# Patient Record
Sex: MALE | Race: WHITE | NOT HISPANIC OR LATINO | Employment: FULL TIME | URBAN - METROPOLITAN AREA
[De-identification: names, ages, dates, MRNs, and addresses within clinical notes are randomized per-mention and may not be internally consistent; named-entity substitution may affect disease eponyms.]

---

## 2017-03-29 ENCOUNTER — OFFICE VISIT (OUTPATIENT)
Dept: FAMILY MEDICINE | Facility: CLINIC | Age: 47
End: 2017-03-29
Payer: COMMERCIAL

## 2017-03-29 ENCOUNTER — RADIANT APPOINTMENT (OUTPATIENT)
Dept: ULTRASOUND IMAGING | Facility: CLINIC | Age: 47
End: 2017-03-29
Attending: PHYSICIAN ASSISTANT
Payer: COMMERCIAL

## 2017-03-29 ENCOUNTER — ANTICOAGULATION THERAPY VISIT (OUTPATIENT)
Dept: NURSING | Facility: CLINIC | Age: 47
End: 2017-03-29
Payer: COMMERCIAL

## 2017-03-29 VITALS
OXYGEN SATURATION: 98 % | HEIGHT: 73 IN | WEIGHT: 248 LBS | DIASTOLIC BLOOD PRESSURE: 83 MMHG | SYSTOLIC BLOOD PRESSURE: 131 MMHG | HEART RATE: 99 BPM | BODY MASS INDEX: 32.87 KG/M2

## 2017-03-29 DIAGNOSIS — Z79.01 LONG-TERM (CURRENT) USE OF ANTICOAGULANTS: ICD-10-CM

## 2017-03-29 DIAGNOSIS — M79.662 PAIN OF LEFT CALF: ICD-10-CM

## 2017-03-29 DIAGNOSIS — Z00.00 ROUTINE GENERAL MEDICAL EXAMINATION AT A HEALTH CARE FACILITY: Primary | ICD-10-CM

## 2017-03-29 DIAGNOSIS — K51.919 ULCERATIVE COLITIS WITH COMPLICATION, UNSPECIFIED LOCATION (H): ICD-10-CM

## 2017-03-29 DIAGNOSIS — I82.409 DEEP VEIN THROMBOSIS (DVT) (H): ICD-10-CM

## 2017-03-29 DIAGNOSIS — I82.402 DEEP VEIN THROMBOSIS (DVT) OF LEFT LOWER EXTREMITY, UNSPECIFIED CHRONICITY, UNSPECIFIED VEIN (H): ICD-10-CM

## 2017-03-29 LAB
ANION GAP SERPL CALCULATED.3IONS-SCNC: 9 MMOL/L (ref 3–14)
BASOPHILS # BLD AUTO: 0 10E9/L (ref 0–0.2)
BASOPHILS NFR BLD AUTO: 0.3 %
BUN SERPL-MCNC: 15 MG/DL (ref 7–30)
CALCIUM SERPL-MCNC: 9.4 MG/DL (ref 8.5–10.1)
CHLORIDE SERPL-SCNC: 103 MMOL/L (ref 94–109)
CHOLEST SERPL-MCNC: 167 MG/DL
CO2 SERPL-SCNC: 27 MMOL/L (ref 20–32)
CREAT SERPL-MCNC: 1.31 MG/DL (ref 0.66–1.25)
DIFFERENTIAL METHOD BLD: ABNORMAL
EOSINOPHIL # BLD AUTO: 0.3 10E9/L (ref 0–0.7)
EOSINOPHIL NFR BLD AUTO: 3.7 %
ERYTHROCYTE [DISTWIDTH] IN BLOOD BY AUTOMATED COUNT: 14.3 % (ref 10–15)
GFR SERPL CREATININE-BSD FRML MDRD: 59 ML/MIN/1.7M2
GLUCOSE SERPL-MCNC: 92 MG/DL (ref 70–99)
HCT VFR BLD AUTO: 45.5 % (ref 40–53)
HDLC SERPL-MCNC: 51 MG/DL
HGB BLD-MCNC: 15.3 G/DL (ref 13.3–17.7)
INR POINT OF CARE: 1.1 (ref 0.86–1.14)
LDLC SERPL CALC-MCNC: 100 MG/DL
LYMPHOCYTES # BLD AUTO: 1.9 10E9/L (ref 0.8–5.3)
LYMPHOCYTES NFR BLD AUTO: 24.8 %
MCH RBC QN AUTO: 30.7 PG (ref 26.5–33)
MCHC RBC AUTO-ENTMCNC: 33.6 G/DL (ref 31.5–36.5)
MCV RBC AUTO: 91 FL (ref 78–100)
MONOCYTES # BLD AUTO: 1.4 10E9/L (ref 0–1.3)
MONOCYTES NFR BLD AUTO: 18.2 %
NEUTROPHILS # BLD AUTO: 4 10E9/L (ref 1.6–8.3)
NEUTROPHILS NFR BLD AUTO: 53 %
NONHDLC SERPL-MCNC: 116 MG/DL
PLATELET # BLD AUTO: 314 10E9/L (ref 150–450)
POTASSIUM SERPL-SCNC: 4 MMOL/L (ref 3.4–5.3)
RADIOLOGIST FLAGS: ABNORMAL
RBC # BLD AUTO: 4.98 10E12/L (ref 4.4–5.9)
SODIUM SERPL-SCNC: 139 MMOL/L (ref 133–144)
TRIGL SERPL-MCNC: 82 MG/DL
WBC # BLD AUTO: 7.5 10E9/L (ref 4–11)

## 2017-03-29 PROCEDURE — 36416 COLLJ CAPILLARY BLOOD SPEC: CPT

## 2017-03-29 PROCEDURE — 80048 BASIC METABOLIC PNL TOTAL CA: CPT | Performed by: PHYSICIAN ASSISTANT

## 2017-03-29 PROCEDURE — 85025 COMPLETE CBC W/AUTO DIFF WBC: CPT | Performed by: PHYSICIAN ASSISTANT

## 2017-03-29 PROCEDURE — 99396 PREV VISIT EST AGE 40-64: CPT | Performed by: PHYSICIAN ASSISTANT

## 2017-03-29 PROCEDURE — 99207 ZZC NO CHARGE NURSE ONLY: CPT

## 2017-03-29 PROCEDURE — 80061 LIPID PANEL: CPT | Performed by: PHYSICIAN ASSISTANT

## 2017-03-29 PROCEDURE — 85610 PROTHROMBIN TIME: CPT | Mod: QW

## 2017-03-29 PROCEDURE — 99213 OFFICE O/P EST LOW 20 MIN: CPT | Mod: 25 | Performed by: PHYSICIAN ASSISTANT

## 2017-03-29 PROCEDURE — 93971 EXTREMITY STUDY: CPT | Mod: LT

## 2017-03-29 RX ORDER — PREDNISONE 20 MG/1
20 TABLET ORAL DAILY
Qty: 20 TABLET | Refills: 0 | Status: SHIPPED | OUTPATIENT
Start: 2017-03-29 | End: 2017-05-03

## 2017-03-29 RX ORDER — MESALAMINE 4 G/60ML
4 SUSPENSION RECTAL AT BEDTIME
Qty: 7 ENEMA | Refills: 3 | Status: SHIPPED | OUTPATIENT
Start: 2017-03-29 | End: 2017-05-03

## 2017-03-29 RX ORDER — WARFARIN SODIUM 5 MG/1
5 TABLET ORAL DAILY
COMMUNITY
End: 2017-03-29

## 2017-03-29 RX ORDER — WARFARIN SODIUM 5 MG/1
TABLET ORAL
Qty: 45 TABLET | Refills: 1 | Status: SHIPPED | OUTPATIENT
Start: 2017-03-29 | End: 2017-04-26

## 2017-03-29 NOTE — MR AVS SNAPSHOT
Donato De Leon   3/29/2017 2:00 PM   Anticoagulation Therapy Visit    Description:  47 year old male   Provider:  AN ANTI COAG   Department:  An Nurse           INR as of 3/29/2017     Today's INR 1.1!      Anticoagulation Summary as of 3/29/2017     INR goal 2.0-3.0   Today's INR 1.1!   Full instructions 3/29: 7.5 mg; 3/30: 7.5 mg   Next INR check 3/31/2017    Indications   Deep vein thrombosis (DVT) (H) [I82.409] [I82.409]  Long-term (current) use of anticoagulants [Z79.01] [Z79.01]         Your next Anticoagulation Clinic appointment(s)     Mar 31, 2017  8:30 AM CDT   [facility]   [facility]    [address] [facility] [address] [phone]            Apr 03, 2017  8:15 AM CDT   Anticoagulation Visit with AN ANTI COAG   [facility]    [address] [facility] [address] [phone]              Contact Numbers     [facility]  Please call  [phone] to cancel and/or reschedule your appointment, or with any problems or questions regarding your therapy.        March 2017 Details    Sun Mon Tue Wed Thu Fri Sat        1               2               3               4                 5               6               7               8               9               10               11                 12               13               14               15               16               17               18                 19               20               21               22               23               24               25                 26               27               28               29      7.5 mg   See details      30      7.5 mg         31              Date Details   03/29 This INR check       Date of next INR:  3/31/2017         How to take your warfarin dose     To take:  7.5 mg Take 1.5 of the 5 mg tablets.

## 2017-03-29 NOTE — PROGRESS NOTES
ANTICOAGULATION INITIAL CLINIC VISIT    Patient Name:  Dnoato De Leon  Date:  3/29/2017  Referred by: Carter GOAYL  Contact Type:  Face to Face    SUBJECTIVE:  Coumadin education was completed today.  Topics covered include:  -Introduction to coumadin  -Proper Administration  -INR Testing  -Sign/Symptoms of Bleeding  -Signs/Symptoms of Clot Formation or Stroke  -Dietary Intake of Vitamin K  -Drug Interactions  -Anticoagulation Identification (bracelet, necklace or wallet card)  -Future Surgery  -Effects of Alcohol, Tobacco, and Exercise on Coumadin    Coumadin Education Booklet given to the patient.  Written instructions on self injecting sub q lovenox also given to patient.        Patient Findings     Positives Initiation of therapy    Comments New Dx DVT lower leg today. Starting on Lovenox and warfarin. Start dose 7.5 mg daily and lovenox 100 mg Q 12 hours. Reviewed all warfarin teaching as per protocol and instructions on self administering lovenox sub q. Patient verbalized understanding and seems to have a good understanding. Recheck INR in 2 days. Patient also has ulcerative colitis flare. Was going to start prednisone for this but due to interaction with warfarin will be using an alternative enema treatment. Patient given phone # to call if any concerns or questions.           OBJECTIVE    INR Protime   Date Value Ref Range Status   03/29/2017 1.1 0.86 - 1.14 Final       ASSESSMENT / PLAN  INR assessment SUB    Recheck INR In: 2 DAYS    INR Location Clinic      Anticoagulation Summary as of 3/29/2017     INR goal 2.0-3.0   Today's INR 1.1!   Maintenance plan No maintenance plan   Full instructions 3/29: 7.5 mg; 3/30: 7.5 mg   Plan last modified Lyudmila Good RN (3/29/2017)   Next INR check 3/31/2017   Target end date     Indications   Deep vein thrombosis (DVT) (H) [I82.409] [I82.409]  Long-term (current) use of anticoagulants [Z79.01] [Z79.01]         Anticoagulation Episode Summary     INR check  location     Preferred lab     Send INR reminders to AN ANTICOAG CLINIC    Comments       Anticoagulation Care Providers     Provider Role Specialty Phone number    Brody, Carter Romo PA-C [facility] [place] [phone]            See the Encounter Report to view Anticoagulation Flowsheet and Dosing Calendar (Go to Encounters tab in chart review, and find the Anticoagulation Therapy Visit)    Dosage adjustment made based on physician directed care plan.    Lyudmila Good RN

## 2017-03-29 NOTE — NURSING NOTE
"Chief Complaint   Patient presents with     Physical       Initial /83  Pulse 99  Ht 6' 1\" (1.854 m)  Wt 248 lb (112.5 kg)  SpO2 98%  BMI 32.72 kg/m2 Estimated body mass index is 32.72 kg/(m^2) as calculated from the following:    Height as of this encounter: 6' 1\" (1.854 m).    Weight as of this encounter: 248 lb (112.5 kg).  Medication Reconciliation: complete  Mindi Stanley CMA    "

## 2017-03-29 NOTE — MR AVS SNAPSHOT
After Visit Summary   3/29/2017    Donato De Leon    MRN: [id number]           Patient Information     Date Of Birth          1970        Visit Information        Provider Department      3/29/2017 10:40 AM Carter Skinner PA-C [facility]        Today's Diagnoses     Routine general medical examination at a health care facility    -  1    BMI 32.0-32.9,adult        Ulcerative colitis with complication, unspecified location (H)        Pain of left calf          Care Instructions      Preventive Health Recommendations  Male Ages 40 to 49    Yearly exam:             See your health care provider every year in order to  o   Review health changes.   o   Discuss preventive care.    o   Review your medicines if your doctor has prescribed any.    You should be tested each year for STDs (sexually transmitted diseases) if you re at risk.     Have a cholesterol test every 5 years.     Have a colonoscopy (test for colon cancer) if someone in your family has had colon cancer or polyps before age 50.     After age 45, have a diabetes test (fasting glucose). If you are at risk for diabetes, you should have this test every 3 years.      Talk with your health care provider about whether or not a prostate cancer screening test (PSA) is right for you.    Shots: Get a flu shot each year. Get a tetanus shot every 10 years.     Nutrition:    Eat at least 5 servings of fruits and vegetables daily.     Eat whole-grain bread, whole-wheat pasta and brown rice instead of white grains and rice.     Talk to your provider about Calcium and Vitamin D.     Lifestyle    Exercise for at least 150 minutes a week (30 minutes a day, 5 days a week). This will help you control your weight and prevent disease.     Limit alcohol to one drink per day.     No smoking.     Wear sunscreen to prevent skin cancer.     See your dentist every six months for an exam and cleaning.            Follow-ups after your visit         Your next 10 appointments already scheduled     Mar 29, 2017  1:30 PM CDT   US LOWER EXTREMITY VENOUS DUPLEX LEFT with FKUS1   [facility] ([facility])    [address] [place] [address]  [place] [address] [phone]           Please bring a list of your medicines (including vitamins, minerals and over-the-counter drugs). Also, tell your doctor about any allergies you may have. Wear comfortable clothes and leave your valuables at home.  You do not need to do anything special to prepare for your exam.  Please call the Imaging Department at your exam site with any questions.              Future tests that were ordered for you today     Open Future Orders        Priority Expected Expires Ordered    US Lower Extremity Venous Duplex Left Routine  3/29/2018 3/29/2017            Who to contact     If you have questions or need follow up information about today's clinic visit or your schedule please contact [facility] directly at [phone].  Normal or non-critical lab and imaging results will be communicated to you by [employer], letter or phone within 4 business days after the clinic has received the results. If you do not hear from us within 7 days, please contact the clinic through [employer] or phone. If you have a critical or abnormal lab result, we will notify you by phone as soon as possible.  Submit refill requests through [employer] or call your pharmacy and they will forward the refill request to us. Please allow 3 business days for your refill to be completed.          Additional Information About Your Visit        [employer] Information     [employer] gives you secure access to your electronic health record. If you see a primary care provider, you can also send messages to your care team and make appointments. If you have questions, please call your primary care clinic.  If you do not have a primary care provider, please call [phone] and they will assist you.        Care  "EveryWhere ID     This is your Care EveryWhere ID. This could be used by other organizations to access your [place] medical records  [id number]        Your Vitals Were     Pulse Height Pulse Oximetry BMI (Body Mass Index)          99 6' 1\" (1.854 m) 98% 32.72 kg/m2         Blood Pressure from Last 3 Encounters:   03/29/17 131/83   12/20/16 131/84   04/19/16 124/85    Weight from Last 3 Encounters:   03/29/17 248 lb (112.5 kg)   04/19/16 253 lb (114.8 kg)   03/20/16 256 lb (116.1 kg)              We Performed the Following     Basic metabolic panel     CBC with platelets differential     Lipid panel reflex to direct LDL          Today's Medication Changes          These changes are accurate as of: 3/29/17 11:12 AM.  If you have any questions, ask your nurse or doctor.               Start taking these medicines.        Dose/Directions    predniSONE 20 MG tablet   Commonly known as:  DELTASONE   Used for:  Ulcerative colitis with complication, unspecified location (H)   Started by:  Carter Skinner PA-C        Dose:  20 mg   Take 1 tablet (20 mg) by mouth daily   Quantity:  20 tablet   Refills:  0            Where to get your medicines      These medications were sent to [facility] [address] [address]     Phone:  [phone]     predniSONE 20 MG tablet                Primary Care Provider Office Phone # Fax #    Carter Skinner PA-C [phone]       [address] [address]        Thank you!     Thank you for choosing [facility]  for your care. Our goal is always to provide you with excellent care. Hearing back from our patients is one way we can continue to improve our services. Please take a few minutes to complete the written survey that you may receive in the mail after your visit with us. Thank you!             Your Updated Medication List - " Protect others around you: Learn how to safely use, store and throw away your medicines at www.disposemymeds.org.          This list is accurate as of: 3/29/17 11:12 AM.  Always use your most recent med list.                   Brand Name Dispense Instructions for use    NO ACTIVE MEDICATIONS      .       predniSONE 20 MG tablet    DELTASONE    20 tablet    Take 1 tablet (20 mg) by mouth daily

## 2017-03-29 NOTE — PROGRESS NOTES
SUBJECTIVE:     CC: Donato De Leon is an 47 year old male who presents for preventative health visit.     Healthy Habits:    Do you get at least three servings of calcium containing foods daily (dairy, green leafy vegetables, etc.)? yes    Amount of exercise or daily activities, outside of work: about 5 days a week    Problems taking medications regularly not applicable    Medication side effects: No    Have you had an eye exam in the past two years? yes    Do you see a dentist twice per year? yes  Do you have sleep apnea, excessive snoring or daytime drowsiness?no    No concerns. Needs biometric from completed  Wanted to let me know that he takes ibu daily for joint pains.     occ left calf pain. No injury for 3 wks. Occ swells. No trauma no bleed clot history.     History of U.C and 1-2 flare ups yearly. Been couple yrs since seeing GI.  Does get yearly colonosocpy. And stable.   6 wks for bloody liquid stools over 12 a day.     Today's PHQ-2 Score:   PHQ-2 ( 1999 Pfizer) 4/19/2016 3/19/2015   Q1: Little interest or pleasure in doing things 0 0   Q2: Feeling down, depressed or hopeless 0 0   PHQ-2 Score 0 0       Abuse: Current or Past(Physical, Sexual or Emotional)- No  Do you feel safe in your environment - Yes    Social History   Substance Use Topics     Smoking status: Never Smoker     Smokeless tobacco: Never Used     Alcohol use Yes      Comment: rare     The patient does not drink >3 drinks per day nor >7 drinks per week.    Last PSA: No results found for: PSA    Recent Labs   Lab Test  04/19/16   0858  03/19/15   1754  11/05/13   0849   CHOL  155  198  172   HDL  51  57  45   LDL  94  132*  116   TRIG  51  47  53   CHOLHDLRATIO   --   3.5  3.8   NHDL  104   --    --        Reviewed orders with patient. Reviewed health maintenance and updated orders accordingly - Yes    Reviewed and updated as needed this visit by clinical staff  Tobacco  Allergies  Meds  Problems  Med Hx  Surg Hx  Fam Hx  Soc Hx           Reviewed and updated as needed this visit by Provider  Allergies  Meds  Problems          Past Medical History:   Diagnosis Date     Colitis, ulcerative (H)      Renal lithiasis       Past Surgical History:   Procedure Laterality Date     HC FRAGMENTING OF KIDNEY STONE         ROS:  C: NEGATIVE for fever, chills, change in weight  I: NEGATIVE for worrisome rashes, moles or lesions  E: NEGATIVE for vision changes or irritation  ENT: NEGATIVE for ear, mouth and throat problems  R: NEGATIVE for significant cough or SOB  CV: NEGATIVE for chest pain, palpitations or peripheral edema  GI: NEGATIVE for nausea, abdominal pain, heartburn, or change in bowel habits   male: negative for dysuria, hematuria, decreased urinary stream, erectile dysfunction, urethral discharge  M: NEGATIVE for significant arthralgias or myalgia  N: NEGATIVE for weakness, dizziness or paresthesias  P: NEGATIVE for changes in mood or affect    Problem list, Medication list, Allergies, and Medical/Social/Surgical histories reviewed in EPIC and updated as appropriate.  Labs reviewed in EPIC  BP Readings from Last 3 Encounters:   03/29/17 131/83   12/20/16 131/84   04/19/16 124/85    Wt Readings from Last 3 Encounters:   03/29/17 248 lb (112.5 kg)   04/19/16 253 lb (114.8 kg)   03/20/16 256 lb (116.1 kg)                  Patient Active Problem List   Diagnosis     CARDIOVASCULAR SCREENING; LDL GOAL LESS THAN 160     Cellulitis of arm, right     Plantar fasciitis     Cough     Renal lithiasis     Gouty arthritis of toe of right foot     BMI 32.0-32.9,adult     Ulcerative colitis with complication, unspecified location (H)     Past Surgical History:   Procedure Laterality Date     HC FRAGMENTING OF KIDNEY STONE         Social History   Substance Use Topics     Smoking status: Never Smoker     Smokeless tobacco: Never Used     Alcohol use Yes      Comment: rare     History reviewed. No pertinent family history.      Current Outpatient  "Prescriptions   Medication Sig Dispense Refill     predniSONE (DELTASONE) 20 MG tablet Take 1 tablet (20 mg) by mouth daily 20 tablet 0     mesalamine (ROWASA) 4 G enema Place 1 enema (4 g) rectally At Bedtime 7 enema 3     enoxaparin (LOVENOX) 100 MG/ML injection Inject 1 mL (100 mg) Subcutaneous 2 times daily 10 Syringe 1     NO ACTIVE MEDICATIONS .       Allergies   Allergen Reactions     Nkda [No Known Drug Allergies]      OBJECTIVE:     /83  Pulse 99  Ht 6' 1\" (1.854 m)  Wt 248 lb (112.5 kg)  SpO2 98%  BMI 32.72 kg/m2  EXAM:  GENERAL: healthy, alert and no distress- looks pale.  EYES: Eyes grossly normal to inspection, PERRL and conjunctivae and sclerae normal  HENT: ear canals and TM's normal, nose and mouth without ulcers or lesions  NECK: no adenopathy, no asymmetry, masses, or scars and thyroid normal to palpation  RESP: lungs clear to auscultation - no rales, rhonchi or wheezes  CV: regular rate and rhythm, normal S1 S2, no S3 or S4, no murmur, click or rub, no peripheral edema and peripheral pulses strong  ABDOMEN: soft, nontender, no hepatosplenomegaly, no masses and bowel sounds normal   (male): normal male genitalia without lesions or urethral discharge, no hernia  MS: no gross musculoskeletal defects noted, no edema  SKIN: no suspicious lesions or rashes  NEURO: Normal strength and tone, mentation intact and speech normal  PSYCH: mentation appears normal, affect normal/bright  Mild left calf tenderness and edema. Neg homans sign. Neurovascularly Intact Distally.   full range of motion ankle.    ASSESSMENT/PLAN:         ICD-10-CM    1. Routine general medical examination at a health care facility Z00.00 Lipid panel reflex to direct LDL     Basic metabolic panel     CBC with platelets differential   2. Ulcerative colitis with complication, unspecified location (H) K51.919 predniSONE (DELTASONE) 20 MG tablet     OFFICE/OUTPT VISIT,EST,LEVL III     mesalamine (ROWASA) 4 G enema   3. Pain of " "left calf M79.662 US Lower Extremity Venous Duplex Left     OFFICE/OUTPT VISIT,EST,LEVL III   4. Deep vein thrombosis (DVT) of left lower extremity, unspecified chronicity, unspecified vein (H) I82.402 INR CLINIC REFERRAL     enoxaparin (LOVENOX) 100 MG/ML injection   5. BMI 32.0-32.9,adult Z68.32      Will get U.S today of left calf to R/O DVT. Spoke to radiologist and positive for DVT. He was called and told to come back to clinic to see INR RN.   Ok to start prednisone for UC flare up and is to follow up  With GI.  Labs pending.   Follow up  Dependant on labs.     COUNSELING:  Reviewed preventive health counseling, as reflected in patient instructions       Regular exercise       Healthy diet/nutrition    BP Screening:   Last 3 BP Readings:    BP Readings from Last 3 Encounters:   03/29/17 131/83   12/20/16 131/84   04/19/16 124/85       The following was recommended to the patient:  Re-screen BP within a year and recommended lifestyle modifications     reports that he has never smoked. He has never used smokeless tobacco.    Estimated body mass index is 32.72 kg/(m^2) as calculated from the following:    Height as of this encounter: 6' 1\" (1.854 m).    Weight as of this encounter: 248 lb (112.5 kg).   Weight management plan: Discussed healthy diet and exercise guidelines and patient will follow up in 12 months in clinic to re-evaluate.    Counseling Resources:  ATP IV Guidelines  Pooled Cohorts Equation Calculator  FRAX Risk Assessment  ICSI Preventive Guidelines  Dietary Guidelines for Americans, 2010  USDA's MyPlate  ASA Prophylaxis  Lung CA Screening    Carter Skinner PA-C  [facility]    "

## 2017-03-30 NOTE — PROGRESS NOTES
Mr. De Leon,    All of your labs were normal for you. Kidney functions are slightly abnormal. Probably due to slight dehydration.       Please contact the clinic if you have additional questions.  Thank you.    Sincerely,    Carter Skinner PA-C

## 2017-03-31 ENCOUNTER — ANTICOAGULATION THERAPY VISIT (OUTPATIENT)
Dept: NURSING | Facility: CLINIC | Age: 47
End: 2017-03-31
Payer: COMMERCIAL

## 2017-03-31 DIAGNOSIS — Z79.01 LONG-TERM (CURRENT) USE OF ANTICOAGULANTS: ICD-10-CM

## 2017-03-31 DIAGNOSIS — I82.409 DEEP VEIN THROMBOSIS (DVT) (H): ICD-10-CM

## 2017-03-31 LAB — INR POINT OF CARE: 1.1 (ref 0.86–1.14)

## 2017-03-31 PROCEDURE — 36416 COLLJ CAPILLARY BLOOD SPEC: CPT

## 2017-03-31 PROCEDURE — 85610 PROTHROMBIN TIME: CPT | Mod: QW

## 2017-03-31 PROCEDURE — 99207 ZZC NO CHARGE NURSE ONLY: CPT

## 2017-03-31 NOTE — MR AVS SNAPSHOT
Donato De Leon   3/31/2017 8:30 AM   Anticoagulation Therapy Visit    Description:  47 year old male   Provider:  AN ANTI COAG   Department:  An Nurse           INR as of 3/31/2017     Today's INR 1.1!      Anticoagulation Summary as of 3/31/2017     INR goal 2.0-3.0   Today's INR 1.1!   Full instructions 3/31: 10 mg; 4/1: 7.5 mg; 4/2: 7.5 mg   Next INR check 4/3/2017    Indications   Deep vein thrombosis (DVT) (H) [I82.409] [I82.409]  Long-term (current) use of anticoagulants [Z79.01] [Z79.01]         Your next Anticoagulation Clinic appointment(s)     Apr 03, 2017  8:15 AM CDT   [facility] AN ANTI COAG   [facility]    [address] [facility] [address] [phone]            Apr 06, 2017  8:30 AM CDT   Anticoagulation Visit with AN ANTI COAG   [facility]    [address] [facility] [address] [id number] [phone]              Contact Numbers     [facility]  Please call  [phone] to cancel and/or reschedule your appointment, or with any problems or questions regarding your therapy.        March 2017 Details    Sun Mon Tue Wed Thu Fri Sat        1               2               3               4                 5               6               7               8               9               10               11                 12               13               14               15               16               17               18                 19               20               21               22               23               24               25                 26               27               28               29               30               31      10 mg   See details        Date Details   03/31 This INR check               How to take your warfarin dose     To take:  10 mg Take 2 of the 5 mg tablets.           April 2017 Details    Sun Mon Tue Wed Thu Fri Sat           1      7.5 mg            2      7.5 mg         3            4               5               6               7               8                 9               10               11               12               13               14               15                 16               17               18               19               20               21               22                 23               24               25               26               27               28               29                 30                      Date Details   No additional details    Date of next INR:  4/3/2017         How to take your warfarin dose     To take:  7.5 mg Take 1.5 of the 5 mg tablets.

## 2017-03-31 NOTE — PROGRESS NOTES
ANTICOAGULATION FOLLOW-UP CLINIC VISIT    Patient Name:  Donato De Leon  Date:  3/31/2017  Contact Type:  Face to Face    SUBJECTIVE:     Patient Findings     Positives No Problem Findings           OBJECTIVE    INR Protime   Date Value Ref Range Status   03/31/2017 1.1 0.86 - 1.14 Final       ASSESSMENT / PLAN  INR assessment SUB    Recheck INR In: 3 DAYS    INR Location Clinic      Anticoagulation Summary as of 3/31/2017     INR goal 2.0-3.0   Today's INR 1.1!   Maintenance plan No maintenance plan   Full instructions 3/31: 10 mg; 4/1: 7.5 mg; 4/2: 7.5 mg   Plan last modified Lyudmila Good RN (3/29/2017)   Next INR check 4/3/2017   Target end date     Indications   Deep vein thrombosis (DVT) (H) [I82.409] [I82.409]  Long-term (current) use of anticoagulants [Z79.01] [Z79.01]         Anticoagulation Episode Summary     INR check location     Preferred lab     Send INR reminders to AN ANTICOAG CLINIC    Comments       Anticoagulation Care Providers     Provider Role Specialty Phone number    Carter Skinner PA-C [facility] [phone]            See the Encounter Report to view Anticoagulation Flowsheet and Dosing Calendar (Go to Encounters tab in chart review, and find the Anticoagulation Therapy Visit)        Chanelle Fu RN

## 2017-04-03 ENCOUNTER — ANTICOAGULATION THERAPY VISIT (OUTPATIENT)
Dept: NURSING | Facility: CLINIC | Age: 47
End: 2017-04-03
Payer: COMMERCIAL

## 2017-04-03 DIAGNOSIS — I82.409 DEEP VEIN THROMBOSIS (DVT) (H): ICD-10-CM

## 2017-04-03 DIAGNOSIS — Z79.01 LONG-TERM (CURRENT) USE OF ANTICOAGULANTS: ICD-10-CM

## 2017-04-03 LAB — INR POINT OF CARE: 1.5 (ref 0.86–1.14)

## 2017-04-03 PROCEDURE — 99207 ZZC NO CHARGE NURSE ONLY: CPT

## 2017-04-03 PROCEDURE — 85610 PROTHROMBIN TIME: CPT | Mod: QW

## 2017-04-03 PROCEDURE — 36416 COLLJ CAPILLARY BLOOD SPEC: CPT

## 2017-04-03 NOTE — PROGRESS NOTES
ANTICOAGULATION FOLLOW-UP CLINIC VISIT    Patient Name:  Donato De Leon  Date:  4/3/2017  Contact Type:  Face to Face    SUBJECTIVE:     Patient Findings     Positives Initiation of therapy, Missed doses    Comments Missed dose on Friday. Took 10 mg sa , barnes and this am. Continue on lovenox. Recheck 3 days. No new concerns or questions.           OBJECTIVE    INR Protime   Date Value Ref Range Status   04/03/2017 1.5 (A) 0.86 - 1.14 Final       ASSESSMENT / PLAN  INR assessment SUB    Recheck INR In: 3 DAYS    INR Location Clinic      Anticoagulation Summary as of 4/3/2017     INR goal 2.0-3.0   Today's INR 1.5!   Maintenance plan No maintenance plan   Full instructions 4/3: 10 mg; 4/4: 10 mg; 4/5: 10 mg   Plan last modified Lyudmila Good RN (3/29/2017)   Next INR check 4/6/2017   Target end date     Indications   Deep vein thrombosis (DVT) (H) [I82.409] [I82.409]  Long-term (current) use of anticoagulants [Z79.01] [Z79.01]         Anticoagulation Episode Summary     INR check location     Preferred lab     Send INR reminders to AN ANTICOAG CLINIC    Comments       Anticoagulation Care Providers     Provider Role Specialty Phone number    Brody, Carter Romo PA-C [facility] [phone]            See the Encounter Report to view Anticoagulation Flowsheet and Dosing Calendar (Go to Encounters tab in chart review, and find the Anticoagulation Therapy Visit)        Lyudmila Good RN

## 2017-04-03 NOTE — MR AVS SNAPSHOT
Donato De Leon   4/3/2017 8:15 AM   Anticoagulation Therapy Visit    Description:  47 year old male   Provider:  AN ANTI COAG   Department:  An Nurse           INR as of 4/3/2017     Today's INR 1.5!      Anticoagulation Summary as of 4/3/2017     INR goal 2.0-3.0   Today's INR 1.5!   Full instructions 4/3: 10 mg; 4/4: 10 mg; 4/5: 10 mg   Next INR check 4/6/2017    Indications   Deep vein thrombosis (DVT) (H) [I82.409] [I82.409]  Long-term (current) use of anticoagulants [Z79.01] [Z79.01]         Your next Anticoagulation Clinic appointment(s)     Apr 06, 2017  8:30 AM CDT   Anticoagulation Visit with AN ANTI COAG   [facility] ([facility])    [address] [address] [phone]              Contact Numbers     [facility]  Please call  [phone] to cancel and/or reschedule your appointment, or with any problems or questions regarding your therapy.        April 2017 Details    Sun Mon Tue Wed Thu Fri Sat           1                 2               3      10 mg   See details      4      10 mg         5      10 mg         6            7               8                 9               10               11               12               13               14               15                 16               17               18               19               20               21               22                 23               24               25               26               27               28               29                 30                      Date Details   04/03 This INR check       Date of next INR:  4/6/2017         How to take your warfarin dose     To take:  10 mg Take 2 of the 5 mg tablets.

## 2017-04-06 ENCOUNTER — ANTICOAGULATION THERAPY VISIT (OUTPATIENT)
Dept: NURSING | Facility: CLINIC | Age: 47
End: 2017-04-06
Payer: COMMERCIAL

## 2017-04-06 DIAGNOSIS — Z79.01 LONG-TERM (CURRENT) USE OF ANTICOAGULANTS: ICD-10-CM

## 2017-04-06 DIAGNOSIS — I82.409 DEEP VEIN THROMBOSIS (DVT) (H): ICD-10-CM

## 2017-04-06 LAB — INR POINT OF CARE: 2.7 (ref 0.86–1.14)

## 2017-04-06 PROCEDURE — 99207 ZZC NO CHARGE NURSE ONLY: CPT

## 2017-04-06 PROCEDURE — 36416 COLLJ CAPILLARY BLOOD SPEC: CPT

## 2017-04-06 PROCEDURE — 85610 PROTHROMBIN TIME: CPT | Mod: QW

## 2017-04-06 NOTE — MR AVS SNAPSHOT
Donato De Leon   4/6/2017 8:30 AM   Anticoagulation Therapy Visit    Description:  47 year old male   Provider:  AN ANTI COAG   Department:  An Nurse           INR as of 4/6/2017     Today's INR 2.7      Anticoagulation Summary as of 4/6/2017     INR goal 2.0-3.0   Today's INR 2.7   Full instructions 4/6: 7.5 mg; 4/7: 7.5 mg; 4/8: 7.5 mg; 4/9: 10 mg   Next INR check 4/10/2017    Indications   Deep vein thrombosis (DVT) (H) [I82.409] [I82.409]  Long-term (current) use of anticoagulants [Z79.01] [Z79.01]         Your next Anticoagulation Clinic appointment(s)     Apr 10, 2017 10:45 AM CDT   Anticoagulation Visit with AN ANTI COAG   [facility]    [address] [facility] [address] [phone]            Apr 19, 2017  8:45 AM CDT   Anticoagulation Visit with AN ANTI COAG   [facility]    [address] [facility] [id number] [phone]              Contact Numbers     [facility]  Please call  [phone] to cancel and/or reschedule your appointment, or with any problems or questions regarding your therapy.        April 2017 Details    Sun Mon Tue Wed Thu Fri Sat           1                 2               3               4               5               6      7.5 mg   See details      7      7.5 mg         8      7.5 mg           9      10 mg         10            11               12               13               14               15                 16               17               18               19               20               21               22                 23               24               25               26               27               28               29                 30                      Date Details   04/06 This INR check       Date of next INR:  4/10/2017         How to take your warfarin dose     To take:  7.5 mg Take 1.5 of the 5 mg tablets.    To take:  10 mg Take 2 of the 5 mg  tablets.

## 2017-04-06 NOTE — PROGRESS NOTES
ANTICOAGULATION FOLLOW-UP CLINIC VISIT    Patient Name:  Donato De Leon  Date:  4/6/2017  Contact Type:  Face to Face    SUBJECTIVE:     Patient Findings     Positives Initiation of therapy    Comments Therapeutic. INR increase from 1.5 to 2.7. Is out of lovenox. Stop lovenox. Dose adjusted and recheck in 4 days.           OBJECTIVE    INR Protime   Date Value Ref Range Status   04/06/2017 2.7 (A) 0.86 - 1.14 Final       ASSESSMENT / PLAN  INR assessment THER    Recheck INR In: 4 DAYS    INR Location Clinic      Anticoagulation Summary as of 4/6/2017     INR goal 2.0-3.0   Today's INR 2.7   Maintenance plan No maintenance plan   Full instructions 4/6: 7.5 mg; 4/7: 7.5 mg; 4/8: 7.5 mg; 4/9: 10 mg   Plan last modified Lyudmila Good RN (3/29/2017)   Next INR check 4/10/2017   Target end date     Indications   Deep vein thrombosis (DVT) (H) [I82.409] [I82.409]  Long-term (current) use of anticoagulants [Z79.01] [Z79.01]         Anticoagulation Episode Summary     INR check location     Preferred lab     Send INR reminders to AN ANTICOAG CLINIC    Comments       Anticoagulation Care Providers     Provider Role Specialty Phone number    Carter Skinner PA-C [facility] [phone]            See the Encounter Report to view Anticoagulation Flowsheet and Dosing Calendar (Go to Encounters tab in chart review, and find the Anticoagulation Therapy Visit)    Dosage adjustment made based on physician directed care plan.    Lyudmila Good RN

## 2017-04-10 ENCOUNTER — ANTICOAGULATION THERAPY VISIT (OUTPATIENT)
Dept: NURSING | Facility: CLINIC | Age: 47
End: 2017-04-10
Payer: COMMERCIAL

## 2017-04-10 DIAGNOSIS — I82.409 DEEP VEIN THROMBOSIS (DVT) (H): ICD-10-CM

## 2017-04-10 DIAGNOSIS — Z79.01 LONG-TERM (CURRENT) USE OF ANTICOAGULANTS: ICD-10-CM

## 2017-04-10 LAB — INR POINT OF CARE: 4.7 (ref 0.86–1.14)

## 2017-04-10 PROCEDURE — 36416 COLLJ CAPILLARY BLOOD SPEC: CPT

## 2017-04-10 PROCEDURE — 99207 ZZC NO CHARGE NURSE ONLY: CPT

## 2017-04-10 PROCEDURE — 85610 PROTHROMBIN TIME: CPT | Mod: QW

## 2017-04-10 NOTE — PROGRESS NOTES
ANTICOAGULATION FOLLOW-UP CLINIC VISIT    Patient Name:  Donato De Leon  Date:  4/10/2017  Contact Type:  Face to Face    SUBJECTIVE:     Patient Findings     Positives Initiation of therapy    Comments INR 4.7. Leaving out of town tomorrow for a week. Took dose today of 7.5 mg . Will adjust dose and recheck when back home. Cautioned on increase risk of bruising.            OBJECTIVE    INR Protime   Date Value Ref Range Status   04/10/2017 4.7 (A) 0.86 - 1.14 Final       ASSESSMENT / PLAN  INR assessment SUPRA    Recheck INR In: 8 DAYS    INR Location Clinic      Anticoagulation Summary as of 4/10/2017     INR goal 2.0-3.0   Today's INR 4.7!   Maintenance plan No maintenance plan   Full instructions 4/10: Hold; 4/11: 5 mg; 4/12: 5 mg; 4/13: 7.5 mg; 4/14: 7.5 mg; 4/15: 7.5 mg; 4/16: 7.5 mg; 4/17: 7.5 mg; 4/18: 7.5 mg   Plan last modified Lyudmila Good RN (4/10/2017)   Next INR check 4/19/2017   Target end date     Indications   Deep vein thrombosis (DVT) (H) [I82.409] [I82.409]  Long-term (current) use of anticoagulants [Z79.01] [Z79.01]         Anticoagulation Episode Summary     INR check location     Preferred lab     Send INR reminders to AN ANTICOAG CLINIC    Comments       Anticoagulation Care Providers     Provider Role Specialty Phone number    Carter Skinner PA-C [facility] [phone]            See the Encounter Report to view Anticoagulation Flowsheet and Dosing Calendar (Go to Encounters tab in chart review, and find the Anticoagulation Therapy Visit)        Lyudmila Good RN

## 2017-04-10 NOTE — MR AVS SNAPSHOT
Donato De Leon   4/10/2017 10:45 AM   Anticoagulation Therapy Visit    Description:  47 year old male   Provider:  AN ANTI COAG   Department:  An Nurse           INR as of 4/10/2017     Today's INR 4.7!      Anticoagulation Summary as of 4/10/2017     INR goal 2.0-3.0   Today's INR 4.7!   Full instructions 4/10: Hold; 4/11: 5 mg; 4/12: 5 mg; 4/13: 7.5 mg; 4/14: 7.5 mg; 4/15: 7.5 mg; 4/16: 7.5 mg; 4/17: 7.5 mg; 4/18: 7.5 mg   Next INR check 4/19/2017    Indications   Deep vein thrombosis (DVT) (H) [I82.409] [I82.409]  Long-term (current) use of anticoagulants [Z79.01] [Z79.01]         Your next Anticoagulation Clinic appointment(s)     Apr 10, 2017 10:45 AM CDT   [facility]agulation [facility] with AN ANTI COAG   [facility]    [address] [address] [phone]            Apr 19, 2017  8:45 AM CDT   Anticoagulation Visit with AN ANTI COAG   [facility]    [address] [address] [phone]              Contact Numbers     [facility]  Please call  [phone] to cancel and/or reschedule your appointment, or with any problems or questions regarding your therapy.        April 2017 Details    Sun Mon Tue Wed Thu Fri Sat           1                 2               3               4               5               6               7               8                 9               10      Hold   See details      11      5 mg         12      5 mg         13      7.5 mg         14      7.5 mg         15      7.5 mg           16      7.5 mg         17      7.5 mg         18      7.5 mg         19            20               21               22                 23               24               25               26               27               28               29                 30                      Date Details   04/10 This INR check       Date of next INR:  4/19/2017         How to take your  warfarin dose     To take:  5 mg Take 1 of the 5 mg tablets.    To take:  7.5 mg Take 1.5 of the 5 mg tablets.    Hold Do not take your warfarin dose. See the Details table to the right for additional instructions.

## 2017-04-19 ENCOUNTER — ANTICOAGULATION THERAPY VISIT (OUTPATIENT)
Dept: NURSING | Facility: CLINIC | Age: 47
End: 2017-04-19
Payer: COMMERCIAL

## 2017-04-19 DIAGNOSIS — Z79.01 LONG-TERM (CURRENT) USE OF ANTICOAGULANTS: ICD-10-CM

## 2017-04-19 DIAGNOSIS — I82.409 DEEP VEIN THROMBOSIS (DVT) (H): ICD-10-CM

## 2017-04-19 LAB — INR POINT OF CARE: 1.1 (ref 0.86–1.14)

## 2017-04-19 PROCEDURE — 36416 COLLJ CAPILLARY BLOOD SPEC: CPT

## 2017-04-19 PROCEDURE — 85610 PROTHROMBIN TIME: CPT | Mod: QW

## 2017-04-19 PROCEDURE — 99207 ZZC NO CHARGE NURSE ONLY: CPT

## 2017-04-19 NOTE — PROGRESS NOTES
ANTICOAGULATION FOLLOW-UP CLINIC VISIT    Patient Name:  Donato De Leon  Date:  4/19/2017  Contact Type:  Face to Face    SUBJECTIVE:     Patient Findings     Positives Initiation of therapy, Med error    Comments INR 1.1. Was out of town for past week and was supra at 4.7 at last check. Dose was adjusted. Patient in error took 5 mg daily instead of 7.5 mg as directed. Dose adjusted and recheck in 1 wk.            OBJECTIVE    INR Protime   Date Value Ref Range Status   04/19/2017 1.1 0.86 - 1.14 Final       ASSESSMENT / PLAN  INR assessment SUB    Recheck INR In: 1 WEEK    INR Location Clinic      Anticoagulation Summary as of 4/19/2017     INR goal 2.0-3.0   Today's INR 1.1!   Maintenance plan No maintenance plan   Full instructions 4/19: 10 mg; 4/20: 10 mg; 4/21: 7.5 mg; 4/22: 7.5 mg; 4/23: 7.5 mg; 4/24: 7.5 mg; 4/25: 7.5 mg   Plan last modified Lyudmila Good RN (4/10/2017)   Next INR check 4/26/2017   Target end date     Indications   Deep vein thrombosis (DVT) (H) [I82.409] [I82.409]  Long-term (current) use of anticoagulants [Z79.01] [Z79.01]         Anticoagulation Episode Summary     INR check location     Preferred lab     Send INR reminders to AN ANTICOAG CLINIC    Comments       Anticoagulation Care Providers     Provider Role Specialty Phone number    Carter Skinner PA-C [facility] [phone]            See the Encounter Report to view Anticoagulation Flowsheet and Dosing Calendar (Go to Encounters tab in chart review, and find the Anticoagulation Therapy Visit)        Lyudmila Good RN

## 2017-04-19 NOTE — MR AVS SNAPSHOT
Donato De Leon   4/19/2017 8:45 AM   Anticoagulation Therapy Visit    Description:  47 year old male   Provider:  AN ANTI COAG   Department:  An Nurse           INR as of 4/19/2017     Today's INR 1.1!      Anticoagulation Summary as of 4/19/2017     INR goal 2.0-3.0   Today's INR 1.1!   Full instructions 4/19: 10 mg; 4/20: 10 mg; 4/21: 7.5 mg; 4/22: 7.5 mg; 4/23: 7.5 mg; 4/24: 7.5 mg; 4/25: 7.5 mg   Next INR check 4/26/2017    Indications   Deep vein thrombosis (DVT) (H) [I82.409] [I82.409]  Long-term (current) use of anticoagulants [Z79.01] [Z79.01]         Your next Anticoagulation Clinic appointment(s)     Apr 26, 2017  8:45 AM CDT   Anticoagulation Visit with AN ANTI COAG   [facility]    [address] [address] [phone]              Contact Numbers     [facility]  Please call  [phone] to cancel and/or reschedule your appointment, or with any problems or questions regarding your therapy.        April 2017 Details    Sun Mon Tue Wed Thu Fri Sat           1                 2               3               4               5               6               7               8                 9               10               11               12               13               14               15                 16               17               18               19      10 mg   See details      20      10 mg         21      7.5 mg         22      7.5 mg           23      7.5 mg         24      7.5 mg         25      7.5 mg         26            27               28               29                 30                      Date Details   04/19 This INR check       Date of next INR:  4/26/2017         How to take your warfarin dose     To take:  7.5 mg Take 1.5 of the 5 mg tablets.    To take:  10 mg Take 2 of the 5 mg tablets.

## 2017-04-26 ENCOUNTER — ANTICOAGULATION THERAPY VISIT (OUTPATIENT)
Dept: NURSING | Facility: CLINIC | Age: 47
End: 2017-04-26
Payer: COMMERCIAL

## 2017-04-26 DIAGNOSIS — Z79.01 LONG-TERM (CURRENT) USE OF ANTICOAGULANTS: ICD-10-CM

## 2017-04-26 DIAGNOSIS — I82.409 DEEP VEIN THROMBOSIS (DVT) (H): ICD-10-CM

## 2017-04-26 LAB — INR POINT OF CARE: 3.7 (ref 0.86–1.14)

## 2017-04-26 PROCEDURE — 99207 ZZC NO CHARGE NURSE ONLY: CPT

## 2017-04-26 PROCEDURE — 36416 COLLJ CAPILLARY BLOOD SPEC: CPT

## 2017-04-26 PROCEDURE — 85610 PROTHROMBIN TIME: CPT | Mod: QW

## 2017-04-26 RX ORDER — WARFARIN SODIUM 5 MG/1
TABLET ORAL
Qty: 45 TABLET | Refills: 2 | Status: SHIPPED | OUTPATIENT
Start: 2017-04-26 | End: 2017-07-27

## 2017-04-26 NOTE — MR AVS SNAPSHOT
Donato De Leon   4/26/2017 8:45 AM   Anticoagulation Therapy Visit    Description:  47 year old male   Provider:  AN ANTI COAG   Department:  An Nurse           INR as of 4/26/2017     Today's INR 3.7!      Anticoagulation Summary as of 4/26/2017     INR goal 2.0-3.0   Today's INR 3.7!   Full instructions 4/26: 7.5 mg; 4/27: 5 mg; 4/28: 7.5 mg; 4/29: 7.5 mg; 4/30: 5 mg; 5/1: 7.5 mg; 5/2: 7.5 mg   Next INR check 5/3/2017    Indications   Deep vein thrombosis (DVT) (H) [I82.409] [I82.409]  Long-term (current) use of anticoagulants [Z79.01] [Z79.01]         Your next Anticoagulation Clinic appointment(s)     May 03, 2017  8:15 AM CDT   Anticoagulation Visit with AN ANTI COAG   [facility]    [address] [address] [phone]              Contact Numbers     [facility]  Please call  [phone] to cancel and/or reschedule your appointment, or with any problems or questions regarding your therapy.        April 2017 Details    Sun Mon Tue Wed Thu Fri Sat           1                 2               3               4               5               6               7               8                 9               10               11               12               13               14               15                 16               17               18               19               20               21               22                 23               24               25               26      7.5 mg   See details      27      5 mg         28      7.5 mg         29      7.5 mg           30      5 mg                Date Details   04/26 This INR check               How to take your warfarin dose     To take:  5 mg Take 1 of the 5 mg tablets.    To take:  7.5 mg Take 1.5 of the 5 mg tablets.           May 2017 Details    Sun Mon Tue Wed Thu Fri Sat      1      7.5 mg         2      7.5 mg         3            4               5               6                  7               8               9               10               11               12               13                 14               15               16               17               18               19               20                 21               22               23               24               25               26               27                 28               29               30               31                   Date Details   No additional details    Date of next INR:  5/3/2017         How to take your warfarin dose     To take:  7.5 mg Take 1.5 of the 5 mg tablets.

## 2017-04-26 NOTE — PROGRESS NOTES
ANTICOAGULATION FOLLOW-UP CLINIC VISIT    Patient Name:  Donato De Leon  Date:  4/26/2017  Contact Type:  Face to Face    SUBJECTIVE:     Patient Findings     Positives Initiation of therapy    Comments INR 3.7. Dose adjusted and recheck in 1 wk. Took 7.5 mg this morning. Advised to add some salad to diet.            OBJECTIVE    INR Protime   Date Value Ref Range Status   04/26/2017 3.7 (A) 0.86 - 1.14 Final       ASSESSMENT / PLAN  INR assessment SUPRA    Recheck INR In: 1 WEEK    INR Location Clinic      Anticoagulation Summary as of 4/26/2017     INR goal 2.0-3.0   Today's INR 3.7!   Maintenance plan No maintenance plan   Full instructions 4/26: 7.5 mg; 4/27: 5 mg; 4/28: 7.5 mg; 4/29: 7.5 mg; 4/30: 5 mg; 5/1: 7.5 mg; 5/2: 7.5 mg   Plan last modified Lyudmila Good RN (4/10/2017)   Next INR check 5/3/2017   Target end date     Indications   Deep vein thrombosis (DVT) (H) [I82.409] [I82.409]  Long-term (current) use of anticoagulants [Z79.01] [Z79.01]         Anticoagulation Episode Summary     INR check location     Preferred lab     Send INR reminders to AN ANTICOAG CLINIC    Comments       Anticoagulation Care Providers     Provider Role Specialty Phone number    Carter Skinner PA-C [facility] [phone]            See the Encounter Report to view Anticoagulation Flowsheet and Dosing Calendar (Go to Encounters tab in chart review, and find the Anticoagulation Therapy Visit)        Lyudmila Good RN

## 2017-05-03 ENCOUNTER — OFFICE VISIT (OUTPATIENT)
Dept: FAMILY MEDICINE | Facility: CLINIC | Age: 47
End: 2017-05-03
Payer: COMMERCIAL

## 2017-05-03 ENCOUNTER — ANTICOAGULATION THERAPY VISIT (OUTPATIENT)
Dept: NURSING | Facility: CLINIC | Age: 47
End: 2017-05-03
Payer: COMMERCIAL

## 2017-05-03 VITALS — OXYGEN SATURATION: 98 % | SYSTOLIC BLOOD PRESSURE: 120 MMHG | DIASTOLIC BLOOD PRESSURE: 88 MMHG | HEART RATE: 97 BPM

## 2017-05-03 DIAGNOSIS — Z79.01 LONG-TERM (CURRENT) USE OF ANTICOAGULANTS: ICD-10-CM

## 2017-05-03 DIAGNOSIS — M10.9 ACUTE GOUT OF RIGHT FOOT, UNSPECIFIED CAUSE: Primary | ICD-10-CM

## 2017-05-03 DIAGNOSIS — K51.919 ULCERATIVE COLITIS WITH COMPLICATION, UNSPECIFIED LOCATION (H): ICD-10-CM

## 2017-05-03 DIAGNOSIS — I82.409 DEEP VEIN THROMBOSIS (DVT) (H): ICD-10-CM

## 2017-05-03 LAB
BASOPHILS # BLD AUTO: 0 10E9/L (ref 0–0.2)
BASOPHILS NFR BLD AUTO: 0.4 %
CRP SERPL-MCNC: 35.2 MG/L (ref 0–8)
DIFFERENTIAL METHOD BLD: ABNORMAL
EOSINOPHIL # BLD AUTO: 0.2 10E9/L (ref 0–0.7)
EOSINOPHIL NFR BLD AUTO: 2.1 %
ERYTHROCYTE [DISTWIDTH] IN BLOOD BY AUTOMATED COUNT: 14.5 % (ref 10–15)
ERYTHROCYTE [SEDIMENTATION RATE] IN BLOOD BY WESTERGREN METHOD: 16 MM/H (ref 0–15)
HCT VFR BLD AUTO: 46.9 % (ref 40–53)
HGB BLD-MCNC: 15.8 G/DL (ref 13.3–17.7)
INR POINT OF CARE: 2.5 (ref 0.86–1.14)
LYMPHOCYTES # BLD AUTO: 1.8 10E9/L (ref 0.8–5.3)
LYMPHOCYTES NFR BLD AUTO: 23 %
MCH RBC QN AUTO: 30.2 PG (ref 26.5–33)
MCHC RBC AUTO-ENTMCNC: 33.7 G/DL (ref 31.5–36.5)
MCV RBC AUTO: 90 FL (ref 78–100)
MONOCYTES # BLD AUTO: 1.4 10E9/L (ref 0–1.3)
MONOCYTES NFR BLD AUTO: 17.6 %
NEUTROPHILS # BLD AUTO: 4.6 10E9/L (ref 1.6–8.3)
NEUTROPHILS NFR BLD AUTO: 56.9 %
PLATELET # BLD AUTO: 418 10E9/L (ref 150–450)
RBC # BLD AUTO: 5.24 10E12/L (ref 4.4–5.9)
URATE SERPL-MCNC: 8.5 MG/DL (ref 3.5–7.2)
WBC # BLD AUTO: 8 10E9/L (ref 4–11)

## 2017-05-03 PROCEDURE — 85652 RBC SED RATE AUTOMATED: CPT | Performed by: PHYSICIAN ASSISTANT

## 2017-05-03 PROCEDURE — 99207 ZZC NO CHARGE NURSE ONLY: CPT

## 2017-05-03 PROCEDURE — 84550 ASSAY OF BLOOD/URIC ACID: CPT | Performed by: PHYSICIAN ASSISTANT

## 2017-05-03 PROCEDURE — 85610 PROTHROMBIN TIME: CPT | Mod: QW

## 2017-05-03 PROCEDURE — 36416 COLLJ CAPILLARY BLOOD SPEC: CPT

## 2017-05-03 PROCEDURE — 86140 C-REACTIVE PROTEIN: CPT | Performed by: PHYSICIAN ASSISTANT

## 2017-05-03 PROCEDURE — 99214 OFFICE O/P EST MOD 30 MIN: CPT | Performed by: PHYSICIAN ASSISTANT

## 2017-05-03 PROCEDURE — 85025 COMPLETE CBC W/AUTO DIFF WBC: CPT | Performed by: PHYSICIAN ASSISTANT

## 2017-05-03 RX ORDER — PREDNISONE 20 MG/1
20 TABLET ORAL DAILY
Qty: 50 TABLET | Refills: 0 | Status: SHIPPED | OUTPATIENT
Start: 2017-05-03 | End: 2017-07-25

## 2017-05-03 NOTE — PROGRESS NOTES
ANTICOAGULATION FOLLOW-UP CLINIC VISIT    Patient Name:  Donato De Leon  Date:  5/3/2017  Contact Type:  Face to Face    SUBJECTIVE:     Patient Findings     Comments INR 2.5. Possible gout flare in right foot and seeing provider today. Also diverticulitis and has blood in stools. Will discuss with provider. Discussed with provider. Patient will be starting prednisone taper that should help both gout and ulcerative colitis. Prednisone can both increase or decrease INR. Plan recheck INR in 1 wk.           OBJECTIVE    INR Protime   Date Value Ref Range Status   05/03/2017 2.5 (A) 0.86 - 1.14 Final       ASSESSMENT / PLAN  INR assessment THER    Recheck INR In: 1 WEEK    INR Location Clinic      Anticoagulation Summary as of 5/3/2017     INR goal 2.0-3.0   Today's INR 2.5   Maintenance plan 5 mg (5 mg x 1) on Mon, Wed, Fri; 7.5 mg (5 mg x 1.5) all other days   Full instructions 5 mg on Mon, Wed, Fri; 7.5 mg all other days   Weekly total 45 mg   Plan last modified Lyudmila oGod RN (5/3/2017)   Next INR check 5/10/2017   Target end date     Indications   Deep vein thrombosis (DVT) (H) [I82.409] [I82.409]  Long-term (current) use of anticoagulants [Z79.01] [Z79.01]         Anticoagulation Episode Summary     INR check location     Preferred lab     Send INR reminders to AN ANTICOAG CLINIC    Comments       Anticoagulation Care Providers     Provider Role Specialty Phone number    Carter Skinner PA-C [facility] [phone]            See the Encounter Report to view Anticoagulation Flowsheet and Dosing Calendar (Go to Encounters tab in chart review, and find the Anticoagulation Therapy Visit)        Lyudmila Good RN

## 2017-05-03 NOTE — NURSING NOTE
"Chief Complaint   Patient presents with     Arthritis       Initial /88  Pulse 97  SpO2 98% Estimated body mass index is 32.72 kg/(m^2) as calculated from the following:    Height as of 3/29/17: 6' 1\" (1.854 m).    Weight as of 3/29/17: 248 lb (112.5 kg).  Medication Reconciliation: complete  Mindi Stanley CMA    "

## 2017-05-03 NOTE — PROGRESS NOTES
SUBJECTIVE:                                                    Donato De Leon is a 47 year old male who presents to clinic today for the following health issues:    Gout  Duration: 2-3 days, worse today   Description   Location: foot - right  Joint Swelling: no   Redness: YES  Pain intensity:  severe  Accompanying signs and symptoms: None  History  Previous history of gout: YES   Trauma to the area: no   Precipitating factors:   Alcohol usage: none since Easter  Diuretic use: no   Recent illness: Yes  Therapies tried and outcome: tylenol     History of colitis and seeing GI June 2.   Cont' bloody stools. Fatigue. Doesn't feel well.   Was on prednisone in the past and helped.     Problem list and histories reviewed & adjusted, as indicated.  Additional history: as documented    Patient Active Problem List   Diagnosis     CARDIOVASCULAR SCREENING; LDL GOAL LESS THAN 160     Cellulitis of arm, right     Plantar fasciitis     Cough     Renal lithiasis     Gouty arthritis of toe of right foot     BMI 32.0-32.9,adult     Ulcerative colitis with complication, unspecified location (H)     Deep vein thrombosis (DVT) (H) [I82.409]     Long-term (current) use of anticoagulants [Z79.01]     Acute gout of right foot, unspecified cause     Past Surgical History:   Procedure Laterality Date     HC FRAGMENTING OF KIDNEY STONE         Social History   Substance Use Topics     Smoking status: Never Smoker     Smokeless tobacco: Never Used     Alcohol use Yes      Comment: rare     History reviewed. No pertinent family history.      Current Outpatient Prescriptions   Medication Sig Dispense Refill     predniSONE (DELTASONE) 20 MG tablet Take 1 tablet (20 mg) by mouth daily 50 tablet 0     warfarin (COUMADIN) 5 MG tablet Daily as directed. Start at 7.5mg daily 45 tablet 2     Allergies   Allergen Reactions     Nkda [No Known Drug Allergies]      Labs reviewed in EPIC    ROS:  Constitutional, HEENT, cardiovascular, pulmonary, gi and  gu systems are negative, except as otherwise noted.    OBJECTIVE:                                                    /88  Pulse 97  SpO2 98%  There is no height or weight on file to calculate BMI.  GENERAL: healthy, alert and no distress  RESP: lungs clear to auscultation - no rales, rhonchi or wheezes  CV: regular rate and rhythm, normal S1 S2, no S3 or S4, no murmur, click or rub, no peripheral edema and peripheral pulses strong  ABDOMEN: soft, nontender, no hepatosplenomegaly, no masses and bowel sounds normal  MS: right foot: later foot diffuse tenderness and redness.    Diagnostic Test Results:  Results for orders placed or performed in visit on 05/03/17 (from the past 24 hour(s))   CBC with platelets differential   Result Value Ref Range    WBC 8.0 4.0 - 11.0 10e9/L    RBC Count 5.24 4.4 - 5.9 10e12/L    Hemoglobin 15.8 13.3 - 17.7 g/dL    Hematocrit 46.9 40.0 - 53.0 %    MCV 90 78 - 100 fl    MCH 30.2 26.5 - 33.0 pg    MCHC 33.7 31.5 - 36.5 g/dL    RDW 14.5 10.0 - 15.0 %    Platelet Count 418 150 - 450 10e9/L    Diff Method Automated Method     % Neutrophils 56.9 %    % Lymphocytes 23.0 %    % Monocytes 17.6 %    % Eosinophils 2.1 %    % Basophils 0.4 %    Absolute Neutrophil 4.6 1.6 - 8.3 10e9/L    Absolute Lymphocytes 1.8 0.8 - 5.3 10e9/L    Absolute Monocytes 1.4 (H) 0.0 - 1.3 10e9/L    Absolute Eosinophils 0.2 0.0 - 0.7 10e9/L    Absolute Basophils 0.0 0.0 - 0.2 10e9/L   ESR: Erythrocyte sedimentation rate   Result Value Ref Range    Sed Rate 16 (H) 0 - 15 mm/h        ASSESSMENT/PLAN:                                                        ICD-10-CM    1. Acute gout of right foot, unspecified cause M10.9 ESR: Erythrocyte sedimentation rate     CRP, inflammation     predniSONE (DELTASONE) 20 MG tablet     Uric acid   2. Ulcerative colitis with complication, unspecified location (H) K51.919 CBC with platelets differential     ESR: Erythrocyte sedimentation rate     CRP, inflammation     predniSONE  (DELTASONE) 20 MG tablet   discussed with Dr. Krystal Warner  Prednisone taper- keep follow up  With GI.   warning signs discussed. side effects discussed  Follow up  With INR clinic as he is on warfarin.     Carter Skinner PA-C  [facility]

## 2017-05-03 NOTE — MR AVS SNAPSHOT
Donato De Leon   5/3/2017 8:15 AM   Anticoagulation Therapy Visit    Description:  47 year old male   Provider:  AN ANTI COAG   Department:  An Nurse           INR as of 5/3/2017     Today's INR 2.5      Anticoagulation Summary as of 5/3/2017     INR goal 2.0-3.0   Today's INR 2.5   Full instructions 5 mg on Mon, Wed, Fri; 7.5 mg all other days   Next INR check 5/10/2017    Indications   Deep vein thrombosis (DVT) (H) [I82.409] [I82.409]  Long-term (current) use of anticoagulants [Z79.01] [Z79.01]         Your next Anticoagulation Clinic appointment(s)     May 10, 2017  9:30 AM CDT   [facility] with AN ANTI [facility]AG   [facility]    [address] [facility] [id number] [phone]              Contact Numbers     [facility]  Please call  [phone] to cancel and/or reschedule your appointment, or with any problems or questions regarding your therapy.        May 2017 Details    Sun Mon Tue Wed Thu Fri Sat      1               2               3      5 mg   See details      4      7.5 mg         5      5 mg         6      7.5 mg           7      7.5 mg         8      5 mg         9      7.5 mg         10            11               12               13                 14               15               16               17               18               19               20                 21               22               23               24               25               26               27                 28               29               30               31                   Date Details   05/03 This INR check       Date of next INR:  5/10/2017         How to take your warfarin dose     To take:  5 mg Take 1 of the 5 mg tablets.    To take:  7.5 mg Take 1.5 of the 5 mg tablets.

## 2017-05-03 NOTE — MR AVS SNAPSHOT
After Visit Summary   5/3/2017    Donato De Leon    MRN: [id number]           Patient Information     Date Of Birth          1970        Visit Information        Provider Department      5/3/2017 10:40 AM Carter Skinner PA-C [facility]        Today's Diagnoses     Acute gout of right foot, unspecified cause    -  1    Ulcerative colitis with complication, unspecified location (H)           Follow-ups after your visit        Your next 10 appointments already scheduled     May 10, 2017  9:30 AM CDT   Anticoagulation Visit with AN ANTI COAG   [facility]    [address] [facility] [address] [phone]              Who to contact     If you have questions or need follow up information about today's clinic visit or your schedule please contact [facility] directly at [phone].  Normal or non-critical lab and imaging results will be communicated to you by MyChart, letter or phone within 4 business days after the clinic has received the results. If you do not hear from us within 7 days, please contact the clinic through Every[employer] or phone. If you have a critical or abnormal lab result, we will notify you by phone as soon as possible.  Submit refill requests through [employer] or call your pharmacy and they will forward the refill request to us. Please allow 3 business days for your refill to be completed.          Additional Information About Your Visit        MyChart Information     [employer] gives you secure access to your electronic health record. If you see a primary care provider, you can also send messages to your care team and make appointments. If you have questions, please call your primary care clinic.  If you do not have a primary care provider, please call [phone] and they will assist you.        Care EveryWhere ID     This is your Care EveryWhere ID. This could be used by other organizations to  access your [place] medical records  [id number]        Your Vitals Were     Pulse Pulse Oximetry                97 98%           Blood Pressure from Last 3 Encounters:   05/03/17 120/88   03/29/17 131/83   12/20/16 131/84    Weight from Last 3 Encounters:   03/29/17 248 lb (112.5 kg)   04/19/16 253 lb (114.8 kg)   03/20/16 256 lb (116.1 kg)              We Performed the Following     CBC with platelets differential     CRP, inflammation     ESR: Erythrocyte sedimentation rate     Uric acid          Today's Medication Changes          These changes are accurate as of: 5/3/17 12:29 PM.  If you have any questions, ask your nurse or doctor.               Start taking these medicines.        Dose/Directions    predniSONE 20 MG tablet   Commonly known as:  DELTASONE   Used for:  Acute gout of right foot, unspecified cause, Ulcerative colitis with complication, unspecified location (H)   Started by:  Carter Skinner PA-C        Dose:  20 mg   Take 1 tablet (20 mg) by mouth daily   Quantity:  50 tablet   Refills:  0            Where to get your medicines      Some of these will need a paper prescription and others can be bought over the counter.  Ask your nurse if you have questions.     Bring a paper prescription for each of these medications     predniSONE 20 MG tablet                Primary Care Provider Office Phone # Fax #    Carter Skinner PA-C [phone]       [facility] [address] [facility] [address]        Thank you!     Thank you for choosing [facility]  for your care. Our goal is always to provide you with excellent care. Hearing back from our patients is one way we can continue to improve our services. Please take a few minutes to complete the written survey that you may receive in the mail after your visit with us. Thank you!             Your Updated Medication List - Protect others around you: Learn how to safely use, store and throw away your  medicines at www.disposemymeds.org.          This list is accurate as of: 5/3/17 12:29 PM.  Always use your most recent med list.                   Brand Name Dispense Instructions for use    predniSONE 20 MG tablet    DELTASONE    50 tablet    Take 1 tablet (20 mg) by mouth daily       warfarin 5 MG tablet    COUMADIN    45 tablet    Daily as directed. Start at 7.5mg daily

## 2017-05-10 ENCOUNTER — ANTICOAGULATION THERAPY VISIT (OUTPATIENT)
Dept: NURSING | Facility: CLINIC | Age: 47
End: 2017-05-10
Payer: COMMERCIAL

## 2017-05-10 DIAGNOSIS — I82.409 DEEP VEIN THROMBOSIS (DVT) (H): ICD-10-CM

## 2017-05-10 DIAGNOSIS — Z79.01 LONG-TERM (CURRENT) USE OF ANTICOAGULANTS: ICD-10-CM

## 2017-05-10 LAB — INR POINT OF CARE: 2.4 (ref 0.86–1.14)

## 2017-05-10 PROCEDURE — 85610 PROTHROMBIN TIME: CPT | Mod: QW

## 2017-05-10 PROCEDURE — 36416 COLLJ CAPILLARY BLOOD SPEC: CPT

## 2017-05-10 PROCEDURE — 99207 ZZC NO CHARGE NURSE ONLY: CPT

## 2017-05-10 NOTE — PROGRESS NOTES
ANTICOAGULATION FOLLOW-UP CLINIC VISIT    Patient Name:  Donato De Leon  Date:  5/10/2017  Contact Type:  Face to Face    SUBJECTIVE:     Patient Findings     Positives No Problem Findings           OBJECTIVE    INR Protime   Date Value Ref Range Status   05/10/2017 2.4 (A) 0.86 - 1.14 Final       ASSESSMENT / PLAN  INR assessment THER    Recheck INR In: 1 WEEK    INR Location Clinic      Anticoagulation Summary as of 5/10/2017     INR goal 2.0-3.0   Today's INR 2.4   Maintenance plan 5 mg (5 mg x 1) on Mon, Wed, Fri; 7.5 mg (5 mg x 1.5) all other days   Full instructions 5 mg on Mon, Wed, Fri; 7.5 mg all other days   Weekly total 45 mg   No change documented Chanelle Fu RN   Plan last modified Lyudmila Good RN (5/3/2017)   Next INR check 5/18/2017   Target end date     Indications   Deep vein thrombosis (DVT) (H) [I82.409] [I82.409]  Long-term (current) use of anticoagulants [Z79.01] [Z79.01]         Anticoagulation Episode Summary     INR check location     Preferred lab     Send INR reminders to AN ANTICOAG CLINIC    Comments       Anticoagulation Care Providers     Provider Role Specialty Phone number    Brody, Cartre Romo PA-C [facility] [phone]            See the Encounter Report to view Anticoagulation Flowsheet and Dosing Calendar (Go to Encounters tab in chart review, and find the Anticoagulation Therapy Visit)        Chanelle Fu, RN

## 2017-05-10 NOTE — MR AVS SNAPSHOT
Donato De Leon   5/10/2017 9:30 AM   Anticoagulation Therapy Visit    Description:  47 year old male   Provider:  AN ANTI COAG   Department:  An Nurse           INR as of 5/10/2017     Today's INR 2.4      Anticoagulation Summary as of 5/10/2017     INR goal 2.0-3.0   Today's INR 2.4   Full instructions 5 mg on Mon, Wed, Fri; 7.5 mg all other days   Next INR check 5/18/2017    Indications   Deep vein thrombosis (DVT) (H) [I82.409] [I82.409]  Long-term (current) use of anticoagulants [Z79.01] [Z79.01]         Your next Anticoagulation Clinic appointment(s)     May 18, 2017  9:45 AM CDT   Anticoagulation Visit with AN ANTI COAG   [facility]    [address] [facility] [id number] [phone]              Contact Numbers     [facility]  Please call  [phone] to cancel and/or reschedule your appointment, or with any problems or questions regarding your therapy.        May 2017 Details    Sun Mon Tue Wed Thu Fri Sat      1               2               3               4               5               6                 7               8               9               10      5 mg   See details      11      7.5 mg         12      5 mg         13      7.5 mg           14      7.5 mg         15      5 mg         16      7.5 mg         17      5 mg         18            19               20                 21               22               23               24               25               26               27                 28               29               30               31                   Date Details   05/10 This INR check       Date of next INR:  5/18/2017         How to take your warfarin dose     To take:  5 mg Take 1 of the 5 mg tablets.    To take:  7.5 mg Take 1.5 of the 5 mg tablets.

## 2017-05-19 ENCOUNTER — ANTICOAGULATION THERAPY VISIT (OUTPATIENT)
Dept: NURSING | Facility: CLINIC | Age: 47
End: 2017-05-19
Payer: COMMERCIAL

## 2017-05-19 DIAGNOSIS — Z79.01 LONG-TERM (CURRENT) USE OF ANTICOAGULANTS: ICD-10-CM

## 2017-05-19 DIAGNOSIS — I82.409 DEEP VEIN THROMBOSIS (DVT) (H): ICD-10-CM

## 2017-05-19 LAB — INR POINT OF CARE: 2.5 (ref 0.86–1.14)

## 2017-05-19 PROCEDURE — 36416 COLLJ CAPILLARY BLOOD SPEC: CPT

## 2017-05-19 PROCEDURE — 85610 PROTHROMBIN TIME: CPT | Mod: QW

## 2017-05-19 PROCEDURE — 99207 ZZC NO CHARGE NURSE ONLY: CPT

## 2017-05-19 NOTE — MR AVS SNAPSHOT
Donato De Leon   5/19/2017 8:30 AM   Anticoagulation Therapy Visit    Description:  47 year old male   Provider:  AN ANTI COAG   Department:  An Nurse           INR as of 5/19/2017     Today's INR 2.5      Anticoagulation Summary as of 5/19/2017     INR goal 2.0-3.0   Today's INR 2.5   Full instructions 5 mg on Mon, Wed, Fri; 7.5 mg all other days   Next INR check 6/9/2017    Indications   Deep vein thrombosis (DVT) (H) [I82.409] [I82.409]  Long-term (current) use of anticoagulants [Z79.01] [Z79.01]         Your next Anticoagulation Clinic appointment(s)     Jun 09, 2017  8:30 AM CDT   Anticoagulation Visit with AN ANTI COAG   [facility]    [address] [facility] [address] [phone]              Contact Numbers     [facility]  Please call  [phone] to cancel and/or reschedule your appointment, or with any problems or questions regarding your therapy.        May 2017 Details    Sun Mon Tue Wed Thu Fri Sat      1               2               3               4               5               6                 7               8               9               10               11               12               13                 14               15               16               17               18               19      5 mg   See details      20      7.5 mg           21      7.5 mg         22      5 mg         23      7.5 mg         24      5 mg         25      7.5 mg         26      5 mg         27      7.5 mg           28      7.5 mg         29      5 mg         30      7.5 mg         31      5 mg             Date Details   05/19 This INR check               How to take your warfarin dose     To take:  5 mg Take 1 of the 5 mg tablets.    To take:  7.5 mg Take 1.5 of the 5 mg tablets.           June 2017 Details    Sun Mon Tue Wed Thu Fri Sat         1      7.5 mg         2      5 mg         3      7.5 mg           4      7.5 mg         5      5 mg          6      7.5 mg         7      5 mg         8      7.5 mg         9            10                 11               12               13               14               15               16               17                 18               19               20               21               22               23               24                 25               26               27               28               29               30                 Date Details   No additional details    Date of next INR:  6/9/2017         How to take your warfarin dose     To take:  5 mg Take 1 of the 5 mg tablets.    To take:  7.5 mg Take 1.5 of the 5 mg tablets.

## 2017-05-19 NOTE — PROGRESS NOTES
ANTICOAGULATION FOLLOW-UP CLINIC VISIT    Patient Name:  Donato De Leon  Date:  5/19/2017  Contact Type:  Face to Face    SUBJECTIVE:     Patient Findings     Positives No Problem Findings    Comments Patient will be having a colonoscopy on June 2nd. Patient will be holding coumadin x5 days prior to procedure then resuming dose after procedure.   .Chanelle LYNNE, RN, CPN             OBJECTIVE    INR Protime   Date Value Ref Range Status   05/19/2017 2.5 (A) 0.86 - 1.14 Final       ASSESSMENT / PLAN  INR assessment THER    Recheck INR In: 3 WEEKS    INR Location Clinic      Anticoagulation Summary as of 5/19/2017     INR goal 2.0-3.0   Today's INR 2.5   Maintenance plan 5 mg (5 mg x 1) on Mon, Wed, Fri; 7.5 mg (5 mg x 1.5) all other days   Full instructions 5 mg on Mon, Wed, Fri; 7.5 mg all other days   Weekly total 45 mg   No change documented Chanelle Fu RN   Plan last modified Lyudmila Good RN (5/3/2017)   Next INR check 6/9/2017   Target end date     Indications   Deep vein thrombosis (DVT) (H) [I82.409] [I82.409]  Long-term (current) use of anticoagulants [Z79.01] [Z79.01]         Anticoagulation Episode Summary     INR check location     Preferred lab     Send INR reminders to AN ANTICOAG CLINIC    Comments       Anticoagulation Care Providers     Provider Role Specialty Phone number    Brody, Carter Romo PA-C [facility] [phone]            See the Encounter Report to view Anticoagulation Flowsheet and Dosing Calendar (Go to Encounters tab in chart review, and find the Anticoagulation Therapy Visit)        Chanelle Fu, AISSATOU

## 2017-06-02 ENCOUNTER — TRANSFERRED RECORDS (OUTPATIENT)
Dept: HEALTH INFORMATION MANAGEMENT | Facility: CLINIC | Age: 47
End: 2017-06-02

## 2017-06-09 ENCOUNTER — ANTICOAGULATION THERAPY VISIT (OUTPATIENT)
Dept: NURSING | Facility: CLINIC | Age: 47
End: 2017-06-09
Payer: COMMERCIAL

## 2017-06-09 DIAGNOSIS — I82.409 DEEP VEIN THROMBOSIS (DVT) (H): ICD-10-CM

## 2017-06-09 DIAGNOSIS — Z79.01 LONG-TERM (CURRENT) USE OF ANTICOAGULANTS: ICD-10-CM

## 2017-06-09 LAB — INR POINT OF CARE: 1.8 (ref 0.86–1.14)

## 2017-06-09 PROCEDURE — 85610 PROTHROMBIN TIME: CPT | Mod: QW

## 2017-06-09 PROCEDURE — 36416 COLLJ CAPILLARY BLOOD SPEC: CPT

## 2017-06-09 PROCEDURE — 99207 ZZC NO CHARGE NURSE ONLY: CPT

## 2017-06-09 NOTE — PROGRESS NOTES
ANTICOAGULATION FOLLOW-UP CLINIC VISIT    Patient Name:  Donato De Leon  Date:  6/9/2017  Contact Type:  Face to Face    SUBJECTIVE:     Patient Findings     Positives Intentional hold of therapy    Comments Recent hold for colonoscopy. Resumed coumadin last Saturday and he adjusted his dose. INR 1. Today. Resume previous weekly dose and recheck in 6 days. Reports colonoscopy showed ulcerative colitis and he will be following up with GI. Reports was given new rx for prednisone taper from GI after colonoscopy. Denies any bleeding. Call if changes.            OBJECTIVE    INR Protime   Date Value Ref Range Status   06/09/2017 1.8 (A) 0.86 - 1.14 Final       ASSESSMENT / PLAN  INR assessment SUB    Recheck INR In: 6 DAYS    INR Location Clinic      Anticoagulation Summary as of 6/9/2017     INR goal 2.0-3.0   Today's INR 1.8!   Maintenance plan 5 mg (5 mg x 1) on Mon, Wed, Fri; 7.5 mg (5 mg x 1.5) all other days   Full instructions 5 mg on Mon, Wed, Fri; 7.5 mg all other days   Weekly total 45 mg   Plan last modified Lyudmila Good RN (5/3/2017)   Next INR check 6/15/2017   Target end date     Indications   Deep vein thrombosis (DVT) (H) [I82.409] [I82.409]  Long-term (current) use of anticoagulants [Z79.01] [Z79.01]         Anticoagulation Episode Summary     INR check location     Preferred lab     Send INR reminders to AN ANTICOAG CLINIC    Comments       Anticoagulation Care Providers     Provider Role Specialty Phone number    Carter Skinner PA-C [facility] [phone]            See the Encounter Report to view Anticoagulation Flowsheet and Dosing Calendar (Go to Encounters tab in chart review, and find the Anticoagulation Therapy Visit)    Dosage adjustment made based on physician directed care plan.    Lyudmila Good RN

## 2017-06-09 NOTE — MR AVS SNAPSHOT
Donato De Leon   6/9/2017 8:30 AM   Anticoagulation Therapy Visit    Description:  47 year old male   Provider:  AN ANTI COAG   Department:  An Nurse           INR as of 6/9/2017     Today's INR 1.8!      Anticoagulation Summary as of 6/9/2017     INR goal 2.0-3.0   Today's INR 1.8!   Full instructions 5 mg on Mon, Wed, Fri; 7.5 mg all other days   Next INR check 6/15/2017    Indications   Deep vein thrombosis (DVT) (H) [I82.409] [I82.409]  Long-term (current) use of anticoagulants [Z79.01] [Z79.01]         Your next Anticoagulation Clinic appointment(s)     Samy 15, 2017  8:00 AM CDT   Anticoagulation Visit with AN ANTI COAG   [facility]    [address] [facility] [id number] [phone]              Contact Numbers     [facility]  Please call  [phone] to cancel and/or reschedule your appointment, or with any problems or questions regarding your therapy.        June 2017 Details    Sun Mon Tue Wed Thu Fri Sat         1               2               3                 4               5               6               7               8               9      5 mg   See details      10      7.5 mg           11      7.5 mg         12      5 mg         13      7.5 mg         14      5 mg         15            16               17                 18               19               20               21               22               23               24                 25               26               27               28               29               30                 Date Details   06/09 This INR check       Date of next INR:  6/15/2017         How to take your warfarin dose     To take:  5 mg Take 1 of the 5 mg tablets.    To take:  7.5 mg Take 1.5 of the 5 mg tablets.

## 2017-06-15 ENCOUNTER — ANTICOAGULATION THERAPY VISIT (OUTPATIENT)
Dept: NURSING | Facility: CLINIC | Age: 47
End: 2017-06-15
Payer: COMMERCIAL

## 2017-06-15 DIAGNOSIS — Z79.01 LONG-TERM (CURRENT) USE OF ANTICOAGULANTS: ICD-10-CM

## 2017-06-15 DIAGNOSIS — I82.409 DEEP VEIN THROMBOSIS (DVT) (H): ICD-10-CM

## 2017-06-15 LAB — INR POINT OF CARE: 1.2 (ref 0.86–1.14)

## 2017-06-15 PROCEDURE — 99207 ZZC NO CHARGE NURSE ONLY: CPT

## 2017-06-15 PROCEDURE — 85610 PROTHROMBIN TIME: CPT | Mod: QW

## 2017-06-15 PROCEDURE — 36416 COLLJ CAPILLARY BLOOD SPEC: CPT

## 2017-06-15 NOTE — MR AVS SNAPSHOT
Donato De Leon   6/15/2017 8:00 AM   Anticoagulation Therapy Visit    Description:  47 year old male   Provider:  AN ANTI COAG   Department:  An Nurse           INR as of 6/15/2017     Today's INR 1.2!      Anticoagulation Summary as of 6/15/2017     INR goal 2.0-3.0   Today's INR 1.2!   Full instructions 6/15: 10 mg; 6/16: 10 mg; 6/19: 7.5 mg; 6/21: 7.5 mg; Otherwise 5 mg on Mon, Wed, Fri; 7.5 mg all other days   Next INR check 6/23/2017    Indications   Deep vein thrombosis (DVT) (H) [I82.409] [I82.409]  Long-term (current) use of anticoagulants [Z79.01] [Z79.01]         Your next Anticoagulation Clinic appointment(s)     Jun 23, 2017  8:30 AM CDT   Anticoagulation Visit with AN ANTI COAG   [facility]    [address] [facility] [address] [phone]              Contact Numbers     [facility]  Please call  [phone] to cancel and/or reschedule your appointment, or with any problems or questions regarding your therapy.        June 2017 Details    Sun Mon Tue Wed Thu Fri Sat         1               2               3                 4               5               6               7               8               9               10                 11               12               13               14               15      10 mg   See details      16      10 mg         17      7.5 mg           18      7.5 mg         19      7.5 mg         20      7.5 mg         21      7.5 mg         22      7.5 mg         23            24                 25               26               27               28               29               30                 Date Details   06/15 This INR check       Date of next INR:  6/23/2017         How to take your warfarin dose     To take:  5 mg Take 1 of the 5 mg tablets.    To take:  7.5 mg Take 1.5 of the 5 mg tablets.    To take:  10 mg Take 2 of the 5 mg tablets.

## 2017-06-15 NOTE — PROGRESS NOTES
ANTICOAGULATION FOLLOW-UP CLINIC VISIT    Patient Name:  Donato De Leon  Date:  6/15/2017  Contact Type:  Face to Face    SUBJECTIVE:     Patient Findings     Positives Initiation of therapy    Comments Sub. Feeling better and feels the ulcerative colitis is under control. Still on the prednisone taper. Eating regular diet. No bleeding. Dose increased and recheck in 1 wk.            OBJECTIVE    INR Protime   Date Value Ref Range Status   06/15/2017 1.2 (A) 0.86 - 1.14 Final       ASSESSMENT / PLAN  INR assessment SUB    Recheck INR In: 8 DAYS    INR Location Clinic      Anticoagulation Summary as of 6/15/2017     INR goal 2.0-3.0   Today's INR 1.2!   Maintenance plan 5 mg (5 mg x 1) on Mon, Wed, Fri; 7.5 mg (5 mg x 1.5) all other days   Full instructions 6/15: 10 mg; 6/16: 10 mg; 6/19: 7.5 mg; 6/21: 7.5 mg; Otherwise 5 mg on Mon, Wed, Fri; 7.5 mg all other days   Weekly total 45 mg   Plan last modified Lyudmila Good RN (5/3/2017)   Next INR check 6/23/2017   Target end date     Indications   Deep vein thrombosis (DVT) (H) [I82.409] [I82.409]  Long-term (current) use of anticoagulants [Z79.01] [Z79.01]         Anticoagulation Episode Summary     INR check location     Preferred lab     Send INR reminders to AN ANTICOAG CLINIC    Comments       Anticoagulation Care Providers     Provider Role Specialty Phone number    Carter Skinner PA-C [facility] [phone]            See the Encounter Report to view Anticoagulation Flowsheet and Dosing Calendar (Go to Encounters tab in chart review, and find the Anticoagulation Therapy Visit)    Dosage adjustment made based on physician directed care plan.    Lyudmila Good RN

## 2017-06-23 ENCOUNTER — ANTICOAGULATION THERAPY VISIT (OUTPATIENT)
Dept: NURSING | Facility: CLINIC | Age: 47
End: 2017-06-23
Payer: COMMERCIAL

## 2017-06-23 DIAGNOSIS — I82.409 DEEP VEIN THROMBOSIS (DVT) (H): ICD-10-CM

## 2017-06-23 DIAGNOSIS — Z79.01 LONG-TERM (CURRENT) USE OF ANTICOAGULANTS: ICD-10-CM

## 2017-06-23 LAB — INR POINT OF CARE: 1.4 (ref 0.86–1.14)

## 2017-06-23 PROCEDURE — 85610 PROTHROMBIN TIME: CPT | Mod: QW

## 2017-06-23 PROCEDURE — 99207 ZZC NO CHARGE NURSE ONLY: CPT

## 2017-06-23 PROCEDURE — 36416 COLLJ CAPILLARY BLOOD SPEC: CPT

## 2017-06-23 NOTE — PROGRESS NOTES
ANTICOAGULATION FOLLOW-UP CLINIC VISIT    Patient Name:  Donato De Leon  Date:  6/23/2017  Contact Type:  Face to Face    SUBJECTIVE:     Patient Findings     Comments Sub. Still on prednisone taper for ulcerative colitis at least several weeks. Current dose is 20 mg daily. Feeling good and no bleeding concerns.            OBJECTIVE    INR Protime   Date Value Ref Range Status   06/23/2017 1.4 (A) 0.86 - 1.14 Final       ASSESSMENT / PLAN  INR assessment SUB    Recheck INR In: 6 DAYS    INR Location Clinic      Anticoagulation Summary as of 6/23/2017     INR goal 2.0-3.0   Today's INR 1.4!   Maintenance plan 10 mg (5 mg x 2) every day   Full instructions 10 mg every day   Weekly total 70 mg   Plan last modified Lyudmila Good RN (6/23/2017)   Next INR check 6/29/2017   Target end date     Indications   Deep vein thrombosis (DVT) (H) [I82.409] [I82.409]  Long-term (current) use of anticoagulants [Z79.01] [Z79.01]         Anticoagulation Episode Summary     INR check location     Preferred lab     Send INR reminders to AN ANTICOAG CLINIC    Comments       Anticoagulation Care Providers     Provider Role Specialty Phone number    Carter Skinner PA-C [facility] [phone]            See the Encounter Report to view Anticoagulation Flowsheet and Dosing Calendar (Go to Encounters tab in chart review, and find the Anticoagulation Therapy Visit)    Dosage adjustment made based on physician directed care plan.    Lyudmila Good RN

## 2017-06-23 NOTE — MR AVS SNAPSHOT
Donato De Leon   6/23/2017 8:30 AM   Anticoagulation Therapy Visit    Description:  47 year old male   Provider:  AN ANTI COAG   Department:  An Nurse           INR as of 6/23/2017     Today's INR 1.4!      Anticoagulation Summary as of 6/23/2017     INR goal 2.0-3.0   Today's INR 1.4!   Full instructions 10 mg every day   Next INR check 6/29/2017    Indications   Deep vein thrombosis (DVT) (H) [I82.409] [I82.409]  Long-term (current) use of anticoagulants [Z79.01] [Z79.01]         Your next Anticoagulation Clinic appointment(s)     Jun 29, 2017 10:00 AM CDT   Anticoagulation [facility] with AN ANTI [facility]AG   [facility]    [address] [facility] [address] [phone]              Contact Numbers     [facility]  Please call  [phone] to cancel and/or reschedule your appointment, or with any problems or questions regarding your therapy.        June 2017 Details    Sun Mon Tue Wed Thu Fri Sat         1               2               3                 4               5               6               7               8               9               10                 11               12               13               14               15               16               17                 18               19               20               21               22               23      10 mg   See details      24      10 mg           25      10 mg         26      10 mg         27      10 mg         28      10 mg         29            30                 Date Details   06/23 This INR check       Date of next INR:  6/29/2017         How to take your warfarin dose     To take:  10 mg Take 2 of the 5 mg tablets.

## 2017-06-29 ENCOUNTER — ANTICOAGULATION THERAPY VISIT (OUTPATIENT)
Dept: NURSING | Facility: CLINIC | Age: 47
End: 2017-06-29
Payer: COMMERCIAL

## 2017-06-29 DIAGNOSIS — Z79.01 LONG-TERM (CURRENT) USE OF ANTICOAGULANTS: ICD-10-CM

## 2017-06-29 DIAGNOSIS — I82.409 DEEP VEIN THROMBOSIS (DVT) (H): ICD-10-CM

## 2017-06-29 LAB — INR POINT OF CARE: 2.8 (ref 0.86–1.14)

## 2017-06-29 PROCEDURE — 36416 COLLJ CAPILLARY BLOOD SPEC: CPT

## 2017-06-29 PROCEDURE — 99207 ZZC NO CHARGE NURSE ONLY: CPT

## 2017-06-29 PROCEDURE — 85610 PROTHROMBIN TIME: CPT | Mod: QW

## 2017-06-29 NOTE — MR AVS SNAPSHOT
Donato De Leon   6/29/2017 10:00 AM   Anticoagulation Therapy Visit    Description:  47 year old male   Provider:  AN ANTI COAG   Department:  An Nurse           INR as of 6/29/2017     Today's INR 2.8      Anticoagulation Summary as of 6/29/2017     INR goal 2.0-3.0   Today's INR 2.8   Full instructions 6/29: 10 mg; 6/30: 5 mg; 7/1: 7.5 mg; 7/2: 10 mg; 7/3: 10 mg; 7/4: 7.5 mg   Next INR check 7/5/2017    Indications   Deep vein thrombosis (DVT) (H) [I82.409] [I82.409]  Long-term (current) use of anticoagulants [Z79.01] [Z79.01]         Your next Anticoagulation Clinic appointment(s)     Jul 05, 2017  8:15 AM CDT   [facility] AN [facility]AG   [facility]    [address] [facility] [address] [phone]              Contact Numbers     [facility]  Please call  [phone] to cancel and/or reschedule your appointment, or with any problems or questions regarding your therapy.        June 2017 Details    Sun Mon Tue Wed u Fri Sat         1               2               3                 4               5               6               7               8               9               10                 11               12               13               14               15               16               17                 18               19               20               21               22               23               24                 25               26               27               28               29      10 mg   See details      30      5 mg           Date Details   06/29 This INR check               How to take your warfarin dose     To take:  5 mg Take 1 of the 5 mg tablets.    To take:  10 mg Take 2 of the 5 mg tablets.           July 2017 Details    Sun Mon Tue Wed Thu Fri Sat           1      7.5 mg           2      10 mg         3      10 mg         4      7.5 mg         5            6               7               8                  9               10               11               12               13               14               15                 16               17               18               19               20               21               22                 23               24               25               26               27               28               29                 30               31                     Date Details   No additional details    Date of next INR:  7/5/2017         How to take your warfarin dose     To take:  7.5 mg Take 1.5 of the 5 mg tablets.    To take:  10 mg Take 2 of the 5 mg tablets.

## 2017-06-29 NOTE — PROGRESS NOTES
ANTICOAGULATION FOLLOW-UP CLINIC VISIT    Patient Name:  Donato De Leon  Date:  6/29/2017  Contact Type:  Face to Face    SUBJECTIVE:     Patient Findings     Positives Blood in stool (reports colitis symptoms are worse again and having some blood in stoolss), Initiation of therapy    Comments INR 2.8. Now therapeutic but starting to have relapse of ulcerative colitis symptoms with some blood in stools. He thinks this is a combination of prednisone dose being tapered and warfarin. Will be following up with GI but this is weeks away. Advised he be seen and apt scheduled for today with pcp. He will also call GI and see if can speak with provider or nurse there to advise. Warfarin dose adjusted due to bleeding and the INR had increased from 1.4 to 2.8 in 6 days with dose increase. Would like to see INR in low end of range due to GI bleed risks but is difficult due to prednisone taper and inflammation. Took 10 mg dose today.           OBJECTIVE    INR Protime   Date Value Ref Range Status   06/29/2017 2.8 (A) 0.86 - 1.14 Final       ASSESSMENT / PLAN  INR assessment THER    Recheck INR In: 6 DAYS    INR Location Clinic      Anticoagulation Summary as of 6/29/2017     INR goal 2.0-3.0   Today's INR 2.8   Maintenance plan No maintenance plan   Full instructions 6/29: 10 mg; 6/30: 5 mg; 7/1: 7.5 mg; 7/2: 10 mg; 7/3: 10 mg; 7/4: 7.5 mg   Plan last modified Lyudmila Good, RN (6/29/2017)   Next INR check 7/5/2017   Target end date     Indications   Deep vein thrombosis (DVT) (H) [I82.409] [I82.409]  Long-term (current) use of anticoagulants [Z79.01] [Z79.01]         Anticoagulation Episode Summary     INR check location     Preferred lab     Send INR reminders to AN ANTICOAG CLINIC    Comments       Anticoagulation Care Providers     Provider Role Specialty Phone number    Brody, Carter Romo PA-C [facility] [phone]            See the Encounter Report to view Anticoagulation Flowsheet and  Dosing Calendar (Go to Encounters tab in chart review, and find the Anticoagulation Therapy Visit)    Dosage adjustment made based on physician directed care plan.    Lyudmila Good RN

## 2017-07-05 ENCOUNTER — ANTICOAGULATION THERAPY VISIT (OUTPATIENT)
Dept: NURSING | Facility: CLINIC | Age: 47
End: 2017-07-05
Payer: COMMERCIAL

## 2017-07-05 DIAGNOSIS — Z79.01 LONG-TERM (CURRENT) USE OF ANTICOAGULANTS: ICD-10-CM

## 2017-07-05 DIAGNOSIS — I82.409 DEEP VEIN THROMBOSIS (DVT) (H): ICD-10-CM

## 2017-07-05 LAB — INR POINT OF CARE: 2.3 (ref 0.86–1.14)

## 2017-07-05 PROCEDURE — 99207 ZZC NO CHARGE NURSE ONLY: CPT

## 2017-07-05 PROCEDURE — 36416 COLLJ CAPILLARY BLOOD SPEC: CPT

## 2017-07-05 PROCEDURE — 85610 PROTHROMBIN TIME: CPT | Mod: QW

## 2017-07-05 NOTE — MR AVS SNAPSHOT
Donato De Leon   7/5/2017 8:15 AM   Anticoagulation Therapy Visit    Description:  47 year old male   Provider:  AN ANTI COAG   Department:  An Nurse           INR as of 7/5/2017     Today's INR 2.3      Anticoagulation Summary as of 7/5/2017     INR goal 2.0-3.0   Today's INR 2.3   Full instructions 10 mg on Mon, Wed, Fri; 7.5 mg all other days   Next INR check 7/11/2017    Indications   Deep vein thrombosis (DVT) (H) [I82.409] [I82.409]  Long-term (current) use of anticoagulants [Z79.01] [Z79.01]         Your next Anticoagulation Clinic appointment(s)     Jul 11, 2017  3:45 PM CDT   [facility]   [facility]    [address] [facility] [id number] [phone]              Contact Numbers     [facility]  Please call  [phone] to cancel and/or reschedule your appointment, or with any problems or questions regarding your therapy.        July 2017 Details    Sun Mon Tue Wed Thu Fri Sat           1                 2               3               4               5      10 mg   See details      6      7.5 mg         7      10 mg         8      7.5 mg           9      7.5 mg         10      10 mg         11            12               13               14               15                 16               17               18               19               20               21               22                 23               24               25               26               27               28               29                 30               31                     Date Details   07/05 This INR check       Date of next INR:  7/11/2017         How to take your warfarin dose     To take:  7.5 mg Take 1.5 of the 5 mg tablets.    To take:  10 mg Take 2 of the 5 mg tablets.

## 2017-07-05 NOTE — PROGRESS NOTES
ANTICOAGULATION FOLLOW-UP CLINIC VISIT    Patient Name:  Donato De Leon  Date:  7/5/2017  Contact Type:  Face to Face    SUBJECTIVE:     Patient Findings     Comments Therapeutic. Reported at last visit was having blood in stools. Reports this subsided and no further bleeding. Dose adjusted and recheck in 1 wk. Still on prednisone taper.            OBJECTIVE    INR Protime   Date Value Ref Range Status   07/05/2017 2.3 (A) 0.86 - 1.14 Final       ASSESSMENT / PLAN  INR assessment THER    Recheck INR In: 6 DAYS    INR Location Clinic      Anticoagulation Summary as of 7/5/2017     INR goal 2.0-3.0   Today's INR 2.3   Maintenance plan 10 mg (5 mg x 2) on Mon, Wed, Fri; 7.5 mg (5 mg x 1.5) all other days   Full instructions 10 mg on Mon, Wed, Fri; 7.5 mg all other days   Weekly total 60 mg   Plan last modified Lyudmila Good RN (7/5/2017)   Next INR check 7/11/2017   Target end date     Indications   Deep vein thrombosis (DVT) (H) [I82.409] [I82.409]  Long-term (current) use of anticoagulants [Z79.01] [Z79.01]         Anticoagulation Episode Summary     INR check location     Preferred lab     Send INR reminders to AN ANTICOAG CLINIC    Comments       Anticoagulation Care Providers     Provider Role Specialty Phone number    Carter Skinner PA-C [facility] [phone]            See the Encounter Report to view Anticoagulation Flowsheet and Dosing Calendar (Go to Encounters tab in chart review, and find the Anticoagulation Therapy Visit)    Dosage adjustment made based on physician directed care plan.    Lyudmila Good RN

## 2017-07-10 ENCOUNTER — TRANSFERRED RECORDS (OUTPATIENT)
Dept: HEALTH INFORMATION MANAGEMENT | Facility: CLINIC | Age: 47
End: 2017-07-10

## 2017-07-10 ENCOUNTER — ANTICOAGULATION THERAPY VISIT (OUTPATIENT)
Dept: NURSING | Facility: CLINIC | Age: 47
End: 2017-07-10
Payer: COMMERCIAL

## 2017-07-10 ENCOUNTER — TELEPHONE (OUTPATIENT)
Dept: FAMILY MEDICINE | Facility: CLINIC | Age: 47
End: 2017-07-10

## 2017-07-10 DIAGNOSIS — I82.409 DEEP VEIN THROMBOSIS (DVT) (H): ICD-10-CM

## 2017-07-10 DIAGNOSIS — Z79.01 LONG-TERM (CURRENT) USE OF ANTICOAGULANTS: ICD-10-CM

## 2017-07-10 DIAGNOSIS — I82.402 DEEP VEIN THROMBOSIS (DVT) OF LEFT LOWER EXTREMITY, UNSPECIFIED CHRONICITY, UNSPECIFIED VEIN (H): ICD-10-CM

## 2017-07-10 DIAGNOSIS — K51.919 ULCERATIVE COLITIS WITH COMPLICATION, UNSPECIFIED LOCATION (H): Primary | ICD-10-CM

## 2017-07-10 LAB — INR POINT OF CARE: 2.2 (ref 0.86–1.14)

## 2017-07-10 PROCEDURE — 36416 COLLJ CAPILLARY BLOOD SPEC: CPT

## 2017-07-10 PROCEDURE — 85610 PROTHROMBIN TIME: CPT | Mod: QW

## 2017-07-10 PROCEDURE — 99207 ZZC NO CHARGE NURSE ONLY: CPT

## 2017-07-10 NOTE — PROGRESS NOTES
ANTICOAGULATION FOLLOW-UP CLINIC VISIT    Patient Name:  Donato De Leon  Date:  7/10/2017  Contact Type:  Face to Face    SUBJECTIVE:     Patient Findings     Comments Therapeutic. Continue same dose. Going out of town for work. Recheck in 10 days. Saw GI specialist today. Awaiting lab results before any new treatment for ulcerative colitis. Feels ulcerative colitis is under control at this time. Has rash on left side of neck, thinks is possible poison ivy and was referred to dermatology by provider today. Using benadryl for itching and may try topical cortisone cream OTC.            OBJECTIVE    INR Protime   Date Value Ref Range Status   07/10/2017 2.2 (A) 0.86 - 1.14 Final       ASSESSMENT / PLAN  INR assessment THER    Recheck INR In: 10 DAYS    INR Location Clinic      Anticoagulation Summary as of 7/10/2017     INR goal 2.0-3.0   Today's INR 2.2   Maintenance plan 10 mg (5 mg x 2) on Mon, Wed, Fri; 7.5 mg (5 mg x 1.5) all other days   Full instructions 10 mg on Mon, Wed, Fri; 7.5 mg all other days   Weekly total 60 mg   No change documented Lyudmila Good RN   Plan last modified Lyudmila Good RN (7/5/2017)   Next INR check 7/21/2017   Target end date     Indications   Deep vein thrombosis (DVT) (H) [I82.409] [I82.409]  Long-term (current) use of anticoagulants [Z79.01] [Z79.01]         Anticoagulation Episode Summary     INR check location     Preferred lab     Send INR reminders to AN ANTICOAG CLINIC    Comments       Anticoagulation Care Providers     Provider Role Specialty Phone number    Carter Skinner PA-C [facility] [phone]            See the Encounter Report to view Anticoagulation Flowsheet and Dosing Calendar (Go to Encounters tab in chart review, and find the Anticoagulation Therapy Visit)        Lyudmlia Good RN

## 2017-07-10 NOTE — TELEPHONE ENCOUNTER
On warfarin for 3 months. Patient was told by GI that he may need this long term due to Dx of Ulcerative colitis. He saw GI specialist today and they suggested this be reviewed by hematology.   Could you order hematology referral? And if so should we continue warfarin until seen by hematology?  Thank you. Lyudmila Good RN

## 2017-07-10 NOTE — MR AVS SNAPSHOT
Donato De Leon   7/10/2017 1:45 PM   Anticoagulation Therapy Visit    Description:  47 year old male   Provider:  AN ANTI COAG   Department:  An Nurse           INR as of 7/10/2017     Today's INR 2.2      Anticoagulation Summary as of 7/10/2017     INR goal 2.0-3.0   Today's INR 2.2   Full instructions 10 mg on Mon, Wed, Fri; 7.5 mg all other days   Next INR check 7/21/2017    Indications   Deep vein thrombosis (DVT) (H) [I82.409] [I82.409]  Long-term (current) use of anticoagulants [Z79.01] [Z79.01]         Your next Anticoagulation Clinic appointment(s)     Jul 21, 2017  9:30 AM CDT   Anticoagulation [facility] with AN ANTI COAG   [facility]    [address] [facility] [id number] [phone]              Contact Numbers     [facility]  Please call  [phone] to cancel and/or reschedule your appointment, or with any problems or questions regarding your therapy.        July 2017 Details    Sun Mon Tue Wed Thu Fri Sat           1                 2               3               4               5               6               7               8                 9               10      10 mg   See details      11      7.5 mg         12      10 mg         13      7.5 mg         14      10 mg         15      7.5 mg           16      7.5 mg         17      10 mg         18      7.5 mg         19      10 mg         20      7.5 mg         21            22                 23               24               25               26               27               28               29                 30               31                     Date Details   07/10 This INR check       Date of next INR:  7/21/2017         How to take your warfarin dose     To take:  7.5 mg Take 1.5 of the 5 mg tablets.    To take:  10 mg Take 2 of the 5 mg tablets.

## 2017-07-11 PROBLEM — I82.402 DEEP VEIN THROMBOSIS (DVT) OF LEFT LOWER EXTREMITY, UNSPECIFIED CHRONICITY, UNSPECIFIED VEIN (H): Status: ACTIVE | Noted: 2017-07-11

## 2017-07-11 NOTE — TELEPHONE ENCOUNTER
Spoke with patient.  Given instructions per Carter Skinner PA-C below.  States understanding.  Given phone number to schedule appointment with hematology at center for bleeding/clotting disorders.  He will call for appointment and continue on his warfarin.  Danielle Avitia RN

## 2017-07-14 ENCOUNTER — TRANSFERRED RECORDS (OUTPATIENT)
Dept: HEALTH INFORMATION MANAGEMENT | Facility: CLINIC | Age: 47
End: 2017-07-14

## 2017-07-21 ENCOUNTER — ANTICOAGULATION THERAPY VISIT (OUTPATIENT)
Dept: NURSING | Facility: CLINIC | Age: 47
End: 2017-07-21
Payer: COMMERCIAL

## 2017-07-21 DIAGNOSIS — I82.402 DEEP VEIN THROMBOSIS (DVT) OF LEFT LOWER EXTREMITY, UNSPECIFIED CHRONICITY, UNSPECIFIED VEIN (H): ICD-10-CM

## 2017-07-21 DIAGNOSIS — Z79.01 LONG-TERM (CURRENT) USE OF ANTICOAGULANTS: ICD-10-CM

## 2017-07-21 LAB — INR POINT OF CARE: 3.4 (ref 0.86–1.14)

## 2017-07-21 PROCEDURE — 99207 ZZC NO CHARGE NURSE ONLY: CPT

## 2017-07-21 PROCEDURE — 36416 COLLJ CAPILLARY BLOOD SPEC: CPT

## 2017-07-21 PROCEDURE — 85610 PROTHROMBIN TIME: CPT | Mod: QW

## 2017-07-21 NOTE — MR AVS SNAPSHOT
Donato De Leon   7/21/2017 9:30 AM   Anticoagulation Therapy Visit    Description:  47 year old male   Provider:  AN ANTI COAG   Department:  An Nurse           INR as of 7/21/2017     Today's INR 3.4!      Anticoagulation Summary as of 7/21/2017     INR goal 2.0-3.0   Today's INR 3.4!   Full instructions 7/24: 7.5 mg; Otherwise 10 mg on Wed, Fri; 7.5 mg all other days   Next INR check 8/3/2017    Indications   Deep vein thrombosis (DVT) (H) [I82.409] [I82.409]  Long-term (current) use of anticoagulants [Z79.01] [Z79.01]         Your next Anticoagulation Clinic appointment(s)     Aug 03, 2017  9:45 AM CDT   [facility]    [address] [facility] [facility] [address] [phone]              Contact Numbers     [facility]  Please call  [phone] to cancel and/or reschedule your appointment, or with any problems or questions regarding your therapy.        July 2017 Details    Sun Mon Tue Wed Thu Fri Sat           1                 2               3               4               5               6               7               8                 9               10               11               12               13               14               15                 16               17               18               19               20               21      10 mg   See details      22      7.5 mg           23      7.5 mg         24      7.5 mg         25      7.5 mg         26      10 mg         27      7.5 mg         28      10 mg         29      7.5 mg           30      7.5 mg         31      7.5 mg               Date Details   07/21 This INR check               How to take your warfarin dose     To take:  7.5 mg Take 1.5 of the 5 mg tablets.    To take:  10 mg Take 2 of the 5 mg tablets.           August 2017 Details    Sun Mon Tue Wed Thu Fri Sat       1      7.5 mg         2      10 mg         3            4                5                 6               7               8               9               10               11               12                 13               14               15               16               17               18               19                 20               21               22               23               24               25               26                 27               28               29               30               31                  Date Details   No additional details    Date of next INR:  8/3/2017         How to take your warfarin dose     To take:  7.5 mg Take 1.5 of the 5 mg tablets.    To take:  10 mg Take 2 of the 5 mg tablets.

## 2017-07-21 NOTE — PROGRESS NOTES
ANTICOAGULATION FOLLOW-UP CLINIC VISIT    Patient Name:  Donato De Leon  Date:  7/21/2017  Contact Type:  Face to Face    SUBJECTIVE:     Patient Findings     Positives No Problem Findings           OBJECTIVE    INR Protime   Date Value Ref Range Status   07/21/2017 3.4 (A) 0.86 - 1.14 Final       ASSESSMENT / PLAN  INR assessment SUPRA    Recheck INR In: 2 WEEKS    INR Location Clinic      Anticoagulation Summary as of 7/21/2017     INR goal 2.0-3.0   Today's INR 3.4!   Maintenance plan 10 mg (5 mg x 2) on Wed, Fri; 7.5 mg (5 mg x 1.5) all other days   Full instructions 7/24: 7.5 mg; Otherwise 10 mg on Wed, Fri; 7.5 mg all other days   Weekly total 57.5 mg   Plan last modified Chanelle Fu RN (7/21/2017)   Next INR check 8/3/2017   Target end date     Indications   Deep vein thrombosis (DVT) (H) [I82.409] [I82.409]  Long-term (current) use of anticoagulants [Z79.01] [Z79.01]         Anticoagulation Episode Summary     INR check location     Preferred lab     Send INR reminders to AN ANTICOAG CLINIC    Comments       Anticoagulation Care Providers     Provider Role Specialty Phone number    Brody, Carter Romo PA-C [facility] [phone]            See the Encounter Report to view Anticoagulation Flowsheet and Dosing Calendar (Go to Encounters tab in chart review, and find the Anticoagulation Therapy Visit)        Chanelle Fu RN

## 2017-07-25 ENCOUNTER — OFFICE VISIT (OUTPATIENT)
Dept: HEMATOLOGY | Facility: CLINIC | Age: 47
End: 2017-07-25
Attending: INTERNAL MEDICINE
Payer: COMMERCIAL

## 2017-07-25 VITALS
SYSTOLIC BLOOD PRESSURE: 133 MMHG | WEIGHT: 264.4 LBS | HEART RATE: 85 BPM | OXYGEN SATURATION: 97 % | TEMPERATURE: 98 F | BODY MASS INDEX: 35.04 KG/M2 | HEIGHT: 73 IN | RESPIRATION RATE: 16 BRPM | DIASTOLIC BLOOD PRESSURE: 93 MMHG

## 2017-07-25 DIAGNOSIS — I82.402 DEEP VEIN THROMBOSIS (DVT) OF LEFT LOWER EXTREMITY, UNSPECIFIED CHRONICITY, UNSPECIFIED VEIN (H): ICD-10-CM

## 2017-07-25 DIAGNOSIS — K51.919 ULCERATIVE COLITIS WITH COMPLICATION, UNSPECIFIED LOCATION (H): ICD-10-CM

## 2017-07-25 PROCEDURE — 99204 OFFICE O/P NEW MOD 45 MIN: CPT | Mod: 25 | Performed by: INTERNAL MEDICINE

## 2017-07-25 PROCEDURE — 99212 OFFICE O/P EST SF 10 MIN: CPT

## 2017-07-25 ASSESSMENT — PAIN SCALES - GENERAL: PAINLEVEL: NO PAIN (0)

## 2017-07-25 NOTE — LETTER
2017       RE: Donato De Leon  [address] [place] [address]     Dear Colleague,    Thank you for referring your patient, Donato De Leon, to the [facility] at [facility]. Please see a copy of my visit note below.      [facility]  [address] [place] [address], [facility] [address], [place] [address]  Phone: [phone], Fax: [phone].    Outpatient Visit Note:    Patient: Donato De Leon  MRN: [id number]  : 1970  SEBASTIAN: 2017    Reason for Consultation:  Distal DVT     History of Present Illness:  Donato De Leon  is a 47 year old  Past medical history remarkable for ulcerative colitis with frequent flares who was diagnosed with a distal DVT in the setting of severe flare of ulcerative colitis. He is being referred to determine the duration of anticoagulation and plan going forward. As per the patient in second week of February is started feeling charley horses sensation in his left leg, he did not pay much attention to it as he was dealing with his ulcerative colitis flareup and was on steroids. He had his follow-up annual exam and of March where he mentioned this to his primary provider on a passing reference, he underwent an ultrasound of his lower extremity which was remarkable for extensive thrombosis and distal popliteal veins in calf - no proximal DVT. He was started on Lovenox and bridged to Coumadin and he has been taking Coumadin since. He has had recent INR control with majority of the numbers being between 2 and 3.   He has not noticed any bleeding complications despite struggling through the flare of ulcerative colitis- he does mention that in the initial day or 2 he had some blood when he initiated Coumadin and was on Lovenox but has that has since resolved.     He has established care with a new gastroenterologist and will be starting with biologic therapy to manage his ulcerative  colitis        Past Medical History:  Past Medical History:   Diagnosis Date     Colitis, ulcerative (H)      Renal lithiasis        Past Surgical History:  Past Surgical History:   Procedure Laterality Date     HC FRAGMENTING OF KIDNEY STONE       SKIN CANCER SCREENING      basal cell        Medications:  Current Outpatient Prescriptions   Medication Sig Dispense Refill     Acetaminophen (TYLENOL PO) Take by mouth as needed for mild pain or fever       warfarin (COUMADIN) 5 MG tablet Daily as directed. Start at 7.5mg daily (Patient taking differently: Daily as directed. Start at 7.5mg 5 days per week and 10mg on Wed&Fri, or as directed by [facility] coumadin clinic.) 45 tablet 2        Allergies:  Allergies   Allergen Reactions     Nkda [No Known Drug Allergies]        ROS:  Denies any bleeding issues. No gum bleeding, No nose bleed. Denies any hematuria or blood in stools. Denies any ecchymosis. Denies any lower extremities swelling or pain. Denies any fever, no chest pain. Denies any shortness of breath.     Social History:  Denies any tobacco use. No significant alcohol use. Denies any illicit drug use. Patient works as a banker     Family History:  Family History   Problem Relation Age of Onset     CLOTTING DISORDER Sister          Objectives:  Pleasant 47 year old male in no acute distress.  Vitals: B/P: 133/93[provider notified[, T: 98, P: 85, R: 16, Wt: 264 lbs 6.4 oz  Exam:   Gen: Appears well, no distress  HEENT: No scleral icterus or hemorrahge, no wet purpura, no lymphadenopathy  Abd: soft, nontender,   Ext: no edema  MSK: No difference between the two lower extremities     Labs:  Results for TRISH LACEY (MRN [id number]) as of 7/28/2017 13:26   Ref. Range 4/6/2017 00:00 4/10/2017 00:00 4/19/2017 00:00 4/26/2017 00:00 5/3/2017 00:00 5/10/2017 00:00 5/19/2017 00:00 6/9/2017 00:00 6/15/2017 00:00 6/23/2017 00:00 6/29/2017 00:00 7/5/2017 00:00 7/10/2017 00:00 7/21/2017 00:00   INR Latest Ref Range:  0.86 - 1.14  2.7 (A) 4.7 (A) 1.1 3.7 (A) 2.5 (A) 2.4 (A) 2.5 (A) 1.8 (A) 1.2 (A) 1.4 (A) 2.8 (A) 2.3 (A) 2.2 (A) 3.4 (A)       Imaging:  Extensive thrombus in the distal popliteal veins  throughout the calf veins.      [Critical Result: Extensive left leg DVT]    Assessment:  In summary, Donato De Leon is a 47 year old pleasant man with a medical history remarkable for provoked distal DVT( distal popliteal vein) in his left leg in the setting of acute flareup of ulcerative colitis and steroid therapy. He has taken treatment for 3 months already. Which would be usual recommendation. We discussed that the clot resolves over time and anticoagulants just help with stopping the propagation of the clot. At this point one can either stop anticoagulation completely or we could do an ultrasound and if there is significant residual clot I can give them a call and we can have a conversation regarding stopping versus considering that chronic clot.   We did discuss high-risk situations for thrombosis and how once he stop anticoagulation he would need to be on aggressive prophylaxis in those situations which could even be accomplished by one of the newer oral anticoagulant drugs.   I have ordered an ultrasound and will follow up with him on phone    I spent 55 minutes in the care of this patient >50% of which was spent in coordinating and counseling.      Tasha Garvin   of Medicine   Hematology and medical Oncology   [facility]      I called the patient regarding the results of the ultrasound to formulate a therapy plan -unfortunately he was not available and I have left a voice message , he will call the Center and give us a time that I can call to come up with a therapy plan for him .     Tasha Garvin   of Medicine   Hematology and medical Oncology   [facility]

## 2017-07-25 NOTE — MR AVS SNAPSHOT
After Visit Summary   7/25/2017    Donato De Leon    MRN: [id number]           Patient Information     Date Of Birth          1970        Visit Information        Provider Department      7/25/2017 3:00 PM Tasha Garvin MD [facility] Bleeding [facility] Clot[facility]        Today's Diagnoses     Ulcerative colitis with complication, unspecified location (H)        Deep vein thrombosis (DVT) of left lower extremity, unspecified chronicity, unspecified vein (H)           Follow-ups after your visit        Your next 10 appointments already scheduled     Aug 03, 2017  9:45 AM CDT   Anticoagulation Visit with AN ANTI COAG   [facility]    [address] [facility] [address] [phone]              Who to contact     If you have questions or need follow up information about today's clinic visit or your schedule please contact [facility] BLE[facility] AND CLOTT[facility] directly at [phone].  Normal or non-critical lab and imaging results will be communicated to you by Moov cc.hart, letter or phone within 4 business days after the clinic has received the results. If you do not hear from us within 7 days, please contact the clinic through Moov cc.hart or phone. If you have a critical or abnormal lab result, we will notify you by phone as soon as possible.  Submit refill requests through [employer] or call your pharmacy and they will forward the refill request to us. Please allow 3 business days for your refill to be completed.          Additional Information About Your Visit        MyChart Information     [employer] gives you secure access to your electronic health record. If you see a primary care provider, you can also send messages to your care team and make appointments. If you have questions, please call your primary care clinic.  If you do not have a primary care provider, please call [phone] and they will assist you.        Care EveryWhere ID     This is your Care EveryWhere ID.  "This could be used by other organizations to access your [place] medical records  [id number]        Your Vitals Were     Pulse Temperature Respirations Height Pulse Oximetry BMI (Body Mass Index)    85 98  F (36.7  C) (Oral) 16 1.854 m (6' 0.99\") 97% 34.89 kg/m2       Blood Pressure from Last 3 Encounters:   07/25/17 (!) 133/93   05/03/17 120/88   03/29/17 131/83    Weight from Last 3 Encounters:   07/25/17 119.9 kg (264 lb 6.4 oz)   03/29/17 112.5 kg (248 lb)   04/19/16 114.8 kg (253 lb)                 Today's Medication Changes          These changes are accurate as of: 7/25/17 11:59 PM.  If you have any questions, ask your nurse or doctor.               These medicines have changed or have updated prescriptions.        Dose/Directions    warfarin 5 MG tablet   Commonly known as:  COUMADIN   This may have changed:  additional instructions   Used for:  Deep vein thrombosis (DVT) (H)        Daily as directed. Start at 7.5mg daily   Quantity:  45 tablet   Refills:  2                Primary Care Provider Office Phone # Fax #    Carter Skinner PA-C [phone]       [facility] [id number] [place] [address]        Equal Access to Services     МАРИНА DUMONT AH: Hadii saba garibay hadasho Soomaali, waaxda luqadaha, qaybta kaalmada adeegyada, jorge pennington. So [facility] [phone].    ATENCIÓN: Si habla español, tiene a barnes disposición servicios gratuitos de asistencia lingüística. Llame al [phone].    We comply with applicable federal civil rights laws and [place] laws. We do not discriminate on the basis of race, color, national origin, age, disability sex, sexual orientation or gender identity.            Thank you!     Thank you for choosing [facility] BLEED[facility] AND CLOTTING  for your care. Our goal is always to provide you with excellent care. Hearing back from our patients is one way we can continue to improve our services. Please take a few minutes to " complete the written survey that you may receive in the mail after your visit with us. Thank you!             Your Updated Medication List - Protect others around you: Learn how to safely use, store and throw away your medicines at www.disposemymeds.org.          This list is accurate as of: 7/25/17 11:59 PM.  Always use your most recent med list.                   Brand Name Dispense Instructions for use Diagnosis    TYLENOL PO      Take by mouth as needed for mild pain or fever        warfarin 5 MG tablet    COUMADIN    45 tablet    Daily as directed. Start at 7.5mg daily    Deep vein thrombosis (DVT) (H)

## 2017-07-25 NOTE — PROGRESS NOTES
[facility]  [address] [place] [address] [address], [place] [address]  Phone: [phone], Fax: [phone].    Outpatient Visit Note:    Patient: Donato De Leon  MRN: [id number]  : 1970  SEBASTIAN: 2017    Reason for Consultation:  Distal DVT     History of Present Illness:  Donato De Leon  is a 47 year old  Past medical history remarkable for ulcerative colitis with frequent flares who was diagnosed with a distal DVT in the setting of severe flare of ulcerative colitis. He is being referred to determine the duration of anticoagulation and plan going forward. As per the patient in second week of February is started feeling charley horses sensation in his left leg, he did not pay much attention to it as he was dealing with his ulcerative colitis flareup and was on steroids. He had his follow-up annual exam and of March where he mentioned this to his primary provider on a passing reference, he underwent an ultrasound of his lower extremity which was remarkable for extensive thrombosis and distal popliteal veins in calf - no proximal DVT. He was started on Lovenox and bridged to Coumadin and he has been taking Coumadin since. He has had recent INR control with majority of the numbers being between 2 and 3.   He has not noticed any bleeding complications despite struggling through the flare of ulcerative colitis- he does mention that in the initial day or 2 he had some blood when he initiated Coumadin and was on Lovenox but has that has since resolved.     He has established care with a new gastroenterologist and will be starting with biologic therapy to manage his ulcerative colitis        Past Medical History:  Past Medical History:   Diagnosis Date     Colitis, ulcerative (H)      Renal lithiasis        Past Surgical History:  Past Surgical History:   Procedure Laterality Date     HC FRAGMENTING OF KIDNEY STONE       SKIN CANCER SCREENING      basal cell         Medications:  Current Outpatient Prescriptions   Medication Sig Dispense Refill     Acetaminophen (TYLENOL PO) Take by mouth as needed for mild pain or fever       warfarin (COUMADIN) 5 MG tablet Daily as directed. Start at 7.5mg daily (Patient taking differently: Daily as directed. Start at 7.5mg 5 days per week and 10mg on Wed&Fri, or as directed by [facility] coumadin clinic.) 45 tablet 2        Allergies:  Allergies   Allergen Reactions     Nkda [No Known Drug Allergies]        ROS:  Denies any bleeding issues. No gum bleeding, No nose bleed. Denies any hematuria or blood in stools. Denies any ecchymosis. Denies any lower extremities swelling or pain. Denies any fever, no chest pain. Denies any shortness of breath.     Social History:  Denies any tobacco use. No significant alcohol use. Denies any illicit drug use. Patient works as a banker     Family History:  Family History   Problem Relation Age of Onset     CLOTTING DISORDER Sister          Objectives:  Pleasant 47 year old male in no acute distress.  Vitals: B/P: 133/93[provider notified[, T: 98, P: 85, R: 16, Wt: 264 lbs 6.4 oz  Exam:   Gen: Appears well, no distress  HEENT: No scleral icterus or hemorrahge, no wet purpura, no lymphadenopathy  Abd: soft, nontender,   Ext: no edema  MSK: No difference between the two lower extremities     Labs:  Results for TRISH LACEY (MRN [id number]) as of 7/28/2017 13:26   Ref. Range 4/6/2017 00:00 4/10/2017 00:00 4/19/2017 00:00 4/26/2017 00:00 5/3/2017 00:00 5/10/2017 00:00 5/19/2017 00:00 6/9/2017 00:00 6/15/2017 00:00 6/23/2017 00:00 6/29/2017 00:00 7/5/2017 00:00 7/10/2017 00:00 7/21/2017 00:00   INR Latest Ref Range: 0.86 - 1.14  2.7 (A) 4.7 (A) 1.1 3.7 (A) 2.5 (A) 2.4 (A) 2.5 (A) 1.8 (A) 1.2 (A) 1.4 (A) 2.8 (A) 2.3 (A) 2.2 (A) 3.4 (A)       Imaging:  Extensive thrombus in the distal popliteal veins  throughout the calf veins.      [Critical Result: Extensive left leg DVT]    Assessment:  In summary,  Donato De Leon is a 47 year old pleasant man with a medical history remarkable for provoked distal DVT( distal popliteal vein) in his left leg in the setting of acute flareup of ulcerative colitis and steroid therapy. He has taken treatment for 3 months already. Which would be usual recommendation. We discussed that the clot resolves over time and anticoagulants just help with stopping the propagation of the clot. At this point one can either stop anticoagulation completely or we could do an ultrasound and if there is significant residual clot I can give them a call and we can have a conversation regarding stopping versus considering that chronic clot.   We did discuss high-risk situations for thrombosis and how once he stop anticoagulation he would need to be on aggressive prophylaxis in those situations which could even be accomplished by one of the newer oral anticoagulant drugs.   I have ordered an ultrasound and will follow up with him on phone    I spent 55 minutes in the care of this patient >50% of which was spent in coordinating and counseling.      Tasha Garvin   of Medicine   Hematology and medical Oncology   [facility]      I called the patient regarding the results of the ultrasound to formulate a therapy plan -unfortunately he was not available and I have left a voice message , he will call the Center and give us a time that I can call to come up with a therapy plan for him .     Tasha Garvin   of Medicine   Hematology and medical Oncology   [facility]

## 2017-07-25 NOTE — LETTER
Date:July 26, 2017      Provider requested that no letter be sent. Do not send.       [facility] Health Information

## 2017-07-25 NOTE — LETTER
2017         RE: Donato De Leon  [address] [place] [address]        Dear Colleague,    Thank you for referring your patient, Donato De Leon, to the [facility] BLEEDING AND CLOTT[facility]. Please see a copy of my visit note below.              [facility]  [address] [place] [address] [address], [place] [address]  Phone: [phone], Fax: [phone].    Outpatient Visit Note:    Patient: Donato De Leon  MRN: [id number]  : 1970  SEBASTIAN: 2017    Reason for Consultation:  Distal DVT     History of Present Illness:  Donato De Leon  is a 47 year old  Past medical history remarkable for ulcerative colitis with frequent flares who was diagnosed with a distal DVT in the setting of severe flare of ulcerative colitis. He is being referred to determine the duration of anticoagulation and plan going forward. As per the patient in second week of February is started feeling charley horses sensation in his left leg, he did not pay much attention to it as he was dealing with his ulcerative colitis flareup and was on steroids. He had his follow-up annual exam and of March where he mentioned this to his primary provider on a passing reference, he underwent an ultrasound of his lower extremity which was remarkable for extensive thrombosis and distal popliteal veins in calf - no proximal DVT. He was started on Lovenox and bridged to Coumadin and he has been taking Coumadin since. He has had recent INR control with majority of the numbers being between 2 and 3.   He has not noticed any bleeding complications despite struggling through the flare of ulcerative colitis- he does mention that in the initial day or 2 he had some blood when he initiated Coumadin and was on Lovenox but has that has since resolved.     He has established care with a new gastroenterologist and will be starting with biologic therapy to manage his ulcerative colitis        Past Medical  History:  Past Medical History:   Diagnosis Date     Colitis, ulcerative (H)      Renal lithiasis        Past Surgical History:  Past Surgical History:   Procedure Laterality Date     HC FRAGMENTING OF KIDNEY STONE       SKIN CANCER SCREENING      basal cell        Medications:  Current Outpatient Prescriptions   Medication Sig Dispense Refill     Acetaminophen (TYLENOL PO) Take by mouth as needed for mild pain or fever       warfarin (COUMADIN) 5 MG tablet Daily as directed. Start at 7.5mg daily (Patient taking differently: Daily as directed. Start at 7.5mg 5 days per week and 10mg on Wed&Fri, or as directed by [facility] coumadin clinic.) 45 tablet 2        Allergies:  Allergies   Allergen Reactions     Nkda [No Known Drug Allergies]        ROS:  Denies any bleeding issues. No gum bleeding, No nose bleed. Denies any hematuria or blood in stools. Denies any ecchymosis. Denies any lower extremities swelling or pain. Denies any fever, no chest pain. Denies any shortness of breath.     Social History:  Denies any tobacco use. No significant alcohol use. Denies any illicit drug use. Patient works as a banker     Family History:  Family History   Problem Relation Age of Onset     CLOTTING DISORDER Sister          Objectives:  Pleasant 47 year old male in no acute distress.  Vitals: B/P: 133/93[provider notified[, T: 98, P: 85, R: 16, Wt: 264 lbs 6.4 oz  Exam:   Gen: Appears well, no distress  HEENT: No scleral icterus or hemorrahge, no wet purpura, no lymphadenopathy  Abd: soft, nontender,   Ext: no edema  MSK: No difference between the two lower extremities     Labs:  Results for TRISH LACEY (MRN [id number]) as of 7/28/2017 13:26   Ref. Range 4/6/2017 00:00 4/10/2017 00:00 4/19/2017 00:00 4/26/2017 00:00 5/3/2017 00:00 5/10/2017 00:00 5/19/2017 00:00 6/9/2017 00:00 6/15/2017 00:00 6/23/2017 00:00 6/29/2017 00:00 7/5/2017 00:00 7/10/2017 00:00 7/21/2017 00:00   INR Latest Ref Range: 0.86 - 1.14  2.7 (A) 4.7 (A)  1.1 3.7 (A) 2.5 (A) 2.4 (A) 2.5 (A) 1.8 (A) 1.2 (A) 1.4 (A) 2.8 (A) 2.3 (A) 2.2 (A) 3.4 (A)       Imaging:  Extensive thrombus in the distal popliteal veins  throughout the calf veins.      [Critical Result: Extensive left leg DVT]    Assessment:  In summary, Donato De Leon is a 47 year old pleasant man with a medical history remarkable for provoked distal DVT( distal popliteal vein) in his left leg in the setting of acute flareup of ulcerative colitis and steroid therapy. He has taken treatment for 3 months already. Which would be usual recommendation. We discussed that the clot resolves over time and anticoagulants just help with stopping the propagation of the clot. At this point one can either stop anticoagulation completely or we could do an ultrasound and if there is significant residual clot I can give them a call and we can have a conversation regarding stopping versus considering that chronic clot.   We did discuss high-risk situations for thrombosis and how once he stop anticoagulation he would need to be on aggressive prophylaxis in those situations which could even be accomplished by one of the newer oral anticoagulant drugs.   I have ordered an ultrasound and will follow up with him on phone    I spent 55 minutes in the care of this patient >50% of which was spent in coordinating and counseling.      Tasha Garvin   of Medicine   Hematology and medical Oncology   [facility]      I called the patient regarding the results of the ultrasound to formulate a therapy plan -unfortunately he was not available and I have left a voice message , he will call the Center and give us a time that I can call to come up with a therapy plan for him .     Tasha Garvin   of Medicine   Hematology and medical Oncology   [facility]

## 2017-07-27 DIAGNOSIS — I82.409 DEEP VEIN THROMBOSIS (DVT) (H): ICD-10-CM

## 2017-07-31 RX ORDER — WARFARIN SODIUM 5 MG/1
TABLET ORAL
Qty: 45 TABLET | Refills: 2 | Status: SHIPPED | OUTPATIENT
Start: 2017-07-31 | End: 2017-10-24

## 2017-08-17 ENCOUNTER — TELEPHONE (OUTPATIENT)
Dept: HEMATOLOGY | Facility: CLINIC | Age: 47
End: 2017-08-17

## 2017-08-17 ENCOUNTER — ANTICOAGULATION THERAPY VISIT (OUTPATIENT)
Dept: NURSING | Facility: CLINIC | Age: 47
End: 2017-08-17
Payer: COMMERCIAL

## 2017-08-17 DIAGNOSIS — I82.402 DEEP VEIN THROMBOSIS (DVT) OF LEFT LOWER EXTREMITY, UNSPECIFIED CHRONICITY, UNSPECIFIED VEIN (H): ICD-10-CM

## 2017-08-17 DIAGNOSIS — Z79.01 LONG-TERM (CURRENT) USE OF ANTICOAGULANTS: ICD-10-CM

## 2017-08-17 LAB — INR POINT OF CARE: 1.5 (ref 0.86–1.14)

## 2017-08-17 PROCEDURE — 99207 ZZC NO CHARGE NURSE ONLY: CPT

## 2017-08-17 PROCEDURE — 85610 PROTHROMBIN TIME: CPT | Mod: QW

## 2017-08-17 PROCEDURE — 36416 COLLJ CAPILLARY BLOOD SPEC: CPT

## 2017-08-17 NOTE — MR AVS SNAPSHOT
Donato De Leon   8/17/2017 7:30 AM   Anticoagulation Therapy Visit    Description:  47 year old male   Provider:  AN ANTI COAG   Department:  An Nurse           INR as of 8/17/2017     Today's INR 1.5!      Anticoagulation Summary as of 8/17/2017     INR goal 2.0-3.0   Today's INR 1.5!   Full instructions 7.5 mg every day   Next INR check 8/31/2017    Indications   Deep vein thrombosis (DVT) (H) [I82.409] [I82.409]  Long-term (current) use of anticoagulants [Z79.01] [Z79.01]         Your next Anticoagulation Clinic appointment(s)     Aug 31, 2017  8:45 AM CDT   Anticoagulation Visit with AN ANTI COAG   [facility]    [address] [facility] [address] [phone]              Contact Numbers     [facility]  Please call  [phone] to cancel and/or reschedule your appointment, or with any problems or questions regarding your therapy.        August 2017 Details    Sun Mon Tue Wed Thu Fri Sat       1               2               3               4               5                 6               7               8               9               10               11               12                 13               14               15               16               17      7.5 mg   See details      18      7.5 mg         19      7.5 mg           20      7.5 mg         21      7.5 mg         22      7.5 mg         23      7.5 mg         24      7.5 mg         25      7.5 mg         26      7.5 mg           27      7.5 mg         28      7.5 mg         29      7.5 mg         30      7.5 mg         31               Date Details   08/17 This INR check       Date of next INR:  8/31/2017         How to take your warfarin dose     To take:  7.5 mg Take 1.5 of the 5 mg tablets.

## 2017-08-17 NOTE — PROGRESS NOTES
ANTICOAGULATION FOLLOW-UP CLINIC VISIT    Patient Name:  Donato De Leon  Date:  8/17/2017  Contact Type:  Face to Face    SUBJECTIVE:     Patient Findings     Positives Med error    Comments INR 1.5. Patient took warfarin different than instructed. Took lower dose. Took 5 mg instead of 7.5 mg on 5 days a wk. States I must have misunderstood directions. Will adjust dose and recheck in 2 wks. Has seen hematology and had U/S. He is having an infusion treatment today by GI for his ulcerative colitis.            OBJECTIVE    INR Protime   Date Value Ref Range Status   08/17/2017 1.5 (A) 0.86 - 1.14 Final       ASSESSMENT / PLAN  INR assessment SUB    Recheck INR In: 2 WEEKS    INR Location Clinic      Anticoagulation Summary as of 8/17/2017     INR goal 2.0-3.0   Today's INR 1.5!   Maintenance plan 10 mg (5 mg x 2) on Wed, Fri; 7.5 mg (5 mg x 1.5) all other days   Full instructions 10 mg on Wed, Fri; 7.5 mg all other days   Weekly total 57.5 mg   Plan last modified Chanelle Fu RN (7/21/2017)   Next INR check 8/31/2017   Target end date     Indications   Deep vein thrombosis (DVT) (H) [I82.409] [I82.409]  Long-term (current) use of anticoagulants [Z79.01] [Z79.01]         Anticoagulation Episode Summary     INR check location     Preferred lab     Send INR reminders to AN ANTICOAG CLINIC    Comments       Anticoagulation Care Providers     Provider Role Specialty Phone number    Brody, Carter Romo PA-C [facility] [phone]            See the Encounter Report to view Anticoagulation Flowsheet and Dosing Calendar (Go to Encounters tab in chart review, and find the Anticoagulation Therapy Visit)        Lyudmila Good RN

## 2017-09-19 ENCOUNTER — TELEPHONE (OUTPATIENT)
Dept: FAMILY MEDICINE | Facility: CLINIC | Age: 47
End: 2017-09-19

## 2017-09-19 NOTE — TELEPHONE ENCOUNTER
Patient overdue for INR check. Left message on voicemail for patient to call me back or schedule. Lyudmila Good RN

## 2017-09-27 ENCOUNTER — ANTICOAGULATION THERAPY VISIT (OUTPATIENT)
Dept: NURSING | Facility: CLINIC | Age: 47
End: 2017-09-27
Payer: COMMERCIAL

## 2017-09-27 DIAGNOSIS — Z79.01 LONG-TERM (CURRENT) USE OF ANTICOAGULANTS: ICD-10-CM

## 2017-09-27 DIAGNOSIS — I82.402 DEEP VEIN THROMBOSIS (DVT) OF LEFT LOWER EXTREMITY, UNSPECIFIED CHRONICITY, UNSPECIFIED VEIN (H): ICD-10-CM

## 2017-09-27 LAB — INR POINT OF CARE: 1.8 (ref 0.86–1.14)

## 2017-09-27 PROCEDURE — 85610 PROTHROMBIN TIME: CPT | Mod: QW

## 2017-09-27 PROCEDURE — 36416 COLLJ CAPILLARY BLOOD SPEC: CPT

## 2017-09-27 PROCEDURE — 99207 ZZC NO CHARGE NURSE ONLY: CPT

## 2017-09-27 NOTE — MR AVS SNAPSHOT
Donato De Leon   9/27/2017 8:00 AM   Anticoagulation Therapy Visit    Description:  47 year old male   Provider:  AN ANTI COAG   Department:  An Nurse           INR as of 9/27/2017     Today's INR 1.8!      Anticoagulation Summary as of 9/27/2017     INR goal 2.0-3.0   Today's INR 1.8!   Full instructions 10 mg on Wed; 7.5 mg all other days   Next INR check 10/12/2017    Indications   Deep vein thrombosis (DVT) (H) [I82.409] [I82.409]  Long-term (current) use of anticoagulants [Z79.01] [Z79.01]         Your next Anticoagulation Clinic appointment(s)     Oct 12, 2017  7:45 AM CDT   [facility] with AN ANTI [facility]AG   [facility]    [address] [facility] [address] [phone]              Contact Numbers     [facility]  Please call  [phone] to cancel and/or reschedule your appointment, or with any problems or questions regarding your therapy.        September 2017 Details    Sun Mon Tue Wed Thu Fri Sat          1               2                 3               4               5               6               7               8               9                 10               11               12               13               14               15               16                 17               18               19               20               21               22               23                 24               25               26               27      10 mg   See details      28      7.5 mg         29      7.5 mg         30      7.5 mg          Date Details   09/27 This INR check               How to take your warfarin dose     To take:  7.5 mg Take 1.5 of the 5 mg tablets.    To take:  10 mg Take 2 of the 5 mg tablets.           October 2017 Details    Sun Mon Tue Wed Thu Fri Sat     1      7.5 mg         2      7.5 mg         3      7.5 mg         4      10 mg         5      7.5 mg         6      7.5 mg         7      7.5 mg           8       7.5 mg         9      7.5 mg         10      7.5 mg         11      10 mg         12            13               14                 15               16               17               18               19               20               21                 22               23               24               25               26               27               28                 29               30               31                    Date Details   No additional details    Date of next INR:  10/12/2017         How to take your warfarin dose     To take:  7.5 mg Take 1.5 of the 5 mg tablets.    To take:  10 mg Take 2 of the 5 mg tablets.

## 2017-09-27 NOTE — PROGRESS NOTES
ANTICOAGULATION FOLLOW-UP CLINIC VISIT    Patient Name:  Donato De Leon  Date:  9/27/2017  Contact Type:  Face to Face    SUBJECTIVE:     Patient Findings     Comments INR 1.8. Denies missed dose. States has been busy at work. Last INR was 6 wks ago. Getting remacaid infusions for colitis. Reports colitis is improving. Weekly dose adjusted and recheck in 2 wks.            OBJECTIVE    INR Protime   Date Value Ref Range Status   09/27/2017 1.8 (A) 0.86 - 1.14 Final       ASSESSMENT / PLAN  INR assessment SUB    Recheck INR In: 2 WEEKS    INR Location Clinic      Anticoagulation Summary as of 9/27/2017     INR goal 2.0-3.0   Today's INR 1.8!   Maintenance plan 10 mg (5 mg x 2) on Wed; 7.5 mg (5 mg x 1.5) all other days   Full instructions 10 mg on Wed; 7.5 mg all other days   Weekly total 55 mg   Plan last modified Lyudmila Good RN (9/27/2017)   Next INR check 10/12/2017   Target end date     Indications   Deep vein thrombosis (DVT) (H) [I82.409] [I82.409]  Long-term (current) use of anticoagulants [Z79.01] [Z79.01]         Anticoagulation Episode Summary     INR check location     Preferred lab     Send INR reminders to AN ANTICOAG CLINIC    Comments       Anticoagulation Care Providers     Provider Role Specialty Phone number    Carter Skinner PA-C [facility] [phone]            See the Encounter Report to view Anticoagulation Flowsheet and Dosing Calendar (Go to Encounters tab in chart review, and find the Anticoagulation Therapy Visit)    Dosage adjustment made based on physician directed care plan.    Lyudmila Good RN

## 2017-10-02 ENCOUNTER — TRANSFERRED RECORDS (OUTPATIENT)
Dept: HEALTH INFORMATION MANAGEMENT | Facility: CLINIC | Age: 47
End: 2017-10-02

## 2017-10-12 ENCOUNTER — ANTICOAGULATION THERAPY VISIT (OUTPATIENT)
Dept: NURSING | Facility: CLINIC | Age: 47
End: 2017-10-12
Payer: COMMERCIAL

## 2017-10-12 DIAGNOSIS — I82.402 DEEP VEIN THROMBOSIS (DVT) OF LEFT LOWER EXTREMITY, UNSPECIFIED CHRONICITY, UNSPECIFIED VEIN (H): ICD-10-CM

## 2017-10-12 DIAGNOSIS — Z79.01 LONG-TERM (CURRENT) USE OF ANTICOAGULANTS: ICD-10-CM

## 2017-10-12 LAB — INR POINT OF CARE: 1.5 (ref 0.86–1.14)

## 2017-10-12 PROCEDURE — 85610 PROTHROMBIN TIME: CPT | Mod: QW

## 2017-10-12 PROCEDURE — 36416 COLLJ CAPILLARY BLOOD SPEC: CPT

## 2017-10-12 PROCEDURE — 99207 ZZC NO CHARGE NURSE ONLY: CPT

## 2017-10-12 NOTE — MR AVS SNAPSHOT
Donato De Leon   10/12/2017 7:45 AM   Anticoagulation Therapy Visit    Description:  47 year old male   Provider:  AN ANTI COAG   Department:  An Nurse           INR as of 10/12/2017     Today's INR 1.5!      Anticoagulation Summary as of 10/12/2017     INR goal 2.0-3.0   Today's INR 1.5!   Full instructions 10/12: 10 mg; 10/13: 10 mg; 10/16: 10 mg; 10/19: 10 mg; 10/20: 10 mg; 10/23: 10 mg; Otherwise 10 mg on Wed; 7.5 mg all other days   Next INR check 10/26/2017    Indications   Deep vein thrombosis (DVT) (H) [I82.409] [I82.409]  Long-term (current) use of anticoagulants [Z79.01] [Z79.01]         Your next Anticoagulation Clinic appointment(s)     Oct 26, 2017  8:00 AM CDT   Anticoagulation Visit with AN ANTI COAG   [facility]    [address] [facility] [address] [phone]              Contact Numbers     [facility]  Please call  [phone] to cancel and/or reschedule your appointment, or with any problems or questions regarding your therapy.        October 2017 Details    Sun Mon Tue Wed Thu Fri Sat     1               2               3               4               5               6               7                 8               9               10               11               12      10 mg   See details      13      10 mg         14      7.5 mg           15      7.5 mg         16      10 mg         17      7.5 mg         18      10 mg         19      10 mg         20      10 mg         21      7.5 mg           22      7.5 mg         23      10 mg         24      7.5 mg         25      10 mg         26            27               28                 29               30               31                    Date Details   10/12 This INR check       Date of next INR:  10/26/2017         How to take your warfarin dose     To take:  7.5 mg Take 1.5 of the 5 mg tablets.    To take:  10 mg Take 2 of the 5 mg tablets.

## 2017-10-12 NOTE — PROGRESS NOTES
ANTICOAGULATION FOLLOW-UP CLINIC VISIT    Patient Name:  Donato De Leon  Date:  10/12/2017  Contact Type:  Face to Face    SUBJECTIVE:     Patient Findings     Positives Unexplained INR or factor level change    Comments INR dropped to 1.5 from 1.8 with dose increase. No missed dose or change in diet or medication. Feeling good and bowel inflammation now under control with infusions. No bleeding or bruising. Increase weekly dose.            OBJECTIVE    INR Protime   Date Value Ref Range Status   10/12/2017 1.5 (A) 0.86 - 1.14 Final       ASSESSMENT / PLAN  No question data found.  Anticoagulation Summary as of 10/12/2017     INR goal 2.0-3.0   Today's INR 1.5!   Maintenance plan 10 mg (5 mg x 2) on Wed; 7.5 mg (5 mg x 1.5) all other days   Full instructions 10/12: 10 mg; 10/13: 10 mg; 10/16: 10 mg; 10/19: 10 mg; 10/20: 10 mg; 10/23: 10 mg; Otherwise 10 mg on Wed; 7.5 mg all other days   Weekly total 55 mg   Plan last modified Lyudmila Good RN (9/27/2017)   Next INR check 10/26/2017   Target end date     Indications   Deep vein thrombosis (DVT) (H) [I82.409] [I82.409]  Long-term (current) use of anticoagulants [Z79.01] [Z79.01]         Anticoagulation Episode Summary     INR check location     Preferred lab     Send INR reminders to AN ANTICOAG CLINIC    Comments       Anticoagulation Care Providers     Provider Role Specialty Phone number    Carter Skinner PA-C [facility] [phone]            See the Encounter Report to view Anticoagulation Flowsheet and Dosing Calendar (Go to Encounters tab in chart review, and find the Anticoagulation Therapy Visit)    Dosage adjustment made based on physician directed care plan.    Lyudmila Good RN

## 2017-10-24 DIAGNOSIS — I82.409 DEEP VEIN THROMBOSIS (DVT) (H): ICD-10-CM

## 2017-10-24 RX ORDER — WARFARIN SODIUM 5 MG/1
TABLET ORAL
Qty: 100 TABLET | Refills: 1 | Status: SHIPPED | OUTPATIENT
Start: 2017-10-24 | End: 2017-11-15

## 2017-10-26 ENCOUNTER — ANTICOAGULATION THERAPY VISIT (OUTPATIENT)
Dept: NURSING | Facility: CLINIC | Age: 47
End: 2017-10-26
Payer: COMMERCIAL

## 2017-10-26 DIAGNOSIS — Z79.01 LONG-TERM (CURRENT) USE OF ANTICOAGULANTS: ICD-10-CM

## 2017-10-26 DIAGNOSIS — I82.409 DEEP VEIN THROMBOSIS (DVT) (H): ICD-10-CM

## 2017-10-26 DIAGNOSIS — I82.402 DEEP VEIN THROMBOSIS (DVT) OF LEFT LOWER EXTREMITY, UNSPECIFIED CHRONICITY, UNSPECIFIED VEIN (H): ICD-10-CM

## 2017-10-26 LAB — INR POINT OF CARE: 1.4 (ref 0.86–1.14)

## 2017-10-26 PROCEDURE — 85610 PROTHROMBIN TIME: CPT | Mod: QW

## 2017-10-26 PROCEDURE — 36416 COLLJ CAPILLARY BLOOD SPEC: CPT

## 2017-10-26 PROCEDURE — 99207 ZZC NO CHARGE NURSE ONLY: CPT

## 2017-10-26 NOTE — MR AVS SNAPSHOT
Donato De Leon   10/26/2017 8:00 AM   Anticoagulation Therapy Visit    Description:  47 year old male   Provider:  AN ANTI COAG   Department:  An Nurse           INR as of 10/26/2017     Today's INR 1.4!      Anticoagulation Summary as of 10/26/2017     INR goal 2.0-3.0   Today's INR 1.4!   Full instructions 10 mg every day   Next INR check 11/9/2017    Indications   Deep vein thrombosis (DVT) (H) [I82.409] [I82.409]  Long-term (current) use of anticoagulants [Z79.01] [Z79.01]         Your next Anticoagulation Clinic appointment(s)     Nov 09, 2017  8:00 AM CST   Anticoagulation Visit with AN ANTI COAG   [facility]    [address] [place] [address] [phone]              Contact Numbers     [facility]  Please call  [phone] to cancel and/or reschedule your appointment, or with any problems or questions regarding your therapy.        October 2017 Details    Sun Mon Tue Wed Thu Fri Sat     1               2               3               4               5               6               7                 8               9               10               11               12               13               14                 15               16               17               18               19               20               21                 22               23               24               25               26      10 mg   See details      27      10 mg         28      10 mg           29      10 mg         30      10 mg         31      10 mg              Date Details   10/26 This INR check               How to take your warfarin dose     To take:  10 mg Take 2 of the 5 mg tablets.           November 2017 Details    Sun Mon Tue Wed Thu Fri Sat        1      10 mg         2      10 mg         3      10 mg         4      10 mg           5      10 mg         6      10 mg         7      10 mg         8      10 mg         9            10               11                  12               13               14               15               16               17               18                 19               20               21               22               23               24               25                 26               27               28               29               30                  Date Details   No additional details    Date of next INR:  11/9/2017         How to take your warfarin dose     To take:  10 mg Take 2 of the 5 mg tablets.

## 2017-10-26 NOTE — PROGRESS NOTES
ANTICOAGULATION FOLLOW-UP CLINIC VISIT    Patient Name:  Donato De Leon  Date:  10/26/2017  Contact Type:  Face to Face    SUBJECTIVE:     Patient Findings     Positives No Problem Findings    Comments Sub with dose increase. Denies change in diet or meds and no missed doses. Feeling well. Dose increased again. Unable to come back next week. Die to scheduled. Recheck in 2 wks.            OBJECTIVE    INR Protime   Date Value Ref Range Status   10/26/2017 1.4 (A) 0.86 - 1.14 Final       ASSESSMENT / PLAN  No question data found.  Anticoagulation Summary as of 10/26/2017     INR goal 2.0-3.0   Today's INR 1.4!   Maintenance plan 10 mg (5 mg x 2) every day   Full instructions 10 mg every day   Weekly total 70 mg   Plan last modified Lyudmila Good RN (10/26/2017)   Next INR check 11/9/2017   Target end date     Indications   Deep vein thrombosis (DVT) (H) [I82.409] [I82.409]  Long-term (current) use of anticoagulants [Z79.01] [Z79.01]         Anticoagulation Episode Summary     INR check location     Preferred lab     Send INR reminders to AN ANTICOAG CLINIC    Comments       Anticoagulation Care Providers     Provider Role Specialty Phone number    Carter Skinner PA-C [facility] [phone]            See the Encounter Report to view Anticoagulation Flowsheet and Dosing Calendar (Go to Encounters tab in chart review, and find the Anticoagulation Therapy Visit)        Lyudmila Good RN

## 2017-11-15 ENCOUNTER — ANTICOAGULATION THERAPY VISIT (OUTPATIENT)
Dept: NURSING | Facility: CLINIC | Age: 47
End: 2017-11-15
Payer: COMMERCIAL

## 2017-11-15 DIAGNOSIS — Z79.01 LONG-TERM (CURRENT) USE OF ANTICOAGULANTS: ICD-10-CM

## 2017-11-15 DIAGNOSIS — I82.409 DEEP VEIN THROMBOSIS (DVT) (H): ICD-10-CM

## 2017-11-15 DIAGNOSIS — I82.402 DEEP VEIN THROMBOSIS (DVT) OF LEFT LOWER EXTREMITY, UNSPECIFIED CHRONICITY, UNSPECIFIED VEIN (H): ICD-10-CM

## 2017-11-15 LAB — INR POINT OF CARE: 2.3 (ref 0.86–1.14)

## 2017-11-15 PROCEDURE — 85610 PROTHROMBIN TIME: CPT | Mod: QW

## 2017-11-15 PROCEDURE — 36416 COLLJ CAPILLARY BLOOD SPEC: CPT

## 2017-11-15 PROCEDURE — 99207 ZZC NO CHARGE NURSE ONLY: CPT

## 2017-11-15 RX ORDER — WARFARIN SODIUM 5 MG/1
TABLET ORAL
Qty: 120 TABLET | Refills: 1 | Status: SHIPPED | OUTPATIENT
Start: 2017-11-15 | End: 2018-01-04

## 2017-11-15 NOTE — MR AVS SNAPSHOT
Donato De Leon   11/15/2017 9:15 AM   Anticoagulation Therapy Visit    Description:  47 year old male   Provider:  AN ANTI COAG   Department:  An Nurse           INR as of 11/15/2017     Today's INR 2.3      Anticoagulation Summary as of 11/15/2017     INR goal 2.0-3.0   Today's INR 2.3   Full instructions 10 mg every day   Next INR check 12/6/2017    Indications   Deep vein thrombosis (DVT) (H) [I82.409] [I82.409]  Long-term (current) use of anticoagulants [Z79.01] [Z79.01]         Your next Anticoagulation Clinic appointment(s)     Nov 15, 2017  9:15 AM CST   [facility] AN ANTI [facility]AG   [facility]    [address] [facility] [address] [phone]            Dec 07, 2017  8:00 AM CST   Anticoagulation Visit with AN ANTI COAG   [facility]    [address] [facility] [address] [phone]              Contact Numbers     [facility]  Please call  [phone] to cancel and/or reschedule your appointment, or with any problems or questions regarding your therapy.        November 2017 Details    Sun Mon Tue Wed Thu Fri Sat        1               2               3               4                 5               6               7               8               9               10               11                 12               13               14               15      10 mg   See details      16      10 mg         17      10 mg         18      10 mg           19      10 mg         20      10 mg         21      10 mg         22      10 mg         23      10 mg         24      10 mg         25      10 mg           26      10 mg         27      10 mg         28      10 mg         29      10 mg         30      10 mg            Date Details   11/15 This INR check               How to take your warfarin dose     To take:  10 mg Take 2 of the 5 mg tablets.           December 2017 Details    [facility]  Tue Wed Thu Fri Sat          1      10 mg         2      10 mg           3      10 mg         4      10 mg         5      10 mg         6            7               8               9                 10               11               12               13               14               15               16                 17               18               19               20               21               22               23                 24               25               26               27               28               29               30                 31                      Date Details   No additional details    Date of next INR:  12/6/2017         How to take your warfarin dose     To take:  10 mg Take 2 of the 5 mg tablets.

## 2017-11-15 NOTE — PROGRESS NOTES
ANTICOAGULATION FOLLOW-UP CLINIC VISIT    Patient Name:  Donato De Leon  Date:  11/15/2017  Contact Type:  Face to Face    SUBJECTIVE:     Patient Findings     Positives No Problem Findings    Comments No changes or concerns . Therapeutic. Continue same.           OBJECTIVE    INR Protime   Date Value Ref Range Status   11/15/2017 2.3 (A) 0.86 - 1.14 Final       ASSESSMENT / PLAN  INR assessment THER    Recheck INR In: 3 WEEKS    INR Location Clinic      Anticoagulation Summary as of 11/15/2017     INR goal 2.0-3.0   Today's INR 2.3   Maintenance plan 10 mg (5 mg x 2) every day   Full instructions 10 mg every day   Weekly total 70 mg   No change documented Lyudmila Good RN   Plan last modified Lyudmila Good RN (10/26/2017)   Next INR check 12/6/2017   Target end date     Indications   Deep vein thrombosis (DVT) (H) [I82.409] [I82.409]  Long-term (current) use of anticoagulants [Z79.01] [Z79.01]         Anticoagulation Episode Summary     INR check location     Preferred lab     Send INR reminders to AN ANTICOAG CLINIC    Comments       Anticoagulation Care Providers     Provider Role Specialty Phone number    Carter Skinner PA-C [facility] [phone]            See the Encounter Report to view Anticoagulation Flowsheet and Dosing Calendar (Go to Encounters tab in chart review, and find the Anticoagulation Therapy Visit)    Dosage adjustment made based on physician directed care plan.    Lyudmila Good RN

## 2017-12-07 ENCOUNTER — ANTICOAGULATION THERAPY VISIT (OUTPATIENT)
Dept: NURSING | Facility: CLINIC | Age: 47
End: 2017-12-07
Payer: COMMERCIAL

## 2017-12-07 DIAGNOSIS — I82.402 DEEP VEIN THROMBOSIS (DVT) OF LEFT LOWER EXTREMITY, UNSPECIFIED CHRONICITY, UNSPECIFIED VEIN (H): ICD-10-CM

## 2017-12-07 DIAGNOSIS — Z79.01 LONG-TERM (CURRENT) USE OF ANTICOAGULANTS: ICD-10-CM

## 2017-12-07 LAB — INR POINT OF CARE: 1.8 (ref 0.86–1.14)

## 2017-12-07 PROCEDURE — 99207 ZZC NO CHARGE NURSE ONLY: CPT

## 2017-12-07 PROCEDURE — 85610 PROTHROMBIN TIME: CPT | Mod: QW

## 2017-12-07 PROCEDURE — 36416 COLLJ CAPILLARY BLOOD SPEC: CPT

## 2017-12-07 NOTE — PROGRESS NOTES
ANTICOAGULATION FOLLOW-UP CLINIC VISIT    Patient Name:  Donato De Leon  Date:  12/7/2017  Contact Type:  Face to Face    SUBJECTIVE:     Patient Findings     Positives Unexplained INR or factor level change    Comments Large salad on Monday. No missed doses. Dose adjusted today.            OBJECTIVE    INR Protime   Date Value Ref Range Status   12/07/2017 1.8 (A) 0.86 - 1.14 Final       ASSESSMENT / PLAN  INR assessment SUB    Recheck INR In: 3 WEEKS    INR Location Clinic      Anticoagulation Summary as of 12/7/2017     INR goal 2.0-3.0   Today's INR 1.8!   Maintenance plan 10 mg (5 mg x 2) every day   Full instructions 12/7: 15 mg; Otherwise 10 mg every day   Weekly total 70 mg   Plan last modified Lyudmila Good RN (10/26/2017)   Next INR check 12/28/2017   Target end date     Indications   Deep vein thrombosis (DVT) (H) [I82.409] [I82.409]  Long-term (current) use of anticoagulants [Z79.01] [Z79.01]         Anticoagulation Episode Summary     INR check location     Preferred lab     Send INR reminders to AN ANTICOAG CLINIC    Comments       Anticoagulation Care Providers     Provider Role Specialty Phone number    Carter Skinner PA-C [facility] [phone]            See the Encounter Report to view Anticoagulation Flowsheet and Dosing Calendar (Go to Encounters tab in chart review, and find the Anticoagulation Therapy Visit)    Dosage adjustment made based on physician directed care plan.    Lyudmila Good RN

## 2017-12-07 NOTE — MR AVS SNAPSHOT
Donato De Leon   12/7/2017 8:00 AM   Anticoagulation Therapy Visit    Description:  47 year old male   Provider:  AN ANTI COAG   Department:  An Nurse           INR as of 12/7/2017     Today's INR 1.8!      Anticoagulation Summary as of 12/7/2017     INR goal 2.0-3.0   Today's INR 1.8!   Full instructions 12/7: 15 mg; Otherwise 10 mg every day   Next INR check 12/28/2017    Indications   Deep vein thrombosis (DVT) (H) [I82.409] [I82.409]  Long-term (current) use of anticoagulants [Z79.01] [Z79.01]         Your next Anticoagulation Clinic appointment(s)     Dec 28, 2017  8:00 AM CST   Anticoagulation Visit with AN ANTI COAG   [facility]    [address] [facility] [address] [phone]              Contact Numbers     [facility]  Please call  [phone] to cancel and/or reschedule your appointment, or with any problems or questions regarding your therapy.        December 2017 Details    Sun Mon Tue Wed Thu Fri Sat          1               2                 3               4               5               6               7      15 mg   See details      8      10 mg         9      10 mg           10      10 mg         11      10 mg         12      10 mg         13      10 mg         14      10 mg         15      10 mg         16      10 mg           17      10 mg         18      10 mg         19      10 mg         20      10 mg         21      10 mg         22      10 mg         23      10 mg           24      10 mg         25      10 mg         26      10 mg         27      10 mg         28            29               30                 31                      Date Details   12/07 This INR check       Date of next INR:  12/28/2017         How to take your warfarin dose     To take:  10 mg Take 2 of the 5 mg tablets.    To take:  15 mg Take 3 of the 5 mg tablets.

## 2018-01-04 ENCOUNTER — ANTICOAGULATION THERAPY VISIT (OUTPATIENT)
Dept: NURSING | Facility: CLINIC | Age: 48
End: 2018-01-04
Payer: COMMERCIAL

## 2018-01-04 DIAGNOSIS — I82.402 DEEP VEIN THROMBOSIS (DVT) OF LEFT LOWER EXTREMITY, UNSPECIFIED CHRONICITY, UNSPECIFIED VEIN (H): ICD-10-CM

## 2018-01-04 DIAGNOSIS — Z79.01 LONG-TERM (CURRENT) USE OF ANTICOAGULANTS: ICD-10-CM

## 2018-01-04 DIAGNOSIS — I82.409 DEEP VEIN THROMBOSIS (DVT) (H): ICD-10-CM

## 2018-01-04 LAB — INR POINT OF CARE: 2.1 (ref 0.86–1.14)

## 2018-01-04 PROCEDURE — 85610 PROTHROMBIN TIME: CPT | Mod: QW

## 2018-01-04 PROCEDURE — 99207 ZZC NO CHARGE NURSE ONLY: CPT

## 2018-01-04 PROCEDURE — 36416 COLLJ CAPILLARY BLOOD SPEC: CPT

## 2018-01-04 RX ORDER — WARFARIN SODIUM 5 MG/1
TABLET ORAL
Qty: 180 TABLET | Refills: 1 | Status: SHIPPED | OUTPATIENT
Start: 2018-01-04 | End: 2018-08-01

## 2018-01-04 NOTE — PROGRESS NOTES
ANTICOAGULATION FOLLOW-UP CLINIC VISIT    Patient Name:  Donato De Leon  Date:  1/4/2018  Contact Type:  Face to Face    SUBJECTIVE:     Patient Findings     Positives No Problem Findings           OBJECTIVE    INR Protime   Date Value Ref Range Status   01/04/2018 2.1 (A) 0.86 - 1.14 Final       ASSESSMENT / PLAN  INR assessment THER    Recheck INR In: 4 WEEKS    INR Location Clinic      Anticoagulation Summary as of 1/4/2018     INR goal 2.0-3.0   Today's INR 2.1   Maintenance plan 10 mg (5 mg x 2) every day   Full instructions 10 mg every day   Weekly total 70 mg   No change documented Lyudmila Good RN   Plan last modified Lyudmila Good RN (10/26/2017)   Next INR check 2/1/2018   Target end date     Indications   Deep vein thrombosis (DVT) (H) [I82.409] [I82.409]  Long-term (current) use of anticoagulants [Z79.01] [Z79.01]         Anticoagulation Episode Summary     INR check location     Preferred lab     Send INR reminders to AN ANTICOAG CLINIC    Comments       Anticoagulation Care Providers     Provider Role Specialty Phone number    Carter Skinner PA-C [facility] [phone]            See the Encounter Report to view Anticoagulation Flowsheet and Dosing Calendar (Go to Encounters tab in chart review, and find the Anticoagulation Therapy Visit)        Lyudmila Good RN

## 2018-01-04 NOTE — MR AVS SNAPSHOT
Donato De Leon   1/4/2018 8:30 AM   Anticoagulation Therapy Visit    Description:  47 year old male   Provider:  AN ANTI COAG   Department:  An Nurse           INR as of 1/4/2018     Today's INR 2.1      Anticoagulation Summary as of 1/4/2018     INR goal 2.0-3.0   Today's INR 2.1   Full instructions 10 mg every day   Next INR check 2/1/2018    Indications   Deep vein thrombosis (DVT) (H) [I82.409] [I82.409]  Long-term (current) use of anticoagulants [Z79.01] [Z79.01]         Your next Anticoagulation Clinic appointment(s)     Feb 01, 2018  8:00 AM CST   Anticoagulation Visit with AN ANTI COAG   [facility]    [address] [facility] [address] [phone]              Contact Numbers     [facility]  Please call  [phone] to cancel and/or reschedule your appointment, or with any problems or questions regarding your therapy.        January 2018 Details    Sun Mon Tue Wed Thu Fri Sat      1               2               3               4      10 mg   See details      5      10 mg         6      10 mg           7      10 mg         8      10 mg         9      10 mg         10      10 mg         11      10 mg         12      10 mg         13      10 mg           14      10 mg         15      10 mg         16      10 mg         17      10 mg         18      10 mg         19      10 mg         20      10 mg           21      10 mg         22      10 mg         23      10 mg         24      10 mg         25      10 mg         26      10 mg         27      10 mg           28      10 mg         29      10 mg         30      10 mg         31      10 mg             Date Details   01/04 This INR check               How to take your warfarin dose     To take:  10 mg Take 2 of the 5 mg tablets.           February 2018 Details    Sun Mon Tue Wed Thu Fri Sat         1            2               3                 4               5               6               7                8               9               10                 11               12               13               14               15               16               17                 18               19               20               21               22               23               24                 25               26               27               28                   Date Details   No additional details    Date of next INR:  2/1/2018         How to take your warfarin dose     To take:  10 mg Take 2 of the 5 mg tablets.

## 2018-01-17 ENCOUNTER — TRANSFERRED RECORDS (OUTPATIENT)
Dept: HEALTH INFORMATION MANAGEMENT | Facility: CLINIC | Age: 48
End: 2018-01-17

## 2018-02-02 ENCOUNTER — ANTICOAGULATION THERAPY VISIT (OUTPATIENT)
Dept: NURSING | Facility: CLINIC | Age: 48
End: 2018-02-02
Payer: COMMERCIAL

## 2018-02-02 DIAGNOSIS — I82.402 DEEP VEIN THROMBOSIS (DVT) OF LEFT LOWER EXTREMITY, UNSPECIFIED CHRONICITY, UNSPECIFIED VEIN (H): ICD-10-CM

## 2018-02-02 DIAGNOSIS — Z79.01 LONG-TERM (CURRENT) USE OF ANTICOAGULANTS: ICD-10-CM

## 2018-02-02 LAB — INR POINT OF CARE: 1.5 (ref 0.86–1.14)

## 2018-02-02 PROCEDURE — 85610 PROTHROMBIN TIME: CPT | Mod: QW

## 2018-02-02 PROCEDURE — 36416 COLLJ CAPILLARY BLOOD SPEC: CPT

## 2018-02-02 PROCEDURE — 99207 ZZC NO CHARGE NURSE ONLY: CPT

## 2018-02-02 NOTE — MR AVS SNAPSHOT
Donato De Leon   2/2/2018 9:15 AM   Anticoagulation Therapy Visit    Description:  47 year old male   Provider:  AN ANTI COAG   Department:  An Nurse           INR as of 2/2/2018     Today's INR 1.5!      Anticoagulation Summary as of 2/2/2018     INR goal 2.0-3.0   Today's INR 1.5!   Full instructions 2/2: 12.5 mg; 2/3: 12.5 mg; Otherwise 10 mg every day   Next INR check 2/7/2018    Indications   Deep vein thrombosis (DVT) (H) [I82.409] [I82.409]  Long-term (current) use of anticoagulants [Z79.01] [Z79.01]         Your next Anticoagulation Clinic appointment(s)     Feb 02, 2018  9:15 AM CST   Anticoagulation Visit with AN ANTI COAG   [facility]    [address] [facility] [id number] [phone]            Feb 07, 2018 10:00 AM CST   Anticoagulation Visit with AN ANTI COAG   [facility]    [address] [facility] [address] [phone]              Contact Numbers     [facility]  Please call  [phone] to cancel and/or reschedule your appointment, or with any problems or questions regarding your therapy.        February 2018 Details    Sun Mon Tue Wed Thu Fri Sat         1               2      12.5 mg   See details      3      12.5 mg           4      10 mg         5      10 mg         6      10 mg         7            8               9               10                 11               12               13               14               15               16               17                 18               19               20               21               22               23               24                 25               26               27               28                   Date Details   02/02 This INR check       Date of next INR:  2/7/2018         How to take your warfarin dose     To take:  10 mg Take 2 of the 5 mg tablets.    To take:  12.5 mg Take 2.5 of the 5 mg tablets.

## 2018-02-02 NOTE — PROGRESS NOTES
ANTICOAGULATION FOLLOW-UP CLINIC VISIT    Patient Name:  Donato De Leon  Date:  2/2/2018  Contact Type:  Face to Face    SUBJECTIVE:     Patient Findings     Positives Blood in stool, Intentional hold of therapy    Comments Reports had flare of ulcerative colitis and was bleeding in stools so held warfarin for about 2 wks until bleeding subsided. Resumed about 10 days ago. Seeing GI for follow up. Reminded to call if concerns.            OBJECTIVE    INR Protime   Date Value Ref Range Status   02/02/2018 1.5 (A) 0.86 - 1.14 Final       ASSESSMENT / PLAN  INR assessment SUB    Recheck INR In: 5 DAYS    INR Location Clinic      Anticoagulation Summary as of 2/2/2018     INR goal 2.0-3.0   Today's INR 1.5!   Maintenance plan 10 mg (5 mg x 2) every day   Full instructions 2/2: 12.5 mg; 2/3: 12.5 mg; Otherwise 10 mg every day   Weekly total 70 mg   Plan last modified Lyudmila Good RN (10/26/2017)   Next INR check 2/7/2018   Target end date     Indications   Deep vein thrombosis (DVT) (H) [I82.409] [I82.409]  Long-term (current) use of anticoagulants [Z79.01] [Z79.01]         Anticoagulation Episode Summary     INR check location     Preferred lab     Send INR reminders to AN ANTICOAG CLINIC    Comments       Anticoagulation Care Providers     Provider Role Specialty Phone number    Carter Skinner PA-C [facility] [phone]            See the Encounter Report to view Anticoagulation Flowsheet and Dosing Calendar (Go to Encounters tab in chart review, and find the Anticoagulation Therapy Visit)    Dosage adjustment made based on physician directed care plan.    Lyudmila Good RN

## 2018-02-07 ENCOUNTER — ANTICOAGULATION THERAPY VISIT (OUTPATIENT)
Dept: NURSING | Facility: CLINIC | Age: 48
End: 2018-02-07
Payer: COMMERCIAL

## 2018-02-07 DIAGNOSIS — I82.402 DEEP VEIN THROMBOSIS (DVT) OF LEFT LOWER EXTREMITY, UNSPECIFIED CHRONICITY, UNSPECIFIED VEIN (H): ICD-10-CM

## 2018-02-07 DIAGNOSIS — Z79.01 LONG-TERM (CURRENT) USE OF ANTICOAGULANTS: ICD-10-CM

## 2018-02-07 LAB — INR POINT OF CARE: 2.7 (ref 0.86–1.14)

## 2018-02-07 PROCEDURE — 36416 COLLJ CAPILLARY BLOOD SPEC: CPT

## 2018-02-07 PROCEDURE — 99207 ZZC NO CHARGE NURSE ONLY: CPT

## 2018-02-07 PROCEDURE — 85610 PROTHROMBIN TIME: CPT | Mod: QW

## 2018-02-07 NOTE — PROGRESS NOTES
ANTICOAGULATION FOLLOW-UP CLINIC VISIT    Patient Name:  Donato De Leon  Date:  2/7/2018  Contact Type:  Face to Face    SUBJECTIVE:     Patient Findings     Comments Therapeutic. Resume weekly doses. Call if concerns or changes.           OBJECTIVE    INR Protime   Date Value Ref Range Status   02/07/2018 2.7 (A) 0.86 - 1.14 Final       ASSESSMENT / PLAN  INR assessment THER    Recheck INR In: 5 WEEKS    INR Location Clinic      Anticoagulation Summary as of 2/7/2018     INR goal 2.0-3.0   Today's INR 2.7   Maintenance plan 10 mg (5 mg x 2) every day   Full instructions 10 mg every day   Weekly total 70 mg   No change documented Lyudmila Good RN   Plan last modified Lyudmila Good RN (10/26/2017)   Next INR check 3/13/2018   Target end date     Indications   Deep vein thrombosis (DVT) (H) [I82.409] [I82.409]  Long-term (current) use of anticoagulants [Z79.01] [Z79.01]         Anticoagulation Episode Summary     INR check location     Preferred lab     Send INR reminders to AN ANTICOAG CLINIC    Comments       Anticoagulation Care Providers     Provider Role Specialty Phone number    Carter Skinner PA-C [facility] [phone]            See the Encounter Report to view Anticoagulation Flowsheet and Dosing Calendar (Go to Encounters tab in chart review, and find the Anticoagulation Therapy Visit)        Lyudmila Good RN

## 2018-02-07 NOTE — MR AVS SNAPSHOT
Donato De Leon   2/7/2018 10:00 AM   Anticoagulation Therapy Visit    Description:  47 year old male   Provider:  AN ANTI COAG   Department:  An Nurse           INR as of 2/7/2018     Today's INR 2.7      Anticoagulation Summary as of 2/7/2018     INR goal 2.0-3.0   Today's INR 2.7   Full instructions 10 mg every day   Next INR check 3/13/2018    Indications   Deep vein thrombosis (DVT) (H) [I82.409] [I82.409]  Long-term (current) use of anticoagulants [Z79.01] [Z79.01]         Your next Anticoagulation Clinic appointment(s)     Mar 13, 2018  8:15 AM CDT   Anticoagulation Visit with AN ANTI COAG   [facility]    [address] [facility] [address] [phone]              Contact Numbers     [facility]  Please call  [phone] to cancel and/or reschedule your appointment, or with any problems or questions regarding your therapy.        February 2018 Details    Sun Mon Tue Wed Thu Fri Sat         1               2               3                 4               5               6               7      10 mg   See details      8      10 mg         9      10 mg         10      10 mg           11      10 mg         12      10 mg         13      10 mg         14      10 mg         15      10 mg         16      10 mg         17      10 mg           18      10 mg         19      10 mg         20      10 mg         21      10 mg         22      10 mg         23      10 mg         24      10 mg           25      10 mg         26      10 mg         27      10 mg         28      10 mg             Date Details   02/07 This INR check               How to take your warfarin dose     To take:  10 mg Take 2 of the 5 mg tablets.           March 2018 Details    Sun Mon Tue Wed Thu Fri Sat         1      10 mg         2      10 mg         3      10 mg           4      10 mg         5      10 mg         6      10 mg         7      10 mg         8      10 mg         9      10  mg         10      10 mg           11      10 mg         12      10 mg         13            14               15               16               17                 18               19               20               21               22               23               24                 25               26               27               28               29               30               31                Date Details   No additional details    Date of next INR:  3/13/2018         How to take your warfarin dose     To take:  10 mg Take 2 of the 5 mg tablets.

## 2018-03-23 ENCOUNTER — ANTICOAGULATION THERAPY VISIT (OUTPATIENT)
Dept: NURSING | Facility: CLINIC | Age: 48
End: 2018-03-23
Payer: COMMERCIAL

## 2018-03-23 DIAGNOSIS — Z79.01 LONG-TERM (CURRENT) USE OF ANTICOAGULANTS: ICD-10-CM

## 2018-03-23 DIAGNOSIS — I82.402 DEEP VEIN THROMBOSIS (DVT) OF LEFT LOWER EXTREMITY, UNSPECIFIED CHRONICITY, UNSPECIFIED VEIN (H): ICD-10-CM

## 2018-03-23 LAB — INR POINT OF CARE: 1.2 (ref 0.86–1.14)

## 2018-03-23 PROCEDURE — 85610 PROTHROMBIN TIME: CPT | Mod: QW

## 2018-03-23 PROCEDURE — 99207 ZZC NO CHARGE NURSE ONLY: CPT

## 2018-03-23 PROCEDURE — 36416 COLLJ CAPILLARY BLOOD SPEC: CPT

## 2018-03-23 NOTE — MR AVS SNAPSHOT
Donato DeL eon   3/23/2018 8:30 AM   Anticoagulation Therapy Visit    Description:  48 year old male   Provider:  AN ANTI COAG   Department:  An Nurse           INR as of 3/23/2018     Today's INR 1.2!      Anticoagulation Summary as of 3/23/2018     INR goal 2.0-3.0   Today's INR 1.2!   Full instructions 10 mg every day   Next INR check 4/10/2018    Indications   Deep vein thrombosis (DVT) (H) [I82.409] [I82.409]  Long-term (current) use of anticoagulants [Z79.01] [Z79.01]         Your next Anticoagulation Clinic appointment(s)     Apr 11, 2018 11:30 AM CDT   Anticoagulation Visit with AN ANTI COAG   [facility] ([facility])    [address] [place] [address] [phone]              Contact Numbers     [facility]  Please call  [phone] to cancel and/or reschedule your appointment, or with any problems or questions regarding your therapy.        March 2018 Details    Sun Mon Tue Wed Thu Fri Sat         1               2               3                 4               5               6               7               8               9               10                 11               12               13               14               15               16               17                 18               19               20               21               22               23      10 mg   See details      24      10 mg           25      10 mg         26      10 mg         27      10 mg         28      10 mg         29      10 mg         30      10 mg         31      10 mg          Date Details   03/23 This INR check               How to take your warfarin dose     To take:  10 mg Take 2 of the 5 mg tablets.           April 2018 Details    Sun Mon Tue Wed Thu Fri Sat     1      10 mg         2      10 mg         3      10 mg         4      10 mg         5      10 mg         6      10 mg         7      10 mg           8      10 mg         9      10 mg         10             11               12               13               14                 15               16               17               18               19               20               21                 22               23               24               25               26               27               28                 29               30                     Date Details   No additional details    Date of next INR:  4/10/2018         How to take your warfarin dose     To take:  10 mg Take 2 of the 5 mg tablets.

## 2018-03-23 NOTE — PROGRESS NOTES
ANTICOAGULATION FOLLOW-UP CLINIC VISIT    Patient Name:  Donato De Leon  Date:  3/23/2018  Contact Type:  Face to Face    SUBJECTIVE:     Patient Findings     Positives Blood in stool, Intentional hold of therapy    Comments INR 1.2 due to recent hold. Reports had rectal bleeding starting around 3/11 and so stopped the warfarin. He was scheduled for next treatment of colitis on 3/14 with GI so he did not call or report the bleeding. He is seeing provider at [facility] for treatment of colitis.States the GI provider is aware of the bleeding.  States the current treatment has not been keeping the colitis under control so has now added additional methotrexate once weekly on Mondays. He dose not know the dose of Methotrexate. His rectal bleeding stopped about a week ago and he resumed his warfarin 3 days ago. Will continue same dose of warfarin. Call if changes or concerns.            OBJECTIVE    INR Protime   Date Value Ref Range Status   03/23/2018 1.2 (A) 0.86 - 1.14 Final       ASSESSMENT / PLAN  INR assessment SUB    Recheck INR In: 2 WEEKS    INR Location Clinic      Anticoagulation Summary as of 3/23/2018     INR goal 2.0-3.0   Today's INR 1.2!   Maintenance plan 10 mg (5 mg x 2) every day   Full instructions 10 mg every day   Weekly total 70 mg   Plan last modified Lyudmila Good, RN (10/26/2017)   Next INR check 4/10/2018   Target end date     Indications   Deep vein thrombosis (DVT) (H) [I82.409] [I82.409]  Long-term (current) use of anticoagulants [Z79.01] [Z79.01]         Anticoagulation Episode Summary     INR check location     Preferred lab     Send INR reminders to AN ANTICOAG CLINIC    Comments       Anticoagulation Care Providers     Provider Role Specialty Phone number    Carter Skinner PA-C [facility] [phone]            See the Encounter Report to view Anticoagulation Flowsheet and Dosing Calendar (Go to Encounters tab in chart review, and find the Anticoagulation  Therapy Visit)    Dosage adjustment made based on physician directed care plan.    Lyudmila Good RN

## 2018-04-11 ENCOUNTER — ANTICOAGULATION THERAPY VISIT (OUTPATIENT)
Dept: NURSING | Facility: CLINIC | Age: 48
End: 2018-04-11
Payer: COMMERCIAL

## 2018-04-11 ENCOUNTER — TELEPHONE (OUTPATIENT)
Dept: FAMILY MEDICINE | Facility: CLINIC | Age: 48
End: 2018-04-11

## 2018-04-11 DIAGNOSIS — Z79.01 LONG-TERM (CURRENT) USE OF ANTICOAGULANTS: ICD-10-CM

## 2018-04-11 DIAGNOSIS — I82.402 DEEP VEIN THROMBOSIS (DVT) OF LEFT LOWER EXTREMITY, UNSPECIFIED CHRONICITY, UNSPECIFIED VEIN (H): ICD-10-CM

## 2018-04-11 DIAGNOSIS — I82.402 DEEP VEIN THROMBOSIS (DVT) OF LEFT LOWER EXTREMITY, UNSPECIFIED CHRONICITY, UNSPECIFIED VEIN (H): Primary | ICD-10-CM

## 2018-04-11 LAB — INR POINT OF CARE: 2.6 (ref 0.86–1.14)

## 2018-04-11 PROCEDURE — 36416 COLLJ CAPILLARY BLOOD SPEC: CPT

## 2018-04-11 PROCEDURE — 99207 ZZC NO CHARGE NURSE ONLY: CPT

## 2018-04-11 PROCEDURE — 85610 PROTHROMBIN TIME: CPT | Mod: QW

## 2018-04-11 NOTE — TELEPHONE ENCOUNTER
Has the patient previously taken warfarin? yes  If yes, for what indication? DVT    Does the patient have any of the following indications for a higher range of 2.5-3.5:    Mitral position mechanical valve? no    John-Shiley, Ball and Cage or Monoleaflet valve (regardless of position) no    Other (if yes, please explain) no    Annual INR referral needed. Order pended.  Thank you. Lyudmila Good RN

## 2018-04-11 NOTE — MR AVS SNAPSHOT
Donato De Leon   4/11/2018 11:30 AM   Anticoagulation Therapy Visit    Description:  48 year old male   Provider:  AN ANTI COAG   Department:  An Nurse           INR as of 4/11/2018     Today's INR 2.6      Anticoagulation Summary as of 4/11/2018     INR goal 2.0-3.0   Today's INR 2.6   Full instructions 10 mg every day   Next INR check 5/15/2018    Indications   Deep vein thrombosis (DVT) (H) [I82.409] [I82.409]  Long-term (current) use of anticoagulants [Z79.01] [Z79.01]         Your next Anticoagulation Clinic appointment(s)     May 15, 2018  8:30 AM CDT   Anticoagulation Visit with AN ANTI COAG   [facility]    [address] [facility] [address] [phone]              Contact Numbers     [facility]  Please call  [phone] to cancel and/or reschedule your appointment, or with any problems or questions regarding your therapy.        April 2018 Details    Sun Mon Tue Wed u Fri Sat     1               2               3               4               5               6               7                 8               9               10               11      10 mg   See details      12      10 mg         13      10 mg         14      10 mg           15      10 mg         16      10 mg         17      10 mg         18      10 mg         19      10 mg         20      10 mg         21      10 mg           22      10 mg         23      10 mg         24      10 mg         25      10 mg         26      10 mg         27      10 mg         28      10 mg           29      10 mg         30      10 mg               Date Details   04/11 This INR check               How to take your warfarin dose     To take:  10 mg Take 2 of the 5 mg tablets.           May 2018 Details    Sun Mon Tue Wed u Fri Sat       1      10 mg         2      10 mg         3      10 mg         4      10 mg         5      10 mg           6      10 mg         7      10 mg         8      10 mg          9      10 mg         10      10 mg         11      10 mg         12      10 mg           13      10 mg         14      10 mg         15            16               17               18               19                 20               21               22               23               24               25               26                 27               28               29               30               31                  Date Details   No additional details    Date of next INR:  5/15/2018         How to take your warfarin dose     To take:  10 mg Take 2 of the 5 mg tablets.

## 2018-04-11 NOTE — PROGRESS NOTES
ANTICOAGULATION FOLLOW-UP CLINIC VISIT    Patient Name:  Donato De Leon  Date:  4/11/2018  Contact Type:  Face to Face    SUBJECTIVE:     Patient Findings     Positives No Problem Findings    Comments Therapeutic. No rectal bleeding since last visit and states doing well with present treatment for ulcerative colitis. Continue same dose and call if any concerns .            OBJECTIVE    INR Protime   Date Value Ref Range Status   04/11/2018 2.6 (A) 0.86 - 1.14 Final       ASSESSMENT / PLAN  INR assessment THER    Recheck INR In: 5 WEEKS    INR Location Clinic      Anticoagulation Summary as of 4/11/2018     INR goal 2.0-3.0   Today's INR 2.6   Maintenance plan 10 mg (5 mg x 2) every day   Full instructions 10 mg every day   Weekly total 70 mg   No change documented Lyudmila Good RN   Plan last modified Lyudmila Good RN (10/26/2017)   Next INR check 5/15/2018   Target end date     Indications   Deep vein thrombosis (DVT) (H) [I82.409] [I82.409]  Long-term (current) use of anticoagulants [Z79.01] [Z79.01]         Anticoagulation Episode Summary     INR check location     Preferred lab     Send INR reminders to AN ANTICOAG CLINIC    Comments       Anticoagulation Care Providers     Provider Role Specialty Phone number    Carter Skinner PA-C [facility] [phone]            See the Encounter Report to view Anticoagulation Flowsheet and Dosing Calendar (Go to Encounters tab in chart review, and find the Anticoagulation Therapy Visit)        Lyudmila Good RN

## 2018-05-08 ENCOUNTER — TRANSFERRED RECORDS (OUTPATIENT)
Dept: HEALTH INFORMATION MANAGEMENT | Facility: CLINIC | Age: 48
End: 2018-05-08

## 2018-05-15 ENCOUNTER — ANTICOAGULATION THERAPY VISIT (OUTPATIENT)
Dept: NURSING | Facility: CLINIC | Age: 48
End: 2018-05-15
Payer: COMMERCIAL

## 2018-05-15 DIAGNOSIS — Z79.01 LONG-TERM (CURRENT) USE OF ANTICOAGULANTS: ICD-10-CM

## 2018-05-15 DIAGNOSIS — I82.402 DEEP VEIN THROMBOSIS (DVT) OF LEFT LOWER EXTREMITY, UNSPECIFIED CHRONICITY, UNSPECIFIED VEIN (H): ICD-10-CM

## 2018-05-15 LAB — INR POINT OF CARE: 2 (ref 0.86–1.14)

## 2018-05-15 PROCEDURE — 85610 PROTHROMBIN TIME: CPT | Mod: QW

## 2018-05-15 PROCEDURE — 99207 ZZC NO CHARGE NURSE ONLY: CPT

## 2018-05-15 PROCEDURE — 36416 COLLJ CAPILLARY BLOOD SPEC: CPT

## 2018-05-15 NOTE — PROGRESS NOTES
ANTICOAGULATION FOLLOW-UP CLINIC VISIT    Patient Name:  Donato De Leon  Date:  5/15/2018  Contact Type:  Face to Face    SUBJECTIVE:     Patient Findings     Positives No Problem Findings    Comments Ulcerative colitis is under control. No new concerns. Continue same.            OBJECTIVE    INR Protime   Date Value Ref Range Status   05/15/2018 2.0 (A) 0.86 - 1.14 Final       ASSESSMENT / PLAN  INR assessment THER    Recheck INR In: 4 WEEKS    INR Location Clinic      Anticoagulation Summary as of 5/15/2018     INR goal 2.0-3.0   Today's INR 2.0   Maintenance plan 10 mg (5 mg x 2) every day   Full instructions 10 mg every day   Weekly total 70 mg   No change documented Lyudmila Good RN   Plan last modified Lyudmila Good RN (10/26/2017)   Next INR check 6/15/2018   Target end date     Indications   Deep vein thrombosis (DVT) (H) [I82.409] [I82.409]  Long-term (current) use of anticoagulants [Z79.01] [Z79.01]         Anticoagulation Episode Summary     INR check location     Preferred lab     Send INR reminders to AN ANTICOAG CLINIC    Comments       Anticoagulation Care Providers     Provider Role Specialty Phone number    Carter Skinner PA-C [facility] [phone]            See the Encounter Report to view Anticoagulation Flowsheet and Dosing Calendar (Go to Encounters tab in chart review, and find the Anticoagulation Therapy Visit)        Lyudmila Good RN

## 2018-05-15 NOTE — MR AVS SNAPSHOT
Donato De Leon   5/15/2018 8:30 AM   Anticoagulation Therapy Visit    Description:  48 year old male   Provider:  AN ANTI COAG   Department:  An Nurse           INR as of 5/15/2018     Today's INR 2.0      Anticoagulation Summary as of 5/15/2018     INR goal 2.0-3.0   Today's INR 2.0   Full instructions 10 mg every day   Next INR check 6/15/2018    Indications   Deep vein thrombosis (DVT) (H) [I82.409] [I82.409]  Long-term (current) use of anticoagulants [Z79.01] [Z79.01]         Your next Anticoagulation Clinic appointment(s)     Samy 15, 2018  9:15 AM CDT   Anticoagulation Visit with AN ANTI COAG   [facility] ([facility])    [address] [facility] [address] [phone]              Contact Numbers     [facility]  Please call  [phone] to cancel and/or reschedule your appointment, or with any problems or questions regarding your therapy.        May 2018 Details    Sun Mon Tue Wed Thu Fri Sat       1               2               3               4               5                 6               7               8               9               10               11               12                 13               14               15      10 mg   See details      16      10 mg         17      10 mg         18      10 mg         19      10 mg           20      10 mg         21      10 mg         22      10 mg         23      10 mg         24      10 mg         25      10 mg         26      10 mg           27      10 mg         28      10 mg         29      10 mg         30      10 mg         31      10 mg            Date Details   05/15 This INR check               How to take your warfarin dose     To take:  10 mg Take 2 of the 5 mg tablets.           June 2018 Details    Sun Mon Tue Wed Thu Fri Sat          1      10 mg         2      10 mg           3      10 mg         4      10 mg         5      10 mg         6      10 mg         7      10 mg         8      10  mg         9      10 mg           10      10 mg         11      10 mg         12      10 mg         13      10 mg         14      10 mg         15            16                 17               18               19               20               21               22               23                 24               25               26               27               28               29               30                Date Details   No additional details    Date of next INR:  6/15/2018         How to take your warfarin dose     To take:  10 mg Take 2 of the 5 mg tablets.

## 2018-06-06 ENCOUNTER — TRANSFERRED RECORDS (OUTPATIENT)
Dept: HEALTH INFORMATION MANAGEMENT | Facility: CLINIC | Age: 48
End: 2018-06-06

## 2018-07-02 ENCOUNTER — TRANSFERRED RECORDS (OUTPATIENT)
Dept: HEALTH INFORMATION MANAGEMENT | Facility: CLINIC | Age: 48
End: 2018-07-02

## 2018-07-02 ENCOUNTER — MEDICAL CORRESPONDENCE (OUTPATIENT)
Dept: HEALTH INFORMATION MANAGEMENT | Facility: CLINIC | Age: 48
End: 2018-07-02

## 2018-07-31 NOTE — PROGRESS NOTES
"  SUBJECTIVE:   CC: Donato De Leon is an 48 year old male who presents for preventative health visit.     Healthy Habits:    Do you get at least three servings of calcium containing foods daily (dairy, green leafy vegetables, etc.)? {YES/NO, DAIRY INTAKE:794146::\"yes\"}    Amount of exercise or daily activities, outside of work: {AMOUNT EXERCISE:405300}    Problems taking medications regularly {Yes /No default:387428::\"No\"}    Medication side effects: {Yes /No default.:998708::\"No\"}    Have you had an eye exam in the past two years? {YESNOBLANK:107296}    Do you see a dentist twice per year? {YESNOBLANK:785021}    Do you have sleep apnea, excessive snoring or daytime drowsiness?{YESNOBLANK:095792}  {Outside tests to abstract? :388269}     {additional problems to add (Optional):927065}    Today's PHQ-2 Score:   PHQ-2 ( 1999 Pfizer) 4/19/2016 3/19/2015   Q1: Little interest or pleasure in doing things 0 0   Q2: Feeling down, depressed or hopeless 0 0   PHQ-2 Score 0 0     {PHQ-2 LOOK IN ASSESSMENTS :672084}  Abuse: Current or Past(Physical, Sexual or Emotional)- {YES/NO/NA:821195}  Do you feel safe in your environment - {YES/NO/NA:886527}    Social History   Substance Use Topics     Smoking status: Never Smoker     Smokeless tobacco: Never Used     Alcohol use Yes      Comment: rare      If you drink alcohol do you typically have >3 drinks per day or >7 drinks per week? {ETOH :578635}                      Last PSA: No results found for: PSA    Reviewed orders with patient. Reviewed health maintenance and updated orders accordingly - {Yes/No:188934::\"Yes\"}  {Chronicprobdata (Optional):440735}    Reviewed and updated as needed this visit by clinical staff         Reviewed and updated as needed this visit by Provider        {HISTORY OPTIONS (Optional):412626}    ROS:  { :720524::\"CONSTITUTIONAL: NEGATIVE for fever, chills, change in weight\",\"INTEGUMENTARY/SKIN: NEGATIVE for worrisome rashes, moles or lesions\",\"EYES: " "NEGATIVE for vision changes or irritation\",\"ENT: NEGATIVE for ear, mouth and throat problems\",\"RESP: NEGATIVE for significant cough or SOB\",\"CV: NEGATIVE for chest pain, palpitations or peripheral edema\",\"GI: NEGATIVE for nausea, abdominal pain, heartburn, or change in bowel habits\",\" male: negative for dysuria, hematuria, decreased urinary stream, erectile dysfunction, urethral discharge\",\"MUSCULOSKELETAL: NEGATIVE for significant arthralgias or myalgia\",\"NEURO: NEGATIVE for weakness, dizziness or paresthesias\",\"PSYCHIATRIC: NEGATIVE for changes in mood or affect\"}    OBJECTIVE:   There were no vitals taken for this visit.  EXAM:  {Exam Choices:920051}    {Diagnostic Test Results (Optional):354421::\"Diagnostic Test Results:\",\"none \"}    ASSESSMENT/PLAN:   {Diag Picklist:816322}    COUNSELING:  {MALE COUNSELING MESSAGES:667501::\"Reviewed preventive health counseling, as reflected in patient instructions\"}    BP Readings from Last 1 Encounters:   07/25/17 (!) 133/93     Estimated body mass index is 34.89 kg/(m^2) as calculated from the following:    Height as of 7/25/17: 6' 0.99\" (1.854 m).    Weight as of 7/25/17: 264 lb 6.4 oz (119.9 kg).    {BP Counseling- Complete if BP >= 120/80  (Optional):736904}  {Weight Management Plan (ACO) Complete if BMI is abnormal-  Ages 18-64  BMI >24.9.  Age 65+ with BMI <23 or >30 (Optional):111743}     reports that he has never smoked. He has never used smokeless tobacco.  {Tobacco Cessation -- Complete if patient is a smoker (Optional):346310}    Counseling Resources:  ATP IV Guidelines  Pooled Cohorts Equation Calculator  FRAX Risk Assessment  ICSI Preventive Guidelines  Dietary Guidelines for Americans, 2010  USDA's MyPlate  ASA Prophylaxis  Lung CA Screening    Carter Skinner PA-C  [facility]  "

## 2018-08-01 ENCOUNTER — OFFICE VISIT (OUTPATIENT)
Dept: FAMILY MEDICINE | Facility: CLINIC | Age: 48
End: 2018-08-01
Payer: COMMERCIAL

## 2018-08-01 ENCOUNTER — ANTICOAGULATION THERAPY VISIT (OUTPATIENT)
Dept: FAMILY MEDICINE | Facility: CLINIC | Age: 48
End: 2018-08-01

## 2018-08-01 ENCOUNTER — TRANSFERRED RECORDS (OUTPATIENT)
Dept: HEALTH INFORMATION MANAGEMENT | Facility: CLINIC | Age: 48
End: 2018-08-01

## 2018-08-01 VITALS
HEART RATE: 96 BPM | RESPIRATION RATE: 16 BRPM | WEIGHT: 262 LBS | HEIGHT: 73 IN | OXYGEN SATURATION: 98 % | DIASTOLIC BLOOD PRESSURE: 84 MMHG | SYSTOLIC BLOOD PRESSURE: 126 MMHG | TEMPERATURE: 96.8 F | BODY MASS INDEX: 34.72 KG/M2

## 2018-08-01 DIAGNOSIS — Z00.00 ROUTINE GENERAL MEDICAL EXAMINATION AT A HEALTH CARE FACILITY: Primary | ICD-10-CM

## 2018-08-01 DIAGNOSIS — I82.402 DEEP VEIN THROMBOSIS (DVT) OF LEFT LOWER EXTREMITY, UNSPECIFIED CHRONICITY, UNSPECIFIED VEIN (H): ICD-10-CM

## 2018-08-01 DIAGNOSIS — Z79.01 LONG-TERM (CURRENT) USE OF ANTICOAGULANTS: ICD-10-CM

## 2018-08-01 DIAGNOSIS — M10.9 GOUTY ARTHRITIS OF TOE OF RIGHT FOOT: ICD-10-CM

## 2018-08-01 DIAGNOSIS — R79.89 ELEVATED LFTS: ICD-10-CM

## 2018-08-01 DIAGNOSIS — K51.919 ULCERATIVE COLITIS WITH COMPLICATION, UNSPECIFIED LOCATION (H): ICD-10-CM

## 2018-08-01 LAB
ALBUMIN SERPL-MCNC: 4 G/DL (ref 3.4–5)
ALP SERPL-CCNC: 53 U/L (ref 40–150)
ALT SERPL W P-5'-P-CCNC: 72 U/L (ref 0–70)
ANION GAP SERPL CALCULATED.3IONS-SCNC: 8 MMOL/L (ref 3–14)
AST SERPL W P-5'-P-CCNC: 54 U/L (ref 0–45)
BILIRUB SERPL-MCNC: 0.9 MG/DL (ref 0.2–1.3)
BUN SERPL-MCNC: 20 MG/DL (ref 7–30)
CALCIUM SERPL-MCNC: 9.1 MG/DL (ref 8.5–10.1)
CHLORIDE SERPL-SCNC: 105 MMOL/L (ref 94–109)
CHOLEST SERPL-MCNC: 228 MG/DL
CO2 SERPL-SCNC: 26 MMOL/L (ref 20–32)
CREAT SERPL-MCNC: 1.16 MG/DL (ref 0.66–1.25)
GFR SERPL CREATININE-BSD FRML MDRD: 67 ML/MIN/1.7M2
GLUCOSE SERPL-MCNC: 85 MG/DL (ref 70–99)
HDLC SERPL-MCNC: 92 MG/DL
INR PPP: 2.3 (ref 0.86–1.14)
LDLC SERPL CALC-MCNC: 125 MG/DL
NONHDLC SERPL-MCNC: 136 MG/DL
POTASSIUM SERPL-SCNC: 4.5 MMOL/L (ref 3.4–5.3)
PROT SERPL-MCNC: 7.8 G/DL (ref 6.8–8.8)
SODIUM SERPL-SCNC: 139 MMOL/L (ref 133–144)
TRIGL SERPL-MCNC: 57 MG/DL

## 2018-08-01 PROCEDURE — 85610 PROTHROMBIN TIME: CPT | Performed by: PHYSICIAN ASSISTANT

## 2018-08-01 PROCEDURE — 99396 PREV VISIT EST AGE 40-64: CPT | Mod: 25 | Performed by: PHYSICIAN ASSISTANT

## 2018-08-01 PROCEDURE — 90715 TDAP VACCINE 7 YRS/> IM: CPT | Performed by: PHYSICIAN ASSISTANT

## 2018-08-01 PROCEDURE — 80053 COMPREHEN METABOLIC PANEL: CPT | Performed by: PHYSICIAN ASSISTANT

## 2018-08-01 PROCEDURE — 99207 ZZC NO CHARGE NURSE ONLY: CPT | Performed by: PHYSICIAN ASSISTANT

## 2018-08-01 PROCEDURE — 90471 IMMUNIZATION ADMIN: CPT | Performed by: PHYSICIAN ASSISTANT

## 2018-08-01 PROCEDURE — 80061 LIPID PANEL: CPT | Performed by: PHYSICIAN ASSISTANT

## 2018-08-01 PROCEDURE — 36415 COLL VENOUS BLD VENIPUNCTURE: CPT | Performed by: PHYSICIAN ASSISTANT

## 2018-08-01 RX ORDER — WARFARIN SODIUM 5 MG/1
TABLET ORAL
Qty: 180 TABLET | Refills: 1 | Status: SHIPPED | OUTPATIENT
Start: 2018-08-01 | End: 2018-10-10

## 2018-08-01 ASSESSMENT — ENCOUNTER SYMPTOMS
PALPITATIONS: 0
ABDOMINAL PAIN: 0
DIZZINESS: 0
HEMATURIA: 0
DYSURIA: 0
HEMATOCHEZIA: 0
NAUSEA: 0
CHILLS: 0
PARESTHESIAS: 0
WEAKNESS: 0
EYE PAIN: 0
SORE THROAT: 0
DIARRHEA: 0
COUGH: 0
SHORTNESS OF BREATH: 0
CONSTIPATION: 0

## 2018-08-01 NOTE — PROGRESS NOTES
SUBJECTIVE:   CC: Donato De Leon is an 48 year old male who presents for preventative health visit.     Physical   Annual:     Getting at least 3 servings of Calcium per day:  Yes    Bi-annual eye exam:  Yes    Dental care twice a year:  NO    Sleep apnea or symptoms of sleep apnea:  None    Diet:  Regular (no restrictions)    Frequency of exercise:  6-7 days/week    Duration of exercise:  30-45 minutes    Taking medications regularly:  Yes    Medication side effects:  Not applicable    Additional concerns today:  No    No concerns   Hx of colitis. See's GI.   Hx of gout. Controlled.   Hx of DVT. See's hematology    Today's PHQ-2 Score:   PHQ-2 ( 1999 Pfizer) 8/1/2018   Q1: Little interest or pleasure in doing things 0   Q2: Feeling down, depressed or hopeless 0   PHQ-2 Score 0   Q1: Little interest or pleasure in doing things Not at all   Q2: Feeling down, depressed or hopeless Not at all   PHQ-2 Score 0       Abuse: Current or Past(Physical, Sexual or Emotional)- No  Do you feel safe in your environment - Yes    Social History   Substance Use Topics     Smoking status: Never Smoker     Smokeless tobacco: Never Used     Alcohol use Yes      Comment: rare     Alcohol Use 8/1/2018   If you drink alcohol do you typically have greater than 3 drinks per day OR greater than 7 drinks per week? No     Last PSA: No results found for: PSA    Reviewed orders with patient. Reviewed health maintenance and updated orders accordingly - Yes  Labs reviewed in EPIC  BP Readings from Last 3 Encounters:   08/01/18 126/84   07/25/17 (!) 133/93   05/03/17 120/88    Wt Readings from Last 3 Encounters:   08/01/18 262 lb (118.8 kg)   07/25/17 264 lb 6.4 oz (119.9 kg)   03/29/17 248 lb (112.5 kg)                  Patient Active Problem List   Diagnosis     CARDIOVASCULAR SCREENING; LDL GOAL LESS THAN 160     Cellulitis of arm, right     Plantar fasciitis     Cough     Renal lithiasis     Gouty arthritis of toe of right foot     BMI  32.0-32.9,adult     Ulcerative colitis with complication, unspecified location (H)     Deep vein thrombosis (DVT) (H) [I82.409]     Long-term (current) use of anticoagulants [Z79.01]     Acute gout of right foot, unspecified cause     Deep vein thrombosis (DVT) of left lower extremity, unspecified chronicity, unspecified vein (H)     Past Surgical History:   Procedure Laterality Date     HC FRAGMENTING OF KIDNEY STONE       SKIN CANCER SCREENING      basal cell        Social History   Substance Use Topics     Smoking status: Never Smoker     Smokeless tobacco: Never Used     Alcohol use Yes      Comment: rare     Family History   Problem Relation Age of Onset     CLOTTING DISORDER Sister          Current Outpatient Prescriptions   Medication Sig Dispense Refill     warfarin (JANTOVEN) 5 MG tablet Take daily as directed. Current dose 10 mg daily 180 tablet 1     Acetaminophen (TYLENOL PO) Take by mouth as needed for mild pain or fever       [DISCONTINUED] warfarin (JANTOVEN) 5 MG tablet Take daily as directed. Current dose 10 mg daily 180 tablet 1     Allergies   Allergen Reactions     Nkda [No Known Drug Allergies]        Reviewed and updated as needed this visit by clinical staff  Tobacco  Allergies  Meds  Med Hx  Surg Hx  Fam Hx  Soc Hx        Reviewed and updated as needed this visit by Provider          Past Medical History:   Diagnosis Date     Colitis, ulcerative (H)      Renal lithiasis       Past Surgical History:   Procedure Laterality Date     HC FRAGMENTING OF KIDNEY STONE       SKIN CANCER SCREENING      basal cell        Review of Systems   Constitutional: Negative for chills.   HENT: Negative for congestion, ear pain and sore throat.    Eyes: Negative for pain and visual disturbance.   Respiratory: Negative for cough and shortness of breath.    Cardiovascular: Negative for chest pain and palpitations.   Gastrointestinal: Negative for abdominal pain, constipation, diarrhea, hematochezia and  "nausea.   Genitourinary: Negative for discharge, dysuria, hematuria, impotence and urgency.   Skin: Negative for rash.   Neurological: Negative for dizziness, weakness and paresthesias.     CONSTITUTIONAL: NEGATIVE for fever, chills, change in weight  INTEGUMENTARY/SKIN: NEGATIVE for worrisome rashes, moles or lesions  EYES: NEGATIVE for vision changes or irritation  ENT: NEGATIVE for ear, mouth and throat problems  RESP: NEGATIVE for significant cough or SOB  CV: NEGATIVE for chest pain, palpitations or peripheral edema  GI: NEGATIVE for nausea, abdominal pain, heartburn, or change in bowel habits   male: negative for dysuria, hematuria, decreased urinary stream, erectile dysfunction, urethral discharge  MUSCULOSKELETAL: NEGATIVE for significant arthralgias or myalgia  NEURO: NEGATIVE for weakness, dizziness or paresthesias  PSYCHIATRIC: NEGATIVE for changes in mood or affect    OBJECTIVE:   /84  Pulse 96  Temp 96.8  F (36  C) (Oral)  Resp 16  Ht 6' 1\" (1.854 m)  Wt 262 lb (118.8 kg)  SpO2 98%  BMI 34.57 kg/m2    Physical Exam  GENERAL: healthy, alert and no distress  EYES: Eyes grossly normal to inspection, PERRL and conjunctivae and sclerae normal  HENT: ear canals and TM's normal, nose and mouth without ulcers or lesions  NECK: no adenopathy, no asymmetry, masses, or scars and thyroid normal to palpation  RESP: lungs clear to auscultation - no rales, rhonchi or wheezes  CV: regular rate and rhythm, normal S1 S2, no S3 or S4, no murmur, click or rub, no peripheral edema and peripheral pulses strong  ABDOMEN: soft, nontender, no hepatosplenomegaly, no masses and bowel sounds normal   (male): normal male genitalia without lesions or urethral discharge, no hernia  MS: no gross musculoskeletal defects noted, no edema  SKIN: no suspicious lesions or rashes  NEURO: Normal strength and tone, mentation intact and speech normal  PSYCH: mentation appears normal, affect normal/bright    Diagnostic Test " "Results:  Results for orders placed or performed in visit on 08/01/18 (from the past 24 hour(s))   INR   Result Value Ref Range    INR 2.30 (H) 0.86 - 1.14       ASSESSMENT/PLAN:       ICD-10-CM    1. Routine general medical examination at a health care facility Z00.00 Lipid panel reflex to direct LDL Fasting     Comprehensive metabolic panel     TDAP, NICOLAS (10 - 64 YRS) - Adacel     ADMIN 1st VACCINE   2. Deep vein thrombosis (DVT) of left lower extremity, unspecified chronicity, unspecified vein (H) I82.402 warfarin (JANTOVEN) 5 MG tablet     INR   3. Long-term (current) use of anticoagulants [Z79.01] Z79.01 warfarin (JANTOVEN) 5 MG tablet     INR   4. Ulcerative colitis with complication, unspecified location (H) K51.919    5. Gouty arthritis of toe of right foot M10.9    Work on Healthy diet and exercise. Getting heart rate elevated for 30 mins most days of week.  Labs pending  warning signs discussed.  Keep fu's with specialists as scheduled.     COUNSELING:   Reviewed preventive health counseling, as reflected in patient instructions       Regular exercise       Healthy diet/nutrition    BP Readings from Last 1 Encounters:   08/01/18 126/84     Estimated body mass index is 34.57 kg/(m^2) as calculated from the following:    Height as of this encounter: 6' 1\" (1.854 m).    Weight as of this encounter: 262 lb (118.8 kg).    BP Screening:   Last 3 BP Readings:    BP Readings from Last 3 Encounters:   08/01/18 126/84   07/25/17 (!) 133/93   05/03/17 120/88       The following was recommended to the patient:  Re-screen BP within a year and recommended lifestyle modifications  Weight management plan: Discussed healthy diet and exercise guidelines and patient will follow up in 12 months in clinic to re-evaluate.     reports that he has never smoked. He has never used smokeless tobacco.      Counseling Resources:  ATP IV Guidelines  Pooled Cohorts Equation Calculator  FRAX Risk Assessment  ICSI Preventive " Guidelines  Dietary Guidelines for Americans, 2010  USDA's MyPlate  ASA Prophylaxis  Lung CA Screening    Carter Skinner PA-C  [facility]

## 2018-08-01 NOTE — MR AVS SNAPSHOT
After Visit Summary   8/1/2018    Donato De Leon    MRN: [id number]           Patient Information     Date Of Birth          1970        Visit Information        Provider Department      8/1/2018 8:40 AM Carter Skinner PA-C [facility]        Today's Diagnoses     Routine general medical examination at a health care facility    -  1    Deep vein thrombosis (DVT) of left lower extremity, unspecified chronicity, unspecified vein (H)        Long-term (current) use of anticoagulants [Z79.01]          Care Instructions      Preventive Health Recommendations  Male Ages 40 to 49    Yearly exam:             See your health care provider every year in order to  o   Review health changes.   o   Discuss preventive care.    o   Review your medicines if your doctor has prescribed any.    You should be tested each year for STDs (sexually transmitted diseases) if you re at risk.     Have a cholesterol test every 5 years.     Have a colonoscopy (test for colon cancer) if someone in your family has had colon cancer or polyps before age 50.     After age 45, have a diabetes test (fasting glucose). If you are at risk for diabetes, you should have this test every 3 years.      Talk with your health care provider about whether or not a prostate cancer screening test (PSA) is right for you.    Shots: Get a flu shot each year. Get a tetanus shot every 10 years.     Nutrition:    Eat at least 5 servings of fruits and vegetables daily.     Eat whole-grain bread, whole-wheat pasta and brown rice instead of white grains and rice.     Get adequate Calcium and Vitamin D.     Lifestyle    Exercise for at least 150 minutes a week (30 minutes a day, 5 days a week). This will help you control your weight and prevent disease.     Limit alcohol to one drink per day.     No smoking.     Wear sunscreen to prevent skin cancer.     See your dentist every six months for an exam and cleaning.               "Follow-ups after your visit        Who to contact     If you have questions or need follow up information about today's clinic visit or your schedule please contact [facility] directly at [phone].  Normal or non-critical lab and imaging results will be communicated to you by MyChart, letter or phone within 4 business days after the clinic has received the results. If you do not hear from us within 7 days, please contact the clinic through MyChart or phone. If you have a critical or abnormal lab result, we will notify you by phone as soon as possible.  Submit refill requests through [employer] or call your pharmacy and they will forward the refill request to us. Please allow 3 business days for your refill to be completed.          Additional Information About Your Visit        [facility] Information     [employer] gives you secure access to your electronic health record. If you see a primary care provider, you can also send messages to your care team and make appointments. If you have questions, please call your primary care clinic.  If you do not have a primary care provider, please call [phone] and they will assist you.        Care EveryWhere ID     This is your Care EveryWhere ID. This could be used by other organizations to access your [facility] medical records  [id number]        Your Vitals Were     Pulse Temperature Respirations Height Pulse Oximetry BMI (Body Mass Index)    96 96.8  F (36  C) (Oral) 16 6' 1\" (1.854 m) 98% 34.57 kg/m2       Blood Pressure from Last 3 Encounters:   08/01/18 126/84   07/25/17 (!) 133/93   05/03/17 120/88    Weight from Last 3 Encounters:   08/01/18 262 lb (118.8 kg)   07/25/17 264 lb 6.4 oz (119.9 kg)   03/29/17 248 lb (112.5 kg)              We Performed the Following     Comprehensive metabolic panel     INR     Lipid panel reflex to direct LDL Fasting          Where to get your medicines      [place] [facility] [place] - [address] [address]  [address] [address], [facility] [address]     Phone:  [phone]     warfarin 5 MG tablet          Primary Care Provider Office Phone # Fax #    Carter Skinner PA-C [phone] [address] [address] [address]        Equal Access to Services     [facility] HospitalLESLIE : Hadii aad ku hadasho Soomaali, waaxda luqadaha, qaybta kaalmada adeegyada, waxay idiin hayaan adeeg kharash la'aan . So [facility] [phone].    ATENCIÓN: Si habla espchristopher, tiene a barnes disposición servicios gratuitos de asistencia lingüística. Llame al [phone].    We comply with applicable federal civil rights laws and [place] laws. We do not discriminate on the basis of race, color, national origin, age, disability, sex, sexual orientation, or gender identity.            Thank you!     Thank you for choosing [facility]  for your care. Our goal is always to provide you with excellent care. Hearing back from our patients is one way we can continue to improve our services. Please take a few minutes to complete the written survey that you may receive in the mail after your visit with us. Thank you!             Your Updated Medication List - Protect others around you: Learn how to safely use, store and throw away your medicines at www.disposemymeds.org.          This list is accurate as of 8/1/18  9:12 AM.  Always use your most recent med list.                   Brand Name Dispense Instructions for use Diagnosis    TYLENOL PO      Take by mouth as needed for mild pain or fever        warfarin 5 MG tablet    JANTOVEN    180 tablet    Take daily as directed. Current dose 10 mg daily    Deep vein thrombosis (DVT) of left lower extremity, unspecified chronicity, unspecified vein (H), Long-term (current) use of anticoagulants

## 2018-08-01 NOTE — NURSING NOTE
Screening Questionnaire for Adult Immunization    Are you sick today?   No   Do you have allergies to medications, food, a vaccine component or latex?   No   Have you ever had a serious reaction after receiving a vaccination?   No   Do you have a long-term health problem with heart disease, lung disease, asthma, kidney disease, metabolic disease (e.g. diabetes), anemia, or other blood disorder?   No   Do you have cancer, leukemia, HIV/AIDS, or any other immune system problem?   No   In the past 3 months, have you taken medications that affect  your immune system, such as prednisone, other steroids, or anticancer drugs; drugs for the treatment of rheumatoid arthritis, Crohn s disease, or psoriasis; or have you had radiation treatments?   No   Have you had a seizure, or a brain or other nervous system problem?   No   During the past year, have you received a transfusion of blood or blood     products, or been given immune (gamma) globulin or antiviral drug?   No   For women: Are you pregnant or is there a chance you could become        pregnant during the next month?   No   Have you received any vaccinations in the past 4 weeks?   No     Immunization questionnaire answers were all negative.        Per orders of Carter Skinner, injection of Adacel given by Jenelle Jeter. Patient instructed to remain in clinic for 15 minutes afterwards, and to report any adverse reaction to me immediately.       Screening performed by Jenelle Jeter on 8/1/2018 at 9:23 AM.

## 2018-08-01 NOTE — LETTER
August 1, 2018    Donato De Leon  [address]  [place] [address]        Mr. De Leon,    All of your labs were normal for you.  Your liver function tests were abnormal.  This can be due to medications, alcohol or an infection.  In your case I suggest that we recheck your liver function tests in 8 weeks to make sure nothing else is going on.  Please call for a lab only appointment at that time.    Please contact the clinic if you have additional questions.  Thank you.    Sincerely,    Carter Skinner PA-C     Results for orders placed or performed in visit on 08/01/18   Lipid panel reflex to direct LDL Fasting   Result Value Ref Range    Cholesterol 228 (H) <200 mg/dL    Triglycerides 57 <150 mg/dL    HDL Cholesterol 92 >39 mg/dL    LDL Cholesterol Calculated 125 (H) <100 mg/dL    Non HDL Cholesterol 136 (H) <130 mg/dL   Comprehensive metabolic panel   Result Value Ref Range    Sodium 139 133 - 144 mmol/L    Potassium 4.5 3.4 - 5.3 mmol/L    Chloride 105 94 - 109 mmol/L    Carbon Dioxide 26 20 - 32 mmol/L    Anion Gap 8 3 - 14 mmol/L    Glucose 85 70 - 99 mg/dL    Urea Nitrogen 20 7 - 30 mg/dL    Creatinine 1.16 0.66 - 1.25 mg/dL    GFR Estimate 67 >60 mL/min/1.7m2    GFR Estimate If Black 81 >60 mL/min/1.7m2    Calcium 9.1 8.5 - 10.1 mg/dL    Bilirubin Total 0.9 0.2 - 1.3 mg/dL    Albumin 4.0 3.4 - 5.0 g/dL    Protein Total 7.8 6.8 - 8.8 g/dL    Alkaline Phosphatase 53 40 - 150 U/L    ALT 72 (H) 0 - 70 U/L    AST 54 (H) 0 - 45 U/L   INR   Result Value Ref Range    INR 2.30 (H) 0.86 - 1.14

## 2018-08-01 NOTE — PROGRESS NOTES
ANTICOAGULATION FOLLOW-UP CLINIC VISIT    Patient Name:  Donato De Leon  Date:  8/1/2018  Contact Type:  Telephone    SUBJECTIVE:     Patient Findings     Comments See in clinic today by provider for physical and had INR done with other labs. Call attempt to patient with results and left message on personal voicemail with results and to call me back if questions or changes.            OBJECTIVE    INR   Date Value Ref Range Status   08/01/2018 2.30 (H) 0.86 - 1.14 Final     Comment:     This test is intended for monitoring Coumadin therapy.  Results are not   accurate in patients with prolonged INR due to factor deficiency.         ASSESSMENT / PLAN  INR assessment THER    Recheck INR In: 4 WEEKS    INR Location Clinic      Anticoagulation Summary as of 8/1/2018     INR goal 2.0-3.0   Today's INR 2.30   Warfarin maintenance plan 10 mg (5 mg x 2) every day   Full warfarin instructions 10 mg every day   Weekly warfarin total 70 mg   No change documented Lyudmila Good RN   Plan last modified Lyudmila Good RN (10/26/2017)   Next INR check 9/5/2018   Target end date     Indications   Deep vein thrombosis (DVT) (H) [I82.409] [I82.409]  Long-term (current) use of anticoagulants [Z79.01] [Z79.01]         Anticoagulation Episode Summary     INR check location     Preferred lab     Send INR reminders to AN ANTICOAG CLINIC    Comments       Anticoagulation Care Providers     Provider Role Specialty Phone number    Carter Skinner PA-C [facility] [phone]            See the Encounter Report to view Anticoagulation Flowsheet and Dosing Calendar (Go to Encounters tab in chart review, and find the Anticoagulation Therapy Visit)        Lyudmila Good RN

## 2018-08-01 NOTE — MR AVS SNAPSHOT
Donato De Leon   8/1/2018   Anticoagulation Therapy Visit    Description:  48 year old male   Provider:  Carter Skinner PA-C   [facility]:  [facility]           INR as of 8/1/2018     Today's INR 2.30      Anticoagulation Summary as of 8/1/2018     INR goal 2.0-3.0   Today's INR 2.30   Full warfarin instructions 10 mg every day   Next INR check 9/5/2018    Indications   Deep vein thrombosis (DVT) (H) [I82.409] [I82.409]  Long-term (current) use of anticoagulants [Z79.01] [Z79.01]         August 2018 Details    Sun Mon Tue Wed Thu Fri Sat        1      10 mg   See details      2      10 mg         3      10 mg         4      10 mg           5      10 mg         6      10 mg         7      10 mg         8      10 mg         9      10 mg         10      10 mg         11      10 mg           12      10 mg         13      10 mg         14      10 mg         15      10 mg         16      10 mg         17      10 mg         18      10 mg           19      10 mg         20      10 mg         21      10 mg         22      10 mg         23      10 mg         24      10 mg         25      10 mg           26      10 mg         27      10 mg         28      10 mg         29      10 mg         30      10 mg         31      10 mg           Date Details   08/01 This INR check               How to take your warfarin dose     To take:  10 mg Take 2 of the 5 mg tablets.           September 2018 Details    Sun Mon Tue Wed Thu Fri Sat           1      10 mg           2      10 mg         3      10 mg         4      10 mg         5            6               7               8                 9               10               11               12               13               14               15                 16               17               18               19               20               21               22                 23               24               25               26               27               28                29                 30                      Date Details   No additional details    Date of next INR:  9/5/2018         How to take your warfarin dose     To take:  10 mg Take 2 of the 5 mg tablets.

## 2018-08-01 NOTE — NURSING NOTE
"Chief Complaint   Patient presents with     Physical     Health Maintenance     HIV, Tetanus       Initial /84  Pulse 96  Temp 96.8  F (36  C) (Oral)  Resp 16  Ht 6' 1\" (1.854 m)  Wt 262 lb (118.8 kg)  SpO2 98%  BMI 34.57 kg/m2 Estimated body mass index is 34.57 kg/(m^2) as calculated from the following:    Height as of this encounter: 6' 1\" (1.854 m).    Weight as of this encounter: 262 lb (118.8 kg).  Medication Reconciliation: complete  Mindi Stanley CMA    "

## 2018-09-04 ENCOUNTER — MEDICAL CORRESPONDENCE (OUTPATIENT)
Dept: HEALTH INFORMATION MANAGEMENT | Facility: CLINIC | Age: 48
End: 2018-09-04

## 2018-09-06 ENCOUNTER — TELEPHONE (OUTPATIENT)
Dept: FAMILY MEDICINE | Facility: CLINIC | Age: 48
End: 2018-09-06

## 2018-09-06 NOTE — TELEPHONE ENCOUNTER
This is just a FYI.  Danielle from  case management is calling to let you know that she was not able to connect with the patient to see if he needs support.  If you have any question, please feel free to call her.  Thank you

## 2018-09-21 ENCOUNTER — TELEPHONE (OUTPATIENT)
Dept: FAMILY MEDICINE | Facility: CLINIC | Age: 48
End: 2018-09-21

## 2018-09-21 NOTE — TELEPHONE ENCOUNTER
Patient overdue for INR. Left message on personal voicemail to call back and schedule INR. Lyudmila Good RN

## 2018-09-24 NOTE — TELEPHONE ENCOUNTER
Left message on answering machine for patient to call back to schedule INR appointment.. Danielle SEARS RN

## 2018-09-27 ENCOUNTER — ANTICOAGULATION THERAPY VISIT (OUTPATIENT)
Dept: NURSING | Facility: CLINIC | Age: 48
End: 2018-09-27
Payer: COMMERCIAL

## 2018-09-27 ENCOUNTER — TRANSFERRED RECORDS (OUTPATIENT)
Dept: HEALTH INFORMATION MANAGEMENT | Facility: CLINIC | Age: 48
End: 2018-09-27

## 2018-09-27 DIAGNOSIS — Z79.01 LONG-TERM (CURRENT) USE OF ANTICOAGULANTS: ICD-10-CM

## 2018-09-27 DIAGNOSIS — I82.402 DEEP VEIN THROMBOSIS (DVT) OF LEFT LOWER EXTREMITY, UNSPECIFIED CHRONICITY, UNSPECIFIED VEIN (H): ICD-10-CM

## 2018-09-27 LAB — INR POINT OF CARE: 3.2 (ref 0.86–1.14)

## 2018-09-27 PROCEDURE — 36416 COLLJ CAPILLARY BLOOD SPEC: CPT

## 2018-09-27 PROCEDURE — 99207 ZZC NO CHARGE NURSE ONLY: CPT

## 2018-09-27 PROCEDURE — 85610 PROTHROMBIN TIME: CPT | Mod: QW

## 2018-09-27 NOTE — PROGRESS NOTES
ANTICOAGULATION FOLLOW-UP CLINIC VISIT    Patient Name:  Donato De Leon  Date:  9/27/2018  Contact Type:  Face to Face    SUBJECTIVE:     Patient Findings     Positives No Problem Findings    Comments INR 3.2. No bleeding, no bruising. No change in meds. No illness. No colitis flares and no blood in stools. Busy work schedule with travel and next apt he requests 5 wks. Discussed concerns with bleeding if has colitis flare and advised if any bleeding to call or if any changes or concerns to call. He agrees.            OBJECTIVE    INR Protime   Date Value Ref Range Status   09/27/2018 3.2 (A) 0.86 - 1.14 Final       ASSESSMENT / PLAN  INR assessment SUPRA    Recheck INR In: 5 WEEKS    INR Location Clinic      Anticoagulation Summary as of 9/27/2018     INR goal 2.0-3.0   Today's INR 3.2!   Warfarin maintenance plan 10 mg (5 mg x 2) every day   Full warfarin instructions 9/28: 5 mg; Otherwise 10 mg every day   Weekly warfarin total 70 mg   Plan last modified Lyudmila Good RN (10/26/2017)   Next INR check 10/30/2018   Target end date     Indications   Deep vein thrombosis (DVT) (H) [I82.409] [I82.409]  Long-term (current) use of anticoagulants [Z79.01] [Z79.01]         Anticoagulation Episode Summary     INR check location     Preferred lab     Send INR reminders to [facility]    Comments       Anticoagulation Care Providers     Provider Role Specialty Phone number    Carter Skinner PA-C [facility] [phone]            See the Encounter Report to view Anticoagulation Flowsheet and Dosing Calendar (Go to Encounters tab in chart review, and find the Anticoagulation Therapy Visit)        Lyudmila Good RN

## 2018-09-27 NOTE — MR AVS SNAPSHOT
Donato De Leon   9/27/2018 7:45 AM   Anticoagulation Therapy Visit    Description:  48 year old male   Provider:  AN ANTI [facility]AG   Department:  [facility]           INR as of 9/27/2018     Today's INR 3.2!      Anticoagulation Summary as of 9/27/2018     INR goal 2.0-3.0   Today's INR 3.2!   Full warfarin instructions 9/28: 5 mg; Otherwise 10 mg every day   Next INR check 10/30/2018    Indications   Deep vein thrombosis (DVT) (H) [I82.409] [I82.409]  Long-term (current) use of anticoagulants [Z79.01] [Z79.01]         Your next Anticoagulation Clinic appointment(s)     Oct 30, 2018  8:45 AM CDT   Anticoagulation Visit with AN ANTI COAG   [facility] ([facility])    [address] [facility] [address] [phone]              Contact Numbers     [facility]  Please call  [phone] to cancel and/or reschedule your appointment, or with any problems or questions regarding your therapy.        September 2018 Details    Sun Mon Tue Wed Thu Fri Sat           1                 2               3               4               5               6               7               8                 9               10               11               12               13               14               15                 16               17               18               19               20               21               22                 23               24               25               26               27      10 mg   See details      28      5 mg         29      10 mg           30      10 mg                Date Details   09/27 This INR check               How to take your warfarin dose     To take:  5 mg Take 1 of the 5 mg tablets.    To take:  10 mg Take 2 of the 5 mg tablets.           October 2018 Details    Sun Mon Tue Wed Thu Fri Sat      1      10 mg         2      10 mg         3      10 mg         4      10 mg         5      10 mg         6      10 mg           7      10 mg          8      10 mg         9      10 mg         10      10 mg         11      10 mg         12      10 mg         13      10 mg           14      10 mg         15      10 mg         16      10 mg         17      10 mg         18      10 mg         19      10 mg         20      10 mg           21      10 mg         22      10 mg         23      10 mg         24      10 mg         25      10 mg         26      10 mg         27      10 mg           28      10 mg         29      10 mg         30            31                   Date Details   No additional details    Date of next INR:  10/30/2018         How to take your warfarin dose     To take:  10 mg Take 2 of the 5 mg tablets.

## 2018-10-10 ENCOUNTER — RADIANT APPOINTMENT (OUTPATIENT)
Dept: GENERAL RADIOLOGY | Facility: CLINIC | Age: 48
End: 2018-10-10
Attending: PHYSICIAN ASSISTANT
Payer: COMMERCIAL

## 2018-10-10 ENCOUNTER — OFFICE VISIT (OUTPATIENT)
Dept: FAMILY MEDICINE | Facility: CLINIC | Age: 48
End: 2018-10-10
Payer: COMMERCIAL

## 2018-10-10 VITALS
BODY MASS INDEX: 33.4 KG/M2 | TEMPERATURE: 97.3 F | WEIGHT: 252 LBS | SYSTOLIC BLOOD PRESSURE: 136 MMHG | OXYGEN SATURATION: 99 % | DIASTOLIC BLOOD PRESSURE: 84 MMHG | HEIGHT: 73 IN | HEART RATE: 78 BPM

## 2018-10-10 DIAGNOSIS — M25.532 LEFT WRIST PAIN: Primary | ICD-10-CM

## 2018-10-10 DIAGNOSIS — M25.532 LEFT WRIST PAIN: ICD-10-CM

## 2018-10-10 DIAGNOSIS — I82.402 DEEP VEIN THROMBOSIS (DVT) OF LEFT LOWER EXTREMITY, UNSPECIFIED CHRONICITY, UNSPECIFIED VEIN (H): ICD-10-CM

## 2018-10-10 PROCEDURE — 73110 X-RAY EXAM OF WRIST: CPT | Mod: LT

## 2018-10-10 PROCEDURE — 99213 OFFICE O/P EST LOW 20 MIN: CPT | Performed by: PHYSICIAN ASSISTANT

## 2018-10-10 RX ORDER — WARFARIN SODIUM 5 MG/1
TABLET ORAL
Qty: 180 TABLET | Refills: 1 | Status: SHIPPED | OUTPATIENT
Start: 2018-10-10 | End: 2019-01-17

## 2018-10-10 NOTE — PATIENT INSTRUCTIONS
ice or cold packs 20 minutes every 2-3 hrs as needed to relieve pain and swelling, for the first 2 days. Then can apply heat 20 minutes every 2-3 hrs (avoid sleeping on heating pad) there after as needed.   Tylenol can help with pain also.    Active range of motion exercises encouraged  Activity modification trying to avoid activities that cause you pain.   Follow up 2-4 wks as needed     Carter Skinner PA-C

## 2018-10-10 NOTE — PROGRESS NOTES
SUBJECTIVE:   Donato De Leon is a 48 year old male who presents to clinic today for the following health issues:  Donato De Leon is a 48 year old male seen today who  has a past medical history of Acute gout of right foot, unspecified cause (5/3/2017); Colitis, ulcerative (H); Deep vein thrombosis (DVT) of left lower extremity, unspecified chronicity, unspecified vein (H) (7/11/2017); Long-term (current) use of anticoagulants [Z79.01] (3/29/2017); and Renal lithiasis.   who presents to clinic today for the following health issues:     Musculoskeletal problem/pain      Duration: Lifting weights 8 weeks ago, golfing 3 weeks ago, pt rolled wrist during golf swing and aggravated left wrist.     Description  Location: Left wrist pain    Intensity:  4/10, worse over time    Accompanying signs and symptoms: none    History  Previous similar problem: no   Previous evaluation:  none    Precipitating or alleviating factors:  Trauma or overuse: no   Aggravating factors include: motion and pressure.    Therapies tried and outcome: Ice, asper cream, tiger balm with no relief.       Pain with palpation of 2 metacarpal radiates to radius. Pain with supination. Pain with flexion and medial deviation with resistance.      Problem list and histories reviewed & adjusted, as indicated.  Additional history: as documented    Patient Active Problem List   Diagnosis     CARDIOVASCULAR SCREENING; LDL GOAL LESS THAN 160     Cellulitis of arm, right     Plantar fasciitis     Cough     Renal lithiasis     Gouty arthritis of toe of right foot     BMI 32.0-32.9,adult     Ulcerative colitis with complication, unspecified location (H)     Deep vein thrombosis (DVT) (H) [I82.409]     Long-term (current) use of anticoagulants [Z79.01]     Acute gout of right foot, unspecified cause     Deep vein thrombosis (DVT) of left lower extremity, unspecified chronicity, unspecified vein (H)     Elevated LFTs     Past Surgical History:   Procedure  "Laterality Date     HC FRAGMENTING OF KIDNEY STONE       SKIN CANCER SCREENING      basal cell        Social History   Substance Use Topics     Smoking status: Never Smoker     Smokeless tobacco: Never Used     Alcohol use Yes      Comment: rare     Family History   Problem Relation Age of Onset     CLOTTING DISORDER Sister          Current Outpatient Prescriptions   Medication Sig Dispense Refill     Acetaminophen (TYLENOL PO) Take by mouth as needed for mild pain or fever       warfarin (JANTOVEN) 5 MG tablet Take daily as directed. Current dose 10 mg daily 180 tablet 1     [DISCONTINUED] warfarin (JANTOVEN) 5 MG tablet Take daily as directed. Current dose 10 mg daily 180 tablet 1     Allergies   Allergen Reactions     Nkda [No Known Drug Allergies]        Reviewed and updated as needed this visit by clinical staff  Tobacco  Allergies  Meds  Problems  Med Hx  Surg Hx  Fam Hx  Soc Hx        Reviewed and updated as needed this visit by Provider  Allergies  Meds  Problems         OBJECTIVE:     /84  Pulse 78  Temp 97.3  F (36.3  C) (Oral)  Ht 6' 1\" (1.854 m)  Wt 252 lb (114.3 kg)  SpO2 99%  BMI 33.25 kg/m2  Body mass index is 33.25 kg/(m^2).  GENERAL: healthy, alert and no distress  Wrist Exam: WRIST:  Inspection: no swelling  no effusion  Palpation: Tender: lunate  Non-tender: distal radius, distal ulna, TFCC, snuff box  Range of Motion: flexion:  full, painful, extension:  full, painful, radial deviation: full, ulnar deviation: full  Strength: no deficits  Special tests: negative Tinel's at carpal tunnel.        Diagnostic Test Results:  Xray - left wrist. neg. pending radiology     ASSESSMENT/PLAN:         ICD-10-CM    1. Left wrist pain M25.532 XR Wrist Left G/E 3 Views   2. Deep vein thrombosis (DVT) of left lower extremity, unspecified chronicity, unspecified vein (H) I82.402 warfarin (JANTOVEN) 5 MG tablet   1. Suspect over use. Brace applied for 2 wks. Recheck 2 wks. warning signs " discussed. side effects discussed. Active range of motion exercises encouraged  Activity modification.  2. Avoid nsaids and use only tylenol. Ice or heat 20 mins every 2-3 hrs as needed.   Follow up  With ortho if con't pain after 2-4 wks.     Carter Skinner PA-C  [facility]

## 2018-10-10 NOTE — MR AVS SNAPSHOT
After Visit Summary   10/10/2018    Donato De Leon    MRN: [id number]           Patient Information     Date Of Birth          1970        Visit Information        Provider Department      10/10/2018 9:40 AM Carter Skinner PA-C [facility]        Today's Diagnoses     Left wrist pain    -  1    Deep vein thrombosis (DVT) of left lower extremity, unspecified chronicity, unspecified vein (H)          Care Instructions    ice or cold packs 20 minutes every 2-3 hrs as needed to relieve pain and swelling, for the first 2 days. Then can apply heat 20 minutes every 2-3 hrs (avoid sleeping on heating pad) there after as needed.   Tylenol can help with pain also.    Active range of motion exercises encouraged  Activity modification trying to avoid activities that cause you pain.   Follow up 2-4 wks as needed     Carter Skinner PA-C              Follow-ups after your visit        Follow-up notes from your care team     Return in about 2 weeks (around 10/24/2018) for As Needed.      Your next 10 appointments already scheduled     Oct 30, 2018  8:45 AM CDT   Anticoagulation Visit with AN ANTI COAG   [facility] ([facility])    [address] [facility] [address] [phone]              Who to contact     If you have questions or need follow up information about today's clinic visit or your schedule please contact [facility] directly at [phone].  Normal or non-critical lab and imaging results will be communicated to you by MyChart, letter or phone within 4 business days after the clinic has received the results. If you do not hear from us within 7 days, please contact the clinic through MyChart or phone. If you have a critical or abnormal lab result, we will notify you by phone as soon as possible.  Submit refill requests through [employer] or call your pharmacy and they will forward the refill request to us. Please allow 3 business  "days for your refill to be completed.          Additional Information About Your Visit        MyChart Information     [employer] gives you secure access to your electronic health record. If you see a primary care provider, you can also send messages to your care team and make appointments. If you have questions, please call your primary care clinic.  If you do not have a primary care provider, please call [phone] and they will assist you.        Care EveryWhere ID     This is your Care EveryWhere ID. This could be used by other organizations to access your [facility] medical records  [id number]        Your Vitals Were     Pulse Temperature Height Pulse Oximetry BMI (Body Mass Index)       78 97.3  F (36.3  C) (Oral) 6' 1\" (1.854 m) 99% 33.25 kg/m2        Blood Pressure from Last 3 Encounters:   10/10/18 136/84   08/01/18 126/84   07/25/17 (!) 133/93    Weight from Last 3 Encounters:   10/10/18 252 lb (114.3 kg)   08/01/18 262 lb (118.8 kg)   07/25/17 264 lb 6.4 oz (119.9 kg)                 Where to get your medicines      These medications were sent to [facility] [address], [facility] [place] [address]  [id number] [place] [address]     Phone:  [phone]     warfarin 5 MG tablet          Primary Care Provider Office Phone # Fax #    Carter Skinner PA-C [phone] [address] [facility] [address]        Equal Access to Services     МАРИНА DUMONT : Hadii saba onealo Somahamed, waaxda luqadaha, qaybta kaalmada jorge arevalo. So [facility] [phone].    ATENCIÓN: Si habla español, tiene a barnes disposición servicios gratuitos de asistencia lingüística. Llame al [phone].    We comply with applicable federal civil rights laws and [place] laws. We do not discriminate on the basis of race, color, national origin, age, disability, sex, sexual orientation, or gender identity.            Thank you!     Thank you " for choosing [facility]  [facility]. Our goal is always to provide you with excellent care. Hearing back from our patients is one way we can continue to improve our services. Please take a few minutes to complete the written survey that you may receive in the mail after your visit with us. Thank you!             Your Updated Medication List - Protect others around you: Learn how to safely use, store and throw away your medicines at www.disposemymeds.org.          This list is accurate as of 10/10/18 10:33 AM.  Always use your most recent med list.                   Brand Name Dispense Instructions for use Diagnosis    TYLENOL PO      Take by mouth as needed for mild pain or fever        warfarin 5 MG tablet    JANTOVEN    180 tablet    Take daily as directed. Current dose 10 mg daily    Deep vein thrombosis (DVT) of left lower extremity, unspecified chronicity, unspecified vein (H)

## 2018-10-11 NOTE — PROGRESS NOTES
Mr. De Leon,    X-ray is normal.     Please contact the clinic if you have additional questions.  Thank you.    Sincerely,    Carter Skinner PA-C

## 2018-10-26 ENCOUNTER — MEDICAL CORRESPONDENCE (OUTPATIENT)
Dept: HEALTH INFORMATION MANAGEMENT | Facility: CLINIC | Age: 48
End: 2018-10-26

## 2018-10-26 ENCOUNTER — TRANSFERRED RECORDS (OUTPATIENT)
Dept: HEALTH INFORMATION MANAGEMENT | Facility: CLINIC | Age: 48
End: 2018-10-26

## 2018-11-06 ENCOUNTER — ANTICOAGULATION THERAPY VISIT (OUTPATIENT)
Dept: NURSING | Facility: CLINIC | Age: 48
End: 2018-11-06
Payer: COMMERCIAL

## 2018-11-06 DIAGNOSIS — I82.402 DEEP VEIN THROMBOSIS (DVT) OF LEFT LOWER EXTREMITY, UNSPECIFIED CHRONICITY, UNSPECIFIED VEIN (H): ICD-10-CM

## 2018-11-06 LAB — INR POINT OF CARE: 2 (ref 0.86–1.14)

## 2018-11-06 PROCEDURE — 85610 PROTHROMBIN TIME: CPT | Mod: QW

## 2018-11-06 PROCEDURE — 36416 COLLJ CAPILLARY BLOOD SPEC: CPT

## 2018-11-06 PROCEDURE — 99207 ZZC NO CHARGE NURSE ONLY: CPT

## 2018-11-06 NOTE — PROGRESS NOTES
ANTICOAGULATION FOLLOW-UP CLINIC VISIT    Patient Name:  Donato De Leon  Date:  11/6/2018  Contact Type:  Face to Face    SUBJECTIVE:     Patient Findings     Comments Missed last INR apt as he was out of town. Had colitis flare about 2 wks ago and on 2 nd day started having blood in stools. He stopped the warfarin at that time as he has done in the past. Had Remicade in fusion on 10/26 at [facility] and stated his symptoms improved within 2 days. He then resumed the coumadin at his usual dose. No further bleeding.            OBJECTIVE    INR Protime   Date Value Ref Range Status   11/06/2018 2.0 (A) 0.86 - 1.14 Final       ASSESSMENT / PLAN  INR assessment THER    Recheck INR In: 1 WEEK    INR Location Clinic      Anticoagulation Summary as of 11/6/2018     INR goal 2.0-3.0   Today's INR 2.0   Warfarin maintenance plan 10 mg (5 mg x 2) every day   Full warfarin instructions 10 mg every day   Weekly warfarin total 70 mg   Plan last modified Lyudmila Good RN (10/26/2017)   Next INR check 12/11/2018   Target end date     Indications   Deep vein thrombosis (DVT) (H) [I82.409] [I82.409]  Long-term (current) use of anticoagulants [Z79.01] [Z79.01]         Anticoagulation Episode Summary     INR check location     Preferred lab     Send INR reminders to [facility]    Comments       Anticoagulation Care Providers     Provider Role Specialty Phone number    Carter Skinner PA-C [facility] [phone]            See the Encounter Report to view Anticoagulation Flowsheet and Dosing Calendar (Go to Encounters tab in chart review, and find the Anticoagulation Therapy Visit)        Lyudmila Good RN

## 2018-11-06 NOTE — MR AVS SNAPSHOT
Donato De Leon   11/6/2018 11:15 AM   Anticoagulation Therapy Visit    Description:  48 year old male   Provider:  AN ANTI COAG   Department:  [facility] Nurse           INR as of 11/6/2018     Today's INR 2.0      Anticoagulation Summary as of 11/6/2018     INR goal 2.0-3.0   Today's INR 2.0   Full warfarin instructions 10 mg every day   Next INR check 12/11/2018    Indications   Deep vein thrombosis (DVT) (H) [I82.409] [I82.409]  Long-term (current) use of anticoagulants [Z79.01] [Z79.01]         Your next Anticoagulation Clinic appointment(s)     Dec 11, 2018  8:15 AM CST   Anticoagulation Visit with AN ANTI COAG   [facility] ([facility])    [address] [facility] [address] [phone]              Contact Numbers     [facility]  Please call  [phone] to cancel and/or reschedule your appointment, or with any problems or questions regarding your therapy.        November 2018 Details    Sun Mon Tue Wed Thu Fri Sat         1               2               3                 4               5               6      10 mg   See details      7      10 mg         8      10 mg         9      10 mg         10      10 mg           11      10 mg         12      10 mg         13      10 mg         14      10 mg         15      10 mg         16      10 mg         17      10 mg           18      10 mg         19      10 mg         20      10 mg         21      10 mg         22      10 mg         23      10 mg         24      10 mg           25      10 mg         26      10 mg         27      10 mg         28      10 mg         29      10 mg         30      10 mg           Date Details   11/06 This INR check               How to take your warfarin dose     To take:  10 mg Take 2 of the 5 mg tablets.           December 2018 Details    Sun Mon Tue Wed Thu Fri Sat           1      10 mg           2      10 mg         3      10 mg         4      10 mg         5      10 mg         6       10 mg         7      10 mg         8      10 mg           9      10 mg         10      10 mg         11            12               13               14               15                 16               17               18               19               20               21               22                 23               24               25               26               27               28               29                 30               31                     Date Details   No additional details    Date of next INR:  12/11/2018         How to take your warfarin dose     To take:  10 mg Take 2 of the 5 mg tablets.

## 2018-12-11 ENCOUNTER — ANTICOAGULATION THERAPY VISIT (OUTPATIENT)
Dept: NURSING | Facility: CLINIC | Age: 48
End: 2018-12-11
Payer: COMMERCIAL

## 2018-12-11 DIAGNOSIS — I82.402 DEEP VEIN THROMBOSIS (DVT) OF LEFT LOWER EXTREMITY, UNSPECIFIED CHRONICITY, UNSPECIFIED VEIN (H): ICD-10-CM

## 2018-12-11 LAB — INR POINT OF CARE: 2.3 (ref 0.86–1.14)

## 2018-12-11 PROCEDURE — 99207 ZZC NO CHARGE NURSE ONLY: CPT

## 2018-12-11 PROCEDURE — 36416 COLLJ CAPILLARY BLOOD SPEC: CPT

## 2018-12-11 PROCEDURE — 85610 PROTHROMBIN TIME: CPT | Mod: QW

## 2018-12-11 NOTE — PROGRESS NOTES
ANTICOAGULATION FOLLOW-UP CLINIC VISIT    Patient Name:  Donato De Leon  Date:  2018  Contact Type:  Face to Face    SUBJECTIVE:     Patient Findings     Positives:   No Problem Findings           OBJECTIVE    INR Protime   Date Value Ref Range Status   2018 2.3 (A) 0.86 - 1.14 Final       ASSESSMENT / PLAN  INR assessment THER    Recheck INR In: 5 WEEKS    INR Location Clinic      Anticoagulation Summary  As of 2018    INR goal:   2.0-3.0   TTR:   59.7 % (1.7 y)   INR used for dosin.3 (2018)   Warfarin maintenance plan:   10 mg (5 mg x 2) every day   Full warfarin instructions:   10 mg every day   Weekly warfarin total:   70 mg   No change documented:   Lyudmila Good RN   Plan last modified:   Lyudmila Good RN (10/26/2017)   Next INR check:   2019   Target end date:       Indications    Deep vein thrombosis (DVT) (H) [I82.409] [I82.409]  Long-term (current) use of anticoagulants [Z79.01] [Z79.01]             Anticoagulation Episode Summary     INR check location:       Preferred lab:       Send INR reminders to:   [facility]    Comments:         Anticoagulation Care Providers     Provider Role Specialty Phone number    Oppel, Carter Romo PA-C [facility] [phone]            See the Encounter Report to view Anticoagulation Flowsheet and Dosing Calendar (Go to Encounters tab in chart review, and find the Anticoagulation Therapy Visit)        Lyudmila Good RN

## 2018-12-21 ENCOUNTER — MEDICAL CORRESPONDENCE (OUTPATIENT)
Dept: HEALTH INFORMATION MANAGEMENT | Facility: CLINIC | Age: 48
End: 2018-12-21

## 2018-12-21 ENCOUNTER — TRANSFERRED RECORDS (OUTPATIENT)
Dept: HEALTH INFORMATION MANAGEMENT | Facility: CLINIC | Age: 48
End: 2018-12-21

## 2019-01-17 ENCOUNTER — ANTICOAGULATION THERAPY VISIT (OUTPATIENT)
Dept: NURSING | Facility: CLINIC | Age: 49
End: 2019-01-17
Payer: COMMERCIAL

## 2019-01-17 DIAGNOSIS — I82.402 DEEP VEIN THROMBOSIS (DVT) OF LEFT LOWER EXTREMITY, UNSPECIFIED CHRONICITY, UNSPECIFIED VEIN (H): ICD-10-CM

## 2019-01-17 LAB — INR POINT OF CARE: 2.5 (ref 0.86–1.14)

## 2019-01-17 PROCEDURE — 99207 ZZC NO CHARGE NURSE ONLY: CPT

## 2019-01-17 PROCEDURE — 85610 PROTHROMBIN TIME: CPT | Mod: QW

## 2019-01-17 PROCEDURE — 36416 COLLJ CAPILLARY BLOOD SPEC: CPT

## 2019-01-17 RX ORDER — WARFARIN SODIUM 5 MG/1
TABLET ORAL
Qty: 180 TABLET | Refills: 0 | Status: SHIPPED | OUTPATIENT
Start: 2019-01-17 | End: 2019-04-30

## 2019-01-17 NOTE — PROGRESS NOTES
ANTICOAGULATION FOLLOW-UP CLINIC VISIT    Patient Name:  Donato De Leon  Date:  2019  Contact Type:  Face to Face    SUBJECTIVE:     Patient Findings     Comments:   Reports had ulcerative colitis flare again with bloody stools. When this happens he stops his warfarin. Getting Remicade infusions every 8 wks for colitis. States this helps but dose not seem to last. Therapeutic today. Continue same dose. Works with GI for ongoing symptoms. Call if changes or concerns.            OBJECTIVE    INR Protime   Date Value Ref Range Status   2019 2.5 (A) 0.86 - 1.14 Final       ASSESSMENT / PLAN  INR assessment THER    Recheck INR In: 4 WEEKS    INR Location Clinic      Anticoagulation Summary  As of 2019    INR goal:   2.0-3.0   TTR:   62.0 % (1.8 y)   INR used for dosin.5 (2019)   Warfarin maintenance plan:   10 mg (5 mg x 2) every day   Full warfarin instructions:   10 mg every day   Weekly warfarin total:   70 mg   No change documented:   Lyudmila Good RN   Plan last modified:   Lyudmila Good RN (10/26/2017)   Next INR check:   2/15/2019   Target end date:       Indications    Deep vein thrombosis (DVT) (H) [I82.409] [I82.409]  Long-term (current) use of anticoagulants [Z79.01] [Z79.01]             Anticoagulation Episode Summary     INR check location:       Preferred lab:       Send INR reminders to:   [facility]    Comments:         Anticoagulation Care Providers     Provider Role Specialty Phone number    Carter Skinner PA-C [facility] [phone]            See the Encounter Report to view Anticoagulation Flowsheet and Dosing Calendar (Go to Encounters tab in chart review, and find the Anticoagulation Therapy Visit)        Lyudmila Good RN

## 2019-02-12 ENCOUNTER — MEDICAL CORRESPONDENCE (OUTPATIENT)
Dept: HEALTH INFORMATION MANAGEMENT | Facility: CLINIC | Age: 49
End: 2019-02-12

## 2019-02-12 ENCOUNTER — TRANSFERRED RECORDS (OUTPATIENT)
Dept: HEALTH INFORMATION MANAGEMENT | Facility: CLINIC | Age: 49
End: 2019-02-12

## 2019-03-27 ENCOUNTER — MEDICAL CORRESPONDENCE (OUTPATIENT)
Dept: HEALTH INFORMATION MANAGEMENT | Facility: CLINIC | Age: 49
End: 2019-03-27

## 2019-03-27 ENCOUNTER — ANTICOAGULATION THERAPY VISIT (OUTPATIENT)
Dept: NURSING | Facility: CLINIC | Age: 49
End: 2019-03-27
Payer: COMMERCIAL

## 2019-03-27 ENCOUNTER — TRANSFERRED RECORDS (OUTPATIENT)
Dept: HEALTH INFORMATION MANAGEMENT | Facility: CLINIC | Age: 49
End: 2019-03-27

## 2019-03-27 DIAGNOSIS — I82.402 DEEP VEIN THROMBOSIS (DVT) OF LEFT LOWER EXTREMITY, UNSPECIFIED CHRONICITY, UNSPECIFIED VEIN (H): ICD-10-CM

## 2019-03-27 LAB — INR POINT OF CARE: 1 (ref 0.86–1.14)

## 2019-03-27 PROCEDURE — 36416 COLLJ CAPILLARY BLOOD SPEC: CPT

## 2019-03-27 PROCEDURE — 99207 ZZC NO CHARGE NURSE ONLY: CPT

## 2019-03-27 PROCEDURE — 85610 PROTHROMBIN TIME: CPT | Mod: QW

## 2019-03-27 NOTE — PROGRESS NOTES
ANTICOAGULATION FOLLOW-UP CLINIC VISIT    Patient Name:  Donato De Leon  Date:  3/27/2019  Contact Type:  Face to Face    SUBJECTIVE:     Patient Findings     Positives:   Signs/symptoms of bleeding, Missed doses    Comments:   Had recent flare of UC again and has been off warfarin for 3 wks due to blood in stools. See by GI provider today and told to resume warfarin and continue even if having flare with bleeding. He is waiting for insurance to ok new medication to help control symptoms between infusions. Has been therapeutic on previous dose. .            OBJECTIVE    INR Protime   Date Value Ref Range Status   2019 1.0 0.86 - 1.14 Final       ASSESSMENT / PLAN  INR assessment SUB    Recheck INR In: 3 WEEKS    INR Location Clinic      Anticoagulation Summary  As of 3/27/2019    INR goal:   2.0-3.0   TTR:   59.3 % (2 y)   INR used for dosin.0! (3/27/2019)   Warfarin maintenance plan:   10 mg (5 mg x 2) every day   Full warfarin instructions:   10 mg every day   Weekly warfarin total:   70 mg   Plan last modified:   Lyudmila Good RN (10/26/2017)   Next INR check:   2019   Target end date:       Indications    Deep vein thrombosis (DVT) (H) [I82.409] [I82.409]  Long-term (current) use of anticoagulants [Z79.01] [Z79.01]             Anticoagulation Episode Summary     INR check location:       Preferred lab:       Send INR reminders to:   [facility]    Comments:         Anticoagulation Care Providers     Provider Role Specialty Phone number    OppeCarter murdock PA-C Responsible Physician Assistant [phone]            See the Encounter Report to view Anticoagulation Flowsheet and Dosing Calendar (Go to Encounters tab in chart review, and find the Anticoagulation Therapy Visit)        Lyudmila Good RN

## 2019-04-22 ENCOUNTER — ANTICOAGULATION THERAPY VISIT (OUTPATIENT)
Dept: NURSING | Facility: CLINIC | Age: 49
End: 2019-04-22

## 2019-04-22 ENCOUNTER — TRANSFERRED RECORDS (OUTPATIENT)
Dept: HEALTH INFORMATION MANAGEMENT | Facility: CLINIC | Age: 49
End: 2019-04-22

## 2019-04-22 ENCOUNTER — TELEPHONE (OUTPATIENT)
Dept: FAMILY MEDICINE | Facility: CLINIC | Age: 49
End: 2019-04-22

## 2019-04-22 DIAGNOSIS — I82.402 DEEP VEIN THROMBOSIS (DVT) OF LEFT LOWER EXTREMITY, UNSPECIFIED CHRONICITY, UNSPECIFIED VEIN (H): ICD-10-CM

## 2019-04-22 DIAGNOSIS — I82.401 DEEP VEIN THROMBOSIS (DVT) OF RIGHT LOWER EXTREMITY, UNSPECIFIED CHRONICITY, UNSPECIFIED VEIN (H): ICD-10-CM

## 2019-04-22 DIAGNOSIS — K51.919 ULCERATIVE COLITIS WITH COMPLICATION, UNSPECIFIED LOCATION (H): Primary | ICD-10-CM

## 2019-04-22 LAB — INR POINT OF CARE: 2.1 (ref 0.86–1.14)

## 2019-04-22 PROCEDURE — 36416 COLLJ CAPILLARY BLOOD SPEC: CPT

## 2019-04-22 PROCEDURE — 99207 ZZC NO CHARGE NURSE ONLY: CPT

## 2019-04-22 PROCEDURE — 85610 PROTHROMBIN TIME: CPT | Mod: QW

## 2019-04-22 RX ORDER — FOLIC ACID 1 MG/1
TABLET ORAL
Refills: 3 | COMMUNITY
Start: 2019-03-29 | End: 2021-08-11

## 2019-04-22 RX ORDER — METHOTREXATE 25 MG/ML
INJECTION INTRA-ARTERIAL; INTRAMUSCULAR; INTRATHECAL; INTRAVENOUS
Refills: 2 | COMMUNITY
Start: 2019-03-27 | End: 2021-08-11

## 2019-04-22 NOTE — PROGRESS NOTES
ANTICOAGULATION FOLLOW-UP CLINIC VISIT    Patient Name:  Donato De Leon  Date:  2019  Contact Type:  Face to Face    SUBJECTIVE:     Patient Findings     Comments:   Doing well. On new infusion for colitis that is working well. No bleeding concerns . Resumed his weekly dose after last visit. Continue same.            OBJECTIVE    INR Protime   Date Value Ref Range Status   2019 2.1 (A) 0.86 - 1.14 Final       ASSESSMENT / PLAN  INR assessment THER    Recheck INR In: 4 WEEKS    INR Location Clinic      Anticoagulation Summary  As of 2019    INR goal:   2.0-3.0   TTR:   57.5 % (2 y)   INR used for dosin.1 (2019)   Warfarin maintenance plan:   10 mg (5 mg x 2) every day   Full warfarin instructions:   10 mg every day   Weekly warfarin total:   70 mg   No change documented:   Lyudmila Good RN   Plan last modified:   Lyudmila Good RN (10/26/2017)   Next INR check:   2019   Target end date:       Indications    Deep vein thrombosis (DVT) (H) [I82.409] [I82.409]  Long-term (current) use of anticoagulants [Z79.01] [Z79.01]             Anticoagulation Episode Summary     INR check location:       Preferred lab:       Send INR reminders to:   [facility]    Comments:         Anticoagulation Care Providers     Provider Role Specialty Phone number    Carter Skinner PA-C Responsible Physician Assistant [phone]            See the Encounter Report to view Anticoagulation Flowsheet and Dosing Calendar (Go to Encounters tab in chart review, and find the Anticoagulation Therapy Visit)        Lyudmila Good RN

## 2019-04-30 DIAGNOSIS — I82.402 DEEP VEIN THROMBOSIS (DVT) OF LEFT LOWER EXTREMITY, UNSPECIFIED CHRONICITY, UNSPECIFIED VEIN (H): ICD-10-CM

## 2019-04-30 RX ORDER — WARFARIN SODIUM 5 MG/1
TABLET ORAL
Qty: 180 TABLET | Refills: 0 | Status: SHIPPED | OUTPATIENT
Start: 2019-04-30 | End: 2019-07-10

## 2019-04-30 NOTE — TELEPHONE ENCOUNTER
Prescription approved per [facility] Refill Protocol.Lyudmila Good RN

## 2019-05-07 ENCOUNTER — MEDICAL CORRESPONDENCE (OUTPATIENT)
Dept: HEALTH INFORMATION MANAGEMENT | Facility: CLINIC | Age: 49
End: 2019-05-07

## 2019-05-21 ENCOUNTER — ANTICOAGULATION THERAPY VISIT (OUTPATIENT)
Dept: NURSING | Facility: CLINIC | Age: 49
End: 2019-05-21
Payer: COMMERCIAL

## 2019-05-21 DIAGNOSIS — I82.402 DEEP VEIN THROMBOSIS (DVT) OF LEFT LOWER EXTREMITY, UNSPECIFIED CHRONICITY, UNSPECIFIED VEIN (H): ICD-10-CM

## 2019-05-21 LAB — INR POINT OF CARE: 1.4 (ref 0.86–1.14)

## 2019-05-21 PROCEDURE — 36416 COLLJ CAPILLARY BLOOD SPEC: CPT

## 2019-05-21 PROCEDURE — 85610 PROTHROMBIN TIME: CPT | Mod: QW

## 2019-05-21 PROCEDURE — 99207 ZZC NO CHARGE NURSE ONLY: CPT

## 2019-05-21 NOTE — PROGRESS NOTES
ANTICOAGULATION FOLLOW-UP CLINIC VISIT    Patient Name:  Donato De Leon  Date:  2019  Contact Type:  Face to Face    SUBJECTIVE:  Patient Findings     Comments:   No missed dose. Colitis is under control now with IV treatment more frequently. No bleeding. No colitis flares. Diet unchanged. Unsure why sub unless due to less inflammation. Bump dose and recheck in 2 wks.         Clinical Outcomes     Comments:   No missed dose. Colitis is under control now with IV treatment more frequently. No bleeding. No colitis flares. Diet unchanged. Unsure why sub unless due to less inflammation. Bump dose and recheck in 2 wks.            OBJECTIVE    INR Protime   Date Value Ref Range Status   2019 1.6 (A) 0.86 - 1.14 Final       ASSESSMENT / PLAN  INR assessment SUB    Recheck INR In: 2 WEEKS    INR Location Clinic      Anticoagulation Summary  As of 2019    INR goal:   2.0-3.0   TTR:   56.1 % (2.1 y)   INR used for dosin.6! (2019)   Warfarin maintenance plan:   10 mg (5 mg x 2) every day   Full warfarin instructions:   : 15 mg; : 15 mg; Otherwise 10 mg every day   Weekly warfarin total:   70 mg   Plan last modified:   Lyudmila Good RN (10/26/2017)   Next INR check:   2019   Target end date:       Indications    Deep vein thrombosis (DVT) (H) [I82.409] [I82.409]  Long-term (current) use of anticoagulants [Z79.01] [Z79.01]             Anticoagulation Episode Summary     INR check location:       Preferred lab:       Send INR reminders to:   [facility]    Comments:         Anticoagulation Care Providers     Provider Role Specialty Phone number    Carter Skinner PA-C Responsible Physician Assistant [phone]            See the Encounter Report to view Anticoagulation Flowsheet and Dosing Calendar (Go to Encounters tab in chart review, and find the Anticoagulation Therapy Visit)        Lyudmila Good RN

## 2019-06-20 ENCOUNTER — TRANSFERRED RECORDS (OUTPATIENT)
Dept: HEALTH INFORMATION MANAGEMENT | Facility: CLINIC | Age: 49
End: 2019-06-20

## 2019-06-20 ENCOUNTER — MEDICAL CORRESPONDENCE (OUTPATIENT)
Dept: HEALTH INFORMATION MANAGEMENT | Facility: CLINIC | Age: 49
End: 2019-06-20

## 2019-07-02 ENCOUNTER — TELEPHONE (OUTPATIENT)
Dept: FAMILY MEDICINE | Facility: CLINIC | Age: 49
End: 2019-07-02

## 2019-07-02 NOTE — TELEPHONE ENCOUNTER
Overdue for INR. Has cancelled last scheduled apts. Left message on voicemail to call back and schedule INR visit. Lyudmila Good RN

## 2019-07-10 ENCOUNTER — ANTICOAGULATION THERAPY VISIT (OUTPATIENT)
Dept: NURSING | Facility: CLINIC | Age: 49
End: 2019-07-10
Payer: COMMERCIAL

## 2019-07-10 DIAGNOSIS — I82.402 DEEP VEIN THROMBOSIS (DVT) OF LEFT LOWER EXTREMITY, UNSPECIFIED CHRONICITY, UNSPECIFIED VEIN (H): ICD-10-CM

## 2019-07-10 DIAGNOSIS — Z79.01 LONG TERM CURRENT USE OF ANTICOAGULANT THERAPY: ICD-10-CM

## 2019-07-10 LAB — INR POINT OF CARE: 1.8 (ref 0.86–1.14)

## 2019-07-10 PROCEDURE — 36416 COLLJ CAPILLARY BLOOD SPEC: CPT

## 2019-07-10 PROCEDURE — 85610 PROTHROMBIN TIME: CPT | Mod: QW

## 2019-07-10 RX ORDER — WARFARIN SODIUM 5 MG/1
TABLET ORAL
Qty: 1 TABLET | Refills: 0 | COMMUNITY
Start: 2019-07-10 | End: 2020-09-21

## 2019-07-10 NOTE — PROGRESS NOTES
ANTICOAGULATION FOLLOW-UP CLINIC VISIT    Patient Name:  Donato De Leon  Date:  7/10/2019  Contact Type:  Face to Face    SUBJECTIVE:  Patient Findings     Comments:   Eating more salads and will probably continue. Reports colitis is under control so has less inflammation. No bleeding. No missed doses. Will adjust weekly dose. Discussed possibility of getting home monitor in future.         Clinical Outcomes     Comments:   Eating more salads and will probably continue. Reports colitis is under control so has less inflammation. No bleeding. No missed doses. Will adjust weekly dose. Discussed possibility of getting home monitor in future.            OBJECTIVE    INR Protime   Date Value Ref Range Status   07/10/2019 1.8 (A) 0.86 - 1.14 Final       ASSESSMENT / PLAN  INR assessment SUB    Recheck INR In: 3 WEEKS    INR Location Clinic      Anticoagulation Summary  As of 7/10/2019    INR goal:   2.0-3.0   TTR:   52.5 % (2.3 y)   INR used for dosin.8! (7/10/2019)   Warfarin maintenance plan:   15 mg (5 mg x 3) every Wed; 10 mg (5 mg x 2) all other days   Full warfarin instructions:   15 mg every Wed; 10 mg all other days   Weekly warfarin total:   75 mg   Plan last modified:   Lyudmila Good RN (7/10/2019)   Next INR check:   2019   Target end date:       Indications    Deep vein thrombosis (DVT) (H) [I82.409] [I82.409]  Long-term (current) use of anticoagulants [Z79.01] [Z79.01]             Anticoagulation Episode Summary     INR check location:       Preferred lab:       Send INR reminders to:   [facility]    Comments:         Anticoagulation Care Providers     Provider Role Specialty Phone number    Carter Skinner PA-C Responsible Physician Assistant [phone]            See the Encounter Report to view Anticoagulation Flowsheet and Dosing Calendar (Go to Encounters tab in chart review, and find the Anticoagulation Therapy Visit)        Lyudmila Good RN

## 2019-08-01 ENCOUNTER — MEDICAL CORRESPONDENCE (OUTPATIENT)
Dept: HEALTH INFORMATION MANAGEMENT | Facility: CLINIC | Age: 49
End: 2019-08-01

## 2019-08-01 ENCOUNTER — TRANSFERRED RECORDS (OUTPATIENT)
Dept: HEALTH INFORMATION MANAGEMENT | Facility: CLINIC | Age: 49
End: 2019-08-01

## 2019-08-01 ENCOUNTER — ANTICOAGULATION THERAPY VISIT (OUTPATIENT)
Dept: NURSING | Facility: CLINIC | Age: 49
End: 2019-08-01
Payer: COMMERCIAL

## 2019-08-01 DIAGNOSIS — I82.402 DEEP VEIN THROMBOSIS (DVT) OF LEFT LOWER EXTREMITY, UNSPECIFIED CHRONICITY, UNSPECIFIED VEIN (H): ICD-10-CM

## 2019-08-01 DIAGNOSIS — Z79.01 LONG TERM CURRENT USE OF ANTICOAGULANT THERAPY: ICD-10-CM

## 2019-08-01 LAB — INR POINT OF CARE: 1.9 (ref 0.86–1.14)

## 2019-08-01 PROCEDURE — 85610 PROTHROMBIN TIME: CPT | Mod: QW

## 2019-08-01 PROCEDURE — 36416 COLLJ CAPILLARY BLOOD SPEC: CPT

## 2019-08-01 NOTE — PROGRESS NOTES
ANTICOAGULATION FOLLOW-UP CLINIC VISIT    Patient Name:  Donato De Leon  Date:  2019  Contact Type:  Face to Face    SUBJECTIVE:  Patient Findings     Comments:   The patient was assessed for diet, medication, and activity level changes, missed or extra doses, bruising or bleeding, with no problem findings.  Chanelle LYNNE, RN, CPN          Clinical Outcomes     Negatives:   Major bleeding event, Thromboembolic event, Anticoagulation-related hospital admission, Anticoagulation-related ED visit, Anticoagulation-related fatality    Comments:   The patient was assessed for diet, medication, and activity level changes, missed or extra doses, bruising or bleeding, with no problem findings.  Chanelle LYNNE, RN, CPN             OBJECTIVE    INR Protime   Date Value Ref Range Status   2019 1.9 (A) 0.86 - 1.14 Final       ASSESSMENT / PLAN  INR assessment SUB    Recheck INR In: 3 WEEKS    INR Location Clinic      Anticoagulation Summary  As of 2019    INR goal:   2.0-3.0   TTR:   51.1 % (2.3 y)   INR used for dosin.9! (2019)   Warfarin maintenance plan:   15 mg (5 mg x 3) every Wed, Sat; 10 mg (5 mg x 2) all other days   Full warfarin instructions:   15 mg every Wed, Sat; 10 mg all other days   Weekly warfarin total:   80 mg   Plan last modified:   Chanelle Fu RN (2019)   Next INR check:   2019   Target end date:       Indications    Deep vein thrombosis (DVT) (H) [I82.409] [I82.409]  Long-term (current) use of anticoagulants [Z79.01] [Z79.01]             Anticoagulation Episode Summary     INR check location:       Preferred lab:       Send INR reminders to:   [facility]    Comments:         Anticoagulation Care Providers     Provider Role Specialty Phone number    Carter Skinner PA-C Responsible Physician Assistant [phone]            See the Encounter Report to view Anticoagulation Flowsheet and Dosing Calendar (Go to Encounters tab in chart review, and  find the Anticoagulation Therapy Visit)        Chanelle Fu RN

## 2019-08-16 DIAGNOSIS — I82.402 DEEP VEIN THROMBOSIS (DVT) OF LEFT LOWER EXTREMITY, UNSPECIFIED CHRONICITY, UNSPECIFIED VEIN (H): ICD-10-CM

## 2019-08-16 RX ORDER — WARFARIN SODIUM 5 MG/1
TABLET ORAL
Qty: 204 TABLET | Refills: 0 | Status: SHIPPED | OUTPATIENT
Start: 2019-08-16 | End: 2019-11-11

## 2019-08-22 ENCOUNTER — MEDICAL CORRESPONDENCE (OUTPATIENT)
Dept: HEALTH INFORMATION MANAGEMENT | Facility: CLINIC | Age: 49
End: 2019-08-22

## 2019-08-22 ENCOUNTER — ANTICOAGULATION THERAPY VISIT (OUTPATIENT)
Dept: NURSING | Facility: CLINIC | Age: 49
End: 2019-08-22
Payer: COMMERCIAL

## 2019-08-22 DIAGNOSIS — I82.402 DEEP VEIN THROMBOSIS (DVT) OF LEFT LOWER EXTREMITY, UNSPECIFIED CHRONICITY, UNSPECIFIED VEIN (H): ICD-10-CM

## 2019-08-22 DIAGNOSIS — Z79.01 LONG TERM CURRENT USE OF ANTICOAGULANT THERAPY: ICD-10-CM

## 2019-08-22 LAB — INR POINT OF CARE: 3.8 (ref 0.86–1.14)

## 2019-08-22 PROCEDURE — 85610 PROTHROMBIN TIME: CPT | Mod: QW

## 2019-08-22 PROCEDURE — 36416 COLLJ CAPILLARY BLOOD SPEC: CPT

## 2019-08-22 NOTE — PROGRESS NOTES
ANTICOAGULATION FOLLOW-UP CLINIC VISIT    Patient Name:  Donato De Leon  Date:  8/22/2019  Contact Type:  Face to Face    SUBJECTIVE:  Patient Findings     Comments:   Supra with dose increase. Reports not eating as much salad in past weeks as before but no other changes. No bleeding or bruising. Colitis currently under control and continues on infusion tx for this every 6 wks. Travels for work. Discussed getting home monitor as it would benefit him with his work schedule and being able to test more consistently. I will submit order for home monitor.         Clinical Outcomes     Comments:   Supra with dose increase. Reports not eating as much salad in past weeks as before but no other changes. No bleeding or bruising. Colitis currently under control and continues on infusion tx for this every 6 wks. Travels for work. Discussed getting home monitor as it would benefit him with his work schedule and being able to test more consistently. I will submit order for home monitor.            OBJECTIVE    INR Protime   Date Value Ref Range Status   08/22/2019 3.8 (A) 0.86 - 1.14 Final       ASSESSMENT / PLAN  INR assessment SUPRA    Recheck INR In: 3 WEEKS    INR Location Clinic      Anticoagulation Summary  As of 8/22/2019    INR goal:   2.0-3.0   TTR:   51.2 % (2.4 y)   INR used for dosing:   3.8! (8/22/2019)   Warfarin maintenance plan:   15 mg (5 mg x 3) every Wed; 10 mg (5 mg x 2) all other days   Full warfarin instructions:   15 mg every Wed; 10 mg all other days   Weekly warfarin total:   75 mg   Plan last modified:   Lyudmila Good RN (8/22/2019)   Next INR check:   9/12/2019   Target end date:       Indications    Deep vein thrombosis (DVT) (H) [I82.409] [I82.409]  Long-term (current) use of anticoagulants [Z79.01] [Z79.01]             Anticoagulation Episode Summary     INR check location:       Preferred lab:       Send INR reminders to:   [facility]    Comments:         Anticoagulation Care Providers      Provider Role Specialty Phone number    Brody Carter Romo PA-C Responsible Physician Assistant [phone]            See the Encounter Report to view Anticoagulation Flowsheet and Dosing Calendar (Go to Encounters tab in chart review, and find the Anticoagulation Therapy Visit)        Lyudmila Good RN

## 2019-09-09 LAB — INR PPP: 3 (ref 0.9–1.1)

## 2019-09-19 ENCOUNTER — MEDICAL CORRESPONDENCE (OUTPATIENT)
Dept: HEALTH INFORMATION MANAGEMENT | Facility: CLINIC | Age: 49
End: 2019-09-19

## 2019-09-19 ENCOUNTER — TRANSFERRED RECORDS (OUTPATIENT)
Dept: HEALTH INFORMATION MANAGEMENT | Facility: CLINIC | Age: 49
End: 2019-09-19

## 2019-09-26 ENCOUNTER — TELEPHONE (OUTPATIENT)
Dept: FAMILY MEDICINE | Facility: CLINIC | Age: 49
End: 2019-09-26

## 2019-09-26 NOTE — TELEPHONE ENCOUNTER
Overdue for INR.  Was to  Have home monitor set up. Call attempt and left message for patient to call me back. Need to know if he got his monitor or if needs to come in to clinic for INR. Lyudmila Good RN

## 2019-09-27 NOTE — TELEPHONE ENCOUNTER
Left message on voicemail for patient to call me back. Fax found for INR results on 9/9/19. INR =3.0.  Lyudmila Good RN

## 2019-09-28 ENCOUNTER — TRANSFERRED RECORDS (OUTPATIENT)
Dept: HEALTH INFORMATION MANAGEMENT | Facility: CLINIC | Age: 49
End: 2019-09-28

## 2019-09-28 LAB — INR PPP: 3.2 (ref 0.9–1.1)

## 2019-09-30 NOTE — TELEPHONE ENCOUNTER
Left message for patient to call me back to discuss INR results. Lyudmila Good RN  INR 3.2 on 9/28/19 per home monitor.

## 2019-10-01 ENCOUNTER — ANTICOAGULATION THERAPY VISIT (OUTPATIENT)
Dept: FAMILY MEDICINE | Facility: CLINIC | Age: 49
End: 2019-10-01
Payer: COMMERCIAL

## 2019-10-01 DIAGNOSIS — Z79.01 LONG TERM CURRENT USE OF ANTICOAGULANT THERAPY: ICD-10-CM

## 2019-10-01 DIAGNOSIS — I82.402 DEEP VEIN THROMBOSIS (DVT) OF LEFT LOWER EXTREMITY, UNSPECIFIED CHRONICITY, UNSPECIFIED VEIN (H): ICD-10-CM

## 2019-10-01 PROCEDURE — 99207 ZZC NO CHARGE NURSE ONLY: CPT | Performed by: PHYSICIAN ASSISTANT

## 2019-10-01 NOTE — PROGRESS NOTES
ANTICOAGULATION FOLLOW-UP CLINIC VISIT    Patient Name:  Donato De Leon  Date:  10/1/2019  Contact Type:  Telephone    SUBJECTIVE:  Patient Findings     Comments:   Per phone call ptaient reports no change in diet, meds and no concerns. No bleeding or bruising.         Clinical Outcomes     Comments:   Per phone call ptaient reports no change in diet, meds and no concerns. No bleeding or bruising.            OBJECTIVE    INR   Date Value Ref Range Status   09/28/2019 3.2 (A) 0.9 - 1.1 Final       ASSESSMENT / PLAN  INR assessment SUPRA    Recheck INR In: 10 DAYS    INR Location Home INR    Billed home INR No      Anticoagulation Summary  As of 10/1/2019    INR goal:   2.0-3.0   TTR:   49.1 % (2.5 y)   INR used for dosing:   3.2! (9/28/2019)   Warfarin maintenance plan:   15 mg (5 mg x 3) every Wed; 10 mg (5 mg x 2) all other days   Full warfarin instructions:   15 mg every Wed; 10 mg all other days   Weekly warfarin total:   75 mg   No change documented:   Lyudmila Good RN   Plan last modified:   Lyudmila Good RN (8/22/2019)   Next INR check:   10/10/2019   Target end date:       Indications    Deep vein thrombosis (DVT) (H) [I82.409] [I82.409]  Long-term (current) use of anticoagulants [Z79.01] [Z79.01]             Anticoagulation Episode Summary     INR check location:       Preferred lab:       Send INR reminders to:   [facility]    Comments:         Anticoagulation Care Providers     Provider Role Specialty Phone number    Carter Skinner PA-C Responsible Physician Assistant [phone]            See the Encounter Report to view Anticoagulation Flowsheet and Dosing Calendar (Go to Encounters tab in chart review, and find the Anticoagulation Therapy Visit)        Lyudmila Good RN

## 2019-10-01 NOTE — TELEPHONE ENCOUNTER
Patient returned call and left message on my voicemail.See anticoagulation encounter. Lyudmila Good RN

## 2019-10-18 ENCOUNTER — TELEPHONE (OUTPATIENT)
Dept: FAMILY MEDICINE | Facility: CLINIC | Age: 49
End: 2019-10-18

## 2019-10-18 NOTE — TELEPHONE ENCOUNTER
Was due for INR to be done on home monitor in past week. No results received. Called and left message on cell # for patient to call me back. Lyudmila Good RN

## 2019-10-19 ENCOUNTER — TRANSFERRED RECORDS (OUTPATIENT)
Dept: HEALTH INFORMATION MANAGEMENT | Facility: CLINIC | Age: 49
End: 2019-10-19

## 2019-10-19 LAB — INR PPP: 2.3 (ref 0.9–1.1)

## 2019-10-21 NOTE — TELEPHONE ENCOUNTER
Left message for patient to call me back. INR results 2.3 from home monitor on 10/19/19. Lyudmila Good RN

## 2019-10-22 ENCOUNTER — ANTICOAGULATION THERAPY VISIT (OUTPATIENT)
Dept: FAMILY MEDICINE | Facility: CLINIC | Age: 49
End: 2019-10-22
Payer: COMMERCIAL

## 2019-10-22 DIAGNOSIS — I82.402 DEEP VEIN THROMBOSIS (DVT) OF LEFT LOWER EXTREMITY, UNSPECIFIED CHRONICITY, UNSPECIFIED VEIN (H): ICD-10-CM

## 2019-10-22 DIAGNOSIS — Z79.01 LONG TERM CURRENT USE OF ANTICOAGULANT THERAPY: ICD-10-CM

## 2019-10-22 PROCEDURE — 99207 ZZC NO CHARGE NURSE ONLY: CPT | Performed by: PHYSICIAN ASSISTANT

## 2019-10-22 NOTE — PROGRESS NOTES
ANTICOAGULATION FOLLOW-UP CLINIC VISIT    Patient Name:  Donato De Leon  Date:  10/22/2019  Contact Type:  Telephone    SUBJECTIVE:  Patient Findings     Comments:   INR therapeutic . Discussed results with patient on phone. He reports no changes in meds. No concerns. No bleeding. Reminded that when he does INR at home to follow up with me. Continue same dose and recheck in 2 wks.         Clinical Outcomes     Negatives:   Major bleeding event, Thromboembolic event, Anticoagulation-related hospital admission, Anticoagulation-related ED visit, Anticoagulation-related fatality    Comments:   INR therapeutic . Discussed results with patient on phone. He reports no changes in meds. No concerns. No bleeding. Reminded that when he does INR at home to follow up with me. Continue same dose and recheck in 2 wks.            OBJECTIVE    INR   Date Value Ref Range Status   10/19/2019 2.3 (A) 0.9 - 1.1 Final       ASSESSMENT / PLAN  INR assessment THER    Recheck INR In: 2 WEEKS    INR Location Home INR    Billed home INR No      Anticoagulation Summary  As of 10/22/2019    INR goal:   2.0-3.0   TTR:   49.7 % (2.5 y)   INR used for dosin.3 (10/19/2019)   Warfarin maintenance plan:   15 mg (5 mg x 3) every Wed; 10 mg (5 mg x 2) all other days   Full warfarin instructions:   15 mg every Wed; 10 mg all other days   Weekly warfarin total:   75 mg   No change documented:   Lyudmila Good RN   Plan last modified:   Lyudmila Good RN (2019)   Next INR check:   2019   Target end date:       Indications    Deep vein thrombosis (DVT) (H) [I82.409] [I82.409]  Long-term (current) use of anticoagulants [Z79.01] [Z79.01]             Anticoagulation Episode Summary     INR check location:       Preferred lab:       Send INR reminders to:   [facility]    Comments:         Anticoagulation Care Providers     Provider Role Specialty Phone number    Carter Skinner PA-C Responsible Physician Assistant  [phone]            See the Encounter Report to view Anticoagulation Flowsheet and Dosing Calendar (Go to Encounters tab in chart review, and find the Anticoagulation Therapy Visit)        Lyudmila Good RN

## 2019-10-22 NOTE — TELEPHONE ENCOUNTER
Spoke with patient on phone and discussed INR results. See anticoagulation encounter. Lyudmila Good RN

## 2019-10-31 ENCOUNTER — MEDICAL CORRESPONDENCE (OUTPATIENT)
Dept: HEALTH INFORMATION MANAGEMENT | Facility: CLINIC | Age: 49
End: 2019-10-31

## 2019-11-09 ENCOUNTER — TRANSFERRED RECORDS (OUTPATIENT)
Dept: HEALTH INFORMATION MANAGEMENT | Facility: CLINIC | Age: 49
End: 2019-11-09

## 2019-11-09 LAB — INR PPP: 2.6 (ref 0.9–1.1)

## 2019-11-11 DIAGNOSIS — I82.402 DEEP VEIN THROMBOSIS (DVT) OF LEFT LOWER EXTREMITY, UNSPECIFIED CHRONICITY, UNSPECIFIED VEIN (H): ICD-10-CM

## 2019-11-12 RX ORDER — WARFARIN SODIUM 5 MG/1
TABLET ORAL
Qty: 195 TABLET | Refills: 0 | Status: SHIPPED | OUTPATIENT
Start: 2019-11-12 | End: 2020-02-12

## 2019-11-14 ENCOUNTER — ANTICOAGULATION THERAPY VISIT (OUTPATIENT)
Dept: FAMILY MEDICINE | Facility: CLINIC | Age: 49
End: 2019-11-14
Payer: COMMERCIAL

## 2019-11-14 ENCOUNTER — TELEPHONE (OUTPATIENT)
Dept: FAMILY MEDICINE | Facility: CLINIC | Age: 49
End: 2019-11-14

## 2019-11-14 DIAGNOSIS — Z79.01 LONG TERM CURRENT USE OF ANTICOAGULANT THERAPY: ICD-10-CM

## 2019-11-14 DIAGNOSIS — I82.402 DEEP VEIN THROMBOSIS (DVT) OF LEFT LOWER EXTREMITY, UNSPECIFIED CHRONICITY, UNSPECIFIED VEIN (H): ICD-10-CM

## 2019-11-14 PROCEDURE — 99207 ZZC NO CHARGE NURSE ONLY: CPT | Performed by: PHYSICIAN ASSISTANT

## 2019-11-14 NOTE — TELEPHONE ENCOUNTER
Call back and message received on my voicemail from patient. He reports no concerns with bleeding and no changes in his medication. Reports feeling good and taking same dose of warfarin. Reports will continue same warfarin dose. See anticoagulation encounter. Lyudmila Good RN

## 2019-11-14 NOTE — PROGRESS NOTES
ANTICOAGULATION FOLLOW-UP CLINIC VISIT    Patient Name:  Donato De Leon  Date:  2019  Contact Type:  Telephone    SUBJECTIVE:  Patient Findings     Comments:   INR done on home monitor and therapeutic with no change or concerns. Continue same dose.          Clinical Outcomes     Negatives:   Major bleeding event, Thromboembolic event, Anticoagulation-related hospital admission, Anticoagulation-related ED visit, Anticoagulation-related fatality    Comments:   INR done on home monitor and therapeutic with no change or concerns. Continue same dose.             OBJECTIVE    INR   Date Value Ref Range Status   2019 2.6 (A) 0.9 - 1.1 Final       ASSESSMENT / PLAN  INR assessment THER    Recheck INR In: 2 WEEKS    INR Location Home INR    Billed home INR No      Anticoagulation Summary  As of 2019    INR goal:   2.0-3.0   TTR:   50.8 % (2.6 y)   INR used for dosin.6 (2019)   Warfarin maintenance plan:   15 mg (5 mg x 3) every Wed; 10 mg (5 mg x 2) all other days   Full warfarin instructions:   15 mg every Wed; 10 mg all other days   Weekly warfarin total:   75 mg   No change documented:   Lyudmila Good RN   Plan last modified:   Lyudmila Good RN (2019)   Next INR check:   2019   Priority:   Maintenance   Target end date:       Indications    Deep vein thrombosis (DVT) (H) [I82.409] [I82.409]  Long-term (current) use of anticoagulants [Z79.01] [Z79.01]             Anticoagulation Episode Summary     INR check location:       Preferred lab:       Send INR reminders to:   [facility]    Comments:         Anticoagulation Care Providers     Provider Role Specialty Phone number    Opcammy, Carter Romo PA-C Responsible Physician Assistant [phone]            See the Encounter Report to view Anticoagulation Flowsheet and Dosing Calendar (Go to Encounters tab in chart review, and find the Anticoagulation Therapy Visit)        Lyudmila Good RN

## 2019-11-14 NOTE — TELEPHONE ENCOUNTER
Call attempt to patient to discuss INR results from home monitor. INR was 2.6 on 11/9/19. Left message on voicemail for patient to call me back to discuss results.  Lyudmila Good RN

## 2019-12-05 ENCOUNTER — TRANSFERRED RECORDS (OUTPATIENT)
Dept: HEALTH INFORMATION MANAGEMENT | Facility: CLINIC | Age: 49
End: 2019-12-05

## 2019-12-05 ENCOUNTER — TELEPHONE (OUTPATIENT)
Dept: FAMILY MEDICINE | Facility: CLINIC | Age: 49
End: 2019-12-05

## 2019-12-05 LAB — INR PPP: 3.8 (ref 0.9–1.1)

## 2019-12-05 NOTE — TELEPHONE ENCOUNTER
Received results from home monitor and INR 3.8 today. Call attempt and left message on patient  Voicemail to call me back. Need to know if any concerns or changes in diet or medications. Lyudmila Good RN

## 2019-12-09 ENCOUNTER — ANTICOAGULATION THERAPY VISIT (OUTPATIENT)
Dept: FAMILY MEDICINE | Facility: CLINIC | Age: 49
End: 2019-12-09
Payer: COMMERCIAL

## 2019-12-09 DIAGNOSIS — Z79.01 LONG TERM CURRENT USE OF ANTICOAGULANT THERAPY: ICD-10-CM

## 2019-12-09 DIAGNOSIS — I82.402 DEEP VEIN THROMBOSIS (DVT) OF LEFT LOWER EXTREMITY, UNSPECIFIED CHRONICITY, UNSPECIFIED VEIN (H): ICD-10-CM

## 2019-12-09 PROCEDURE — 99207 ZZC NO CHARGE NURSE ONLY: CPT | Performed by: PHYSICIAN ASSISTANT

## 2019-12-09 NOTE — TELEPHONE ENCOUNTER
Patient returned call. Reports has been ill with pneumonia and lost voice. Currently on antibiotic but doesn't know name of it. Says has about week left of antibiotic. Advised he recheck INR at home today or tomorrow as last check on 12/5/19 was supra. Antibiotic and illness may increase INR. No bleeding concerns. See anticoagulation encounter. Lyudmila Good RN

## 2019-12-09 NOTE — PROGRESS NOTES
ANTICOAGULATION FOLLOW-UP CLINIC VISIT    Patient Name:  Donato De Leon  Date:  12/9/2019  Contact Type:  Telephone    SUBJECTIVE:  Patient Findings     Positives:   Change in health, Change in medications    Comments:   INR 3.8 per home monitor. Patient returned call today from message left for him on 12/5/19. Reports has been ill with pneumonia and lost voice so that is why he did not return call earlier. Has not adjusted warfarin dose. No bleeding. On antibiotic but can't tell me name of it and not in chart. [facility] and has about a wk left of doses. Advised he check INR today or tomorrow to see affect on INR. Call if INR out of range. He agrees.         Clinical Outcomes     Negatives:   Major bleeding event, Thromboembolic event, Anticoagulation-related hospital admission, Anticoagulation-related ED visit, Anticoagulation-related fatality    Comments:   INR 3.8 per home monitor. Patient returned call today from message left for him on 12/5/19. Reports has been ill with pneumonia and lost voice so that is why he did not return call earlier. Has not adjusted warfarin dose. No bleeding. On antibiotic but can't tell me name of it and not in chart. [facility] and has about a wk left of doses. Advised he check INR today or tomorrow to see affect on INR. Call if INR out of range. He agrees.            OBJECTIVE    INR   Date Value Ref Range Status   12/05/2019 3.8 (A) 0.9 - 1.1 Final       ASSESSMENT / PLAN  INR assessment SUPRA    Recheck INR In: 2 DAYS    INR Location Home INR      Anticoagulation Summary  As of 12/9/2019    INR goal:   2.0-3.0   TTR:   34.8 % (1 y)   INR used for dosing:   3.8! (12/5/2019)   Warfarin maintenance plan:   15 mg (5 mg x 3) every Wed; 10 mg (5 mg x 2) all other days   Full warfarin instructions:   15 mg every Wed; 10 mg all other days   Weekly warfarin total:   75 mg   No change documented:   Lyudmila Good RN   Plan last modified:   Florentino  Lyudmila NOONAN RN (8/22/2019)   Next INR check:   12/11/2019   Priority:   High   Target end date:       Indications    Deep vein thrombosis (DVT) (H) [I82.409] [I82.409]  Long-term (current) use of anticoagulants [Z79.01] [Z79.01]             Anticoagulation Episode Summary     INR check location:       Preferred lab:       Send INR reminders to:   GISEL PERSAUD    Comments:         Anticoagulation Care Providers     Provider Role Specialty Phone number    Oppel, Carter Romo PA-C Responsible Physician Assistant [phone]            See the Encounter Report to view Anticoagulation Flowsheet and Dosing Calendar (Go to Encounters tab in chart review, and find the Anticoagulation Therapy Visit)        Lyudmila Good RN

## 2019-12-11 ENCOUNTER — ANTICOAGULATION THERAPY VISIT (OUTPATIENT)
Dept: FAMILY MEDICINE | Facility: CLINIC | Age: 49
End: 2019-12-11
Payer: COMMERCIAL

## 2019-12-11 DIAGNOSIS — Z79.01 LONG TERM CURRENT USE OF ANTICOAGULANT THERAPY: ICD-10-CM

## 2019-12-11 DIAGNOSIS — I82.402 DEEP VEIN THROMBOSIS (DVT) OF LEFT LOWER EXTREMITY, UNSPECIFIED CHRONICITY, UNSPECIFIED VEIN (H): ICD-10-CM

## 2019-12-11 LAB — INR PPP: 3.7 (ref 0.9–1.1)

## 2019-12-11 PROCEDURE — G0250 MD INR TEST REVIE INTER MGMT: HCPCS | Performed by: PHYSICIAN ASSISTANT

## 2019-12-16 ENCOUNTER — TRANSFERRED RECORDS (OUTPATIENT)
Dept: HEALTH INFORMATION MANAGEMENT | Facility: CLINIC | Age: 49
End: 2019-12-16

## 2019-12-16 LAB
ALT SERPL-CCNC: 42 U/L (ref 0–78)
AST SERPL-CCNC: 21 U/L (ref 0–37)
CREAT SERPL-MCNC: 1.13 MG/DL (ref 0.7–1.3)

## 2020-01-03 ENCOUNTER — TELEPHONE (OUTPATIENT)
Dept: FAMILY MEDICINE | Facility: CLINIC | Age: 50
End: 2020-01-03

## 2020-01-03 NOTE — TELEPHONE ENCOUNTER
Overdue for INR check and has home monitor. No results have been received. .Left message for patient to call me back. Lyudmila Good RN

## 2020-01-05 ENCOUNTER — TRANSFERRED RECORDS (OUTPATIENT)
Dept: HEALTH INFORMATION MANAGEMENT | Facility: CLINIC | Age: 50
End: 2020-01-05

## 2020-01-05 LAB — INR PPP: 2.8 (ref 0.9–1.1)

## 2020-01-06 NOTE — TELEPHONE ENCOUNTER
INR results from home monitor received with results on 1/5/20 . INR therapeutic at 2.8. left message for patient to call me at [phone] to discuss results.   Lyudmila Good RN

## 2020-01-07 ENCOUNTER — ANTICOAGULATION THERAPY VISIT (OUTPATIENT)
Dept: FAMILY MEDICINE | Facility: CLINIC | Age: 50
End: 2020-01-07
Payer: COMMERCIAL

## 2020-01-07 DIAGNOSIS — I82.402 DEEP VEIN THROMBOSIS (DVT) OF LEFT LOWER EXTREMITY, UNSPECIFIED CHRONICITY, UNSPECIFIED VEIN (H): ICD-10-CM

## 2020-01-07 DIAGNOSIS — Z79.01 LONG TERM CURRENT USE OF ANTICOAGULANT THERAPY: ICD-10-CM

## 2020-01-07 PROCEDURE — 99207 ZZC NO CHARGE NURSE ONLY: CPT | Performed by: PHYSICIAN ASSISTANT

## 2020-01-07 NOTE — TELEPHONE ENCOUNTER
Patient called back leaving message on my voicemail. He reports no change in meds or diet and no bleeding or bruising. Has continued on same warfarin dose and will continue this and recheck INR in 2 wks. See anticoagulation encounter. Lyudmila Good RN

## 2020-01-07 NOTE — PROGRESS NOTES
ANTICOAGULATION FOLLOW-UP CLINIC VISIT    Patient Name:  Donato De Leon  Date:  2020  Contact Type:  Telephone    SUBJECTIVE:  Patient Findings     Comments:   Patient called back leaving message on my voicemail. He reports no change in meds or diet and no bleeding or bruising. Has continued on same warfarin dose and will continue this and recheck INR in 2 wks.        Clinical Outcomes     Negatives:   Major bleeding event, Thromboembolic event, Anticoagulation-related hospital admission, Anticoagulation-related ED visit, Anticoagulation-related fatality    Comments:   Patient called back leaving message on my voicemail. He reports no change in meds or diet and no bleeding or bruising. Has continued on same warfarin dose and will continue this and recheck INR in 2 wks.           OBJECTIVE    INR   Date Value Ref Range Status   2020 2.8 (A) 0.90 - 1.10 Final       ASSESSMENT / PLAN  INR assessment THER    Recheck INR In: 2 WEEKS    INR Location Home INR    Billed home INR No      Anticoagulation Summary  As of 2020    INR goal:   2.0-3.0   TTR:   28.1 % (1 y)   INR used for dosin.8 (2020)   Warfarin maintenance plan:   15 mg (5 mg x 3) every Wed; 10 mg (5 mg x 2) all other days   Full warfarin instructions:   15 mg every Wed; 10 mg all other days   Weekly warfarin total:   75 mg   No change documented:   Lyudmila Good RN   Plan last modified:   Lyudmila Good RN (2019)   Next INR check:   2020   Priority:   Maintenance   Target end date:       Indications    Deep vein thrombosis (DVT) (H) [I82.409] [I82.409]  Long-term (current) use of anticoagulants [Z79.01] [Z79.01]             Anticoagulation Episode Summary     INR check location:       Preferred lab:       Send INR reminders to:   GISEL PERSAUD    Comments:         Anticoagulation Care Providers     Provider Role Specialty Phone number    Carter Skinner PA-C Responsible Physician Assistant [phone]             See the Encounter Report to view Anticoagulation Flowsheet and Dosing Calendar (Go to Encounters tab in chart review, and find the Anticoagulation Therapy Visit)        Lyudmila Good RN

## 2020-01-24 ENCOUNTER — TRANSFERRED RECORDS (OUTPATIENT)
Dept: HEALTH INFORMATION MANAGEMENT | Facility: CLINIC | Age: 50
End: 2020-01-24

## 2020-01-24 LAB
ALT SERPL-CCNC: 28 U/L (ref 0–78)
AST SERPL-CCNC: 18 U/L (ref 0–37)
CREAT SERPL-MCNC: 1.06 MG/DL (ref 0.7–1.3)

## 2020-01-28 ENCOUNTER — TELEPHONE (OUTPATIENT)
Dept: FAMILY MEDICINE | Facility: CLINIC | Age: 50
End: 2020-01-28

## 2020-01-28 ENCOUNTER — TRANSFERRED RECORDS (OUTPATIENT)
Dept: HEALTH INFORMATION MANAGEMENT | Facility: CLINIC | Age: 50
End: 2020-01-28

## 2020-01-28 NOTE — TELEPHONE ENCOUNTER
Overdue for INR . Uses home monitor. Message left on voicemail for patient to call me  Back. Lyudmila Good RN

## 2020-01-29 ENCOUNTER — ANTICOAGULATION THERAPY VISIT (OUTPATIENT)
Dept: FAMILY MEDICINE | Facility: CLINIC | Age: 50
End: 2020-01-29
Payer: COMMERCIAL

## 2020-01-29 DIAGNOSIS — I82.402 DEEP VEIN THROMBOSIS (DVT) OF LEFT LOWER EXTREMITY, UNSPECIFIED CHRONICITY, UNSPECIFIED VEIN (H): ICD-10-CM

## 2020-01-29 DIAGNOSIS — Z79.01 LONG TERM CURRENT USE OF ANTICOAGULANT THERAPY: ICD-10-CM

## 2020-01-29 LAB — INR PPP: 1.5 (ref 0.9–1.1)

## 2020-01-29 PROCEDURE — 99207 ZZC NO CHARGE NURSE ONLY: CPT | Performed by: PHYSICIAN ASSISTANT

## 2020-01-29 NOTE — TELEPHONE ENCOUNTER
Faxed results received. INR 1.5 today. Call attempt and left message for patient to call me back to discuss results . Lyudmila Good RN

## 2020-01-29 NOTE — PROGRESS NOTES
ANTICOAGULATION FOLLOW-UP CLINIC VISIT    Patient Name:  Donato De Leon  Date:  2020  Contact Type:  Telephone    SUBJECTIVE:  Patient Findings     Comments:   Patient denies any identifiable changes that caused the subtherapeutic INR.  Reports colitis has been well controlled.           Clinical Outcomes     Comments:   Patient denies any identifiable changes that caused the subtherapeutic INR.  Reports colitis has been well controlled.              OBJECTIVE    INR   Date Value Ref Range Status   2020 1.5 (A) 0.90 - 1.10 Final       ASSESSMENT / PLAN  INR assessment SUB    Recheck INR In: 2 WEEKS    INR Location Home INR      Anticoagulation Summary  As of 2020    INR goal:   2.0-3.0   TTR:   26.0 % (1 y)   INR used for dosin.5! (2020)   Warfarin maintenance plan:   15 mg (5 mg x 3) every Wed; 10 mg (5 mg x 2) all other days   Full warfarin instructions:   : 15 mg; Otherwise 15 mg every Wed; 10 mg all other days   Weekly warfarin total:   75 mg   Plan last modified:   Lyudmila Good RN (2019)   Next INR check:   2020   Priority:   High   Target end date:       Indications    Deep vein thrombosis (DVT) (H) [I82.409] [I82.409]  Long-term (current) use of anticoagulants [Z79.01] [Z79.01]             Anticoagulation Episode Summary     INR check location:       Preferred lab:       Send INR reminders to:   GISEL PERSAUD    Comments:         Anticoagulation Care Providers     Provider Role Specialty Phone number    KeelypeCarter murdock PA-C Responsible Physician Assistant [phone]            See the Encounter Report to view Anticoagulation Flowsheet and Dosing Calendar (Go to Encounters tab in chart review, and find the Anticoagulation Therapy Visit)        Lyudmila Good RN

## 2020-02-12 DIAGNOSIS — I82.402 DEEP VEIN THROMBOSIS (DVT) OF LEFT LOWER EXTREMITY, UNSPECIFIED CHRONICITY, UNSPECIFIED VEIN (H): ICD-10-CM

## 2020-02-12 RX ORDER — WARFARIN SODIUM 5 MG/1
TABLET ORAL
Qty: 195 TABLET | Refills: 0 | Status: SHIPPED | OUTPATIENT
Start: 2020-02-12 | End: 2020-05-12

## 2020-02-14 ENCOUNTER — TELEPHONE (OUTPATIENT)
Dept: FAMILY MEDICINE | Facility: CLINIC | Age: 50
End: 2020-02-14

## 2020-02-14 NOTE — TELEPHONE ENCOUNTER
Call attempt and left message on personal cell voicemail that INR is due. My call back # [phone] if questions.  Lyudmila Good RN

## 2020-02-17 ENCOUNTER — TRANSFERRED RECORDS (OUTPATIENT)
Dept: HEALTH INFORMATION MANAGEMENT | Facility: CLINIC | Age: 50
End: 2020-02-17

## 2020-02-17 LAB — INR PPP: 2.7 (ref 0.9–1.1)

## 2020-02-19 ENCOUNTER — ANTICOAGULATION THERAPY VISIT (OUTPATIENT)
Dept: FAMILY MEDICINE | Facility: CLINIC | Age: 50
End: 2020-02-19
Payer: COMMERCIAL

## 2020-02-19 DIAGNOSIS — I82.402 DEEP VEIN THROMBOSIS (DVT) OF LEFT LOWER EXTREMITY, UNSPECIFIED CHRONICITY, UNSPECIFIED VEIN (H): ICD-10-CM

## 2020-02-19 DIAGNOSIS — Z79.01 LONG TERM CURRENT USE OF ANTICOAGULANT THERAPY: ICD-10-CM

## 2020-02-19 PROCEDURE — 99207 ZZC NO CHARGE NURSE ONLY: CPT | Performed by: PHYSICIAN ASSISTANT

## 2020-02-19 NOTE — TELEPHONE ENCOUNTER
INR 2.7 on  2/17/20 per home monitor and confirmed by fax from [employer]. Therapeutic. Left message on patient's voicemail to call me back if any changes or concerns. Plan continue same warfarin dose and check INR in 2 wks. See anticoagulation encounter. Lyudmila Good RN

## 2020-02-19 NOTE — PROGRESS NOTES
ANTICOAGULATION FOLLOW-UP CLINIC VISIT    Patient Name:  Donato De Leon  Date:  2020  Contact Type:  Telephone    SUBJECTIVE:  Patient Findings     Comments:   INR 2.7 on  20 per home monitor and confirmed by fax from [employer]. Therapeutic. Left message on patient's voicemail to call me back if any changes or concerns. Plan continue same warfarin dose and check INR in 2 wks.        Clinical Outcomes     Negatives:   Major bleeding event, Thromboembolic event, Anticoagulation-related hospital admission, Anticoagulation-related ED visit, Anticoagulation-related fatality    Comments:   INR 2.7 on  20 per home monitor and confirmed by fax from [employer]. Therapeutic. Left message on patient's voicemail to call me back if any changes or concerns. Plan continue same warfarin dose and check INR in 2 wks.           OBJECTIVE    INR   Date Value Ref Range Status   2020 2.7 (A) 0.90 - 1.10 Final       ASSESSMENT / PLAN  INR assessment THER    Recheck INR In: 2 WEEKS    INR Location Home INR      Anticoagulation Summary  As of 2020    INR goal:   2.0-3.0   TTR:   26.2 % (1 y)   INR used for dosin.7 (2020)   Warfarin maintenance plan:   15 mg (5 mg x 3) every Wed; 10 mg (5 mg x 2) all other days   Full warfarin instructions:   15 mg every Wed; 10 mg all other days   Weekly warfarin total:   75 mg   No change documented:   Lyudmila Good RN   Plan last modified:   Lyudmila Good RN (2019)   Next INR check:   3/4/2020   Priority:   Maintenance   Target end date:       Indications    Deep vein thrombosis (DVT) (H) [I82.409] [I82.409]  Long-term (current) use of anticoagulants [Z79.01] [Z79.01]             Anticoagulation Episode Summary     INR check location:       Preferred lab:       Send INR reminders to:   GISEL PERSAUD    Comments:         Anticoagulation Care Providers     Provider Role Specialty Phone number    Carter Skinner PA-C Responsible Physician Assistant  [phone]            See the Encounter Report to view Anticoagulation Flowsheet and Dosing Calendar (Go to Encounters tab in chart review, and find the Anticoagulation Therapy Visit)        Lyudmila Good RN

## 2020-03-05 ENCOUNTER — TRANSFERRED RECORDS (OUTPATIENT)
Dept: HEALTH INFORMATION MANAGEMENT | Facility: CLINIC | Age: 50
End: 2020-03-05

## 2020-03-13 ENCOUNTER — TELEPHONE (OUTPATIENT)
Dept: FAMILY MEDICINE | Facility: CLINIC | Age: 50
End: 2020-03-13

## 2020-03-13 NOTE — TELEPHONE ENCOUNTER
Due for INR. Patient checks with home monitor. Call attempt and left message for patient to call me back. [phone] Lyudmila Good RN

## 2020-03-15 ENCOUNTER — TRANSFERRED RECORDS (OUTPATIENT)
Dept: HEALTH INFORMATION MANAGEMENT | Facility: CLINIC | Age: 50
End: 2020-03-15

## 2020-03-15 LAB — INR PPP: 3.4 (ref 0.9–1.1)

## 2020-03-17 NOTE — TELEPHONE ENCOUNTER
Received results from [employer] home monitor. INR 3.4 on 3/15/20. Left message for patient to call me back with update. Lyudmila Good RN

## 2020-03-18 ENCOUNTER — ANTICOAGULATION THERAPY VISIT (OUTPATIENT)
Dept: FAMILY MEDICINE | Facility: CLINIC | Age: 50
End: 2020-03-18
Payer: COMMERCIAL

## 2020-03-18 DIAGNOSIS — I82.402 DEEP VEIN THROMBOSIS (DVT) OF LEFT LOWER EXTREMITY, UNSPECIFIED CHRONICITY, UNSPECIFIED VEIN (H): ICD-10-CM

## 2020-03-18 DIAGNOSIS — Z79.01 LONG TERM CURRENT USE OF ANTICOAGULANT THERAPY: ICD-10-CM

## 2020-03-18 PROCEDURE — G0250 MD INR TEST REVIE INTER MGMT: HCPCS | Performed by: PHYSICIAN ASSISTANT

## 2020-03-18 NOTE — TELEPHONE ENCOUNTER
Patient returned call. Reports no changes or new concerns. Reports no bleeding or bruising. Reports will continue same warfarin dose and recheck INR in 2 weeks. See anticoagulation encounter. Lyudmila Good RN

## 2020-03-18 NOTE — PROGRESS NOTES
ANTICOAGULATION FOLLOW-UP CLINIC VISIT    Patient Name:  Doanto De Leon  Date:  3/18/2020  Contact Type:  Telephone    SUBJECTIVE:  Patient Findings     Comments:   Faxed results from [employer]. Patient returned call. Reports no changes or new concerns. Reports no bleeding or bruising. Reports will continue same warfarin dose and recheck INR in 2 weeks.         Clinical Outcomes     Comments:   Faxed results from [employer]. Patient returned call. Reports no changes or new concerns. Reports no bleeding or bruising. Reports will continue same warfarin dose and recheck INR in 2 weeks.            OBJECTIVE    INR   Date Value Ref Range Status   03/15/2020 3.4 (A) 0.90 - 1.10 Final       ASSESSMENT / PLAN  INR assessment SUPRA    Recheck INR In: 2 WEEKS    INR Location Home INR    Billed home INR Yes      Anticoagulation Summary  As of 3/18/2020    INR goal:   2.0-3.0   TTR:   29.4 % (1 y)   INR used for dosing:   3.4! (3/15/2020)   Warfarin maintenance plan:   15 mg (5 mg x 3) every Wed; 10 mg (5 mg x 2) all other days   Full warfarin instructions:   15 mg every Wed; 10 mg all other days   Weekly warfarin total:   75 mg   No change documented:   Lyudmila Good RN   Plan last modified:   Lyudmila Good RN (8/22/2019)   Next INR check:   3/30/2020   Priority:   High   Target end date:       Indications    Deep vein thrombosis (DVT) (H) [I82.409] [I82.409]  Long-term (current) use of anticoagulants [Z79.01] [Z79.01]             Anticoagulation Episode Summary     INR check location:       Preferred lab:       Send INR reminders to:   GISEL PERSAUD    Comments:         Anticoagulation Care Providers     Provider Role Specialty Phone number    Carter Skinner PA-C Responsible Physician Assistant [phone]            See the Encounter Report to view Anticoagulation Flowsheet and Dosing Calendar (Go to Encounters tab in chart review, and find the Anticoagulation Therapy Visit)    Dosage adjustment made based  on physician directed care plan.    Lyudmila Good RN

## 2020-04-07 ENCOUNTER — TELEPHONE (OUTPATIENT)
Dept: FAMILY MEDICINE | Facility: CLINIC | Age: 50
End: 2020-04-07

## 2020-04-07 ENCOUNTER — TRANSFERRED RECORDS (OUTPATIENT)
Dept: HEALTH INFORMATION MANAGEMENT | Facility: CLINIC | Age: 50
End: 2020-04-07

## 2020-04-07 NOTE — TELEPHONE ENCOUNTER
Patient overdue for checking INR with home monitor. Checked with [employer] and last INR reported was 3/15/20. Left message oh voicemail for patient to call me back at [phone]. Lyudmila Good RN

## 2020-04-08 LAB — INR PPP: 2.7 (ref 0.9–1.1)

## 2020-04-08 NOTE — TELEPHONE ENCOUNTER
Received results of home monitor. INR 2.7 on 4/7/20. This is therapeutic. Call attempt to discuss results and left message for patient to call me back. Lyudmila Good RN

## 2020-04-09 ENCOUNTER — ANTICOAGULATION THERAPY VISIT (OUTPATIENT)
Dept: NURSING | Facility: CLINIC | Age: 50
End: 2020-04-09
Payer: COMMERCIAL

## 2020-04-09 DIAGNOSIS — Z79.01 LONG TERM CURRENT USE OF ANTICOAGULANT THERAPY: ICD-10-CM

## 2020-04-09 DIAGNOSIS — I82.402 DEEP VEIN THROMBOSIS (DVT) OF LEFT LOWER EXTREMITY, UNSPECIFIED CHRONICITY, UNSPECIFIED VEIN (H): ICD-10-CM

## 2020-04-09 PROCEDURE — 99207 ZZC NO CHARGE NURSE ONLY: CPT

## 2020-04-09 NOTE — PROGRESS NOTES
ANTICOAGULATION FOLLOW-UP CLINIC VISIT    Patient Name:  Donato De Leon  Date:  2020  Contact Type:  Telephone    SUBJECTIVE:  Patient Findings     Comments:   INR per home monitor therapeutic. Patient left message on my voicemail reporting no new concerns or changes and verified warfarin dose and will continue same        Clinical Outcomes     Negatives:   Major bleeding event, Thromboembolic event, Anticoagulation-related hospital admission, Anticoagulation-related ED visit, Anticoagulation-related fatality    Comments:   INR per home monitor therapeutic. Patient left message on my voicemail reporting no new concerns or changes and verified warfarin dose and will continue same           OBJECTIVE    INR   Date Value Ref Range Status   2020 2.7 (A) 0.90 - 1.10 Final       ASSESSMENT / PLAN  INR assessment THER    Recheck INR In: 2 WEEKS    INR Location Home INR    Billed home INR No      Anticoagulation Summary  As of 2020    INR goal:   2.0-3.0   TTR:   32.1 % (1 y)   INR used for dosin.7 (2020)   Warfarin maintenance plan:   15 mg (5 mg x 3) every Wed; 10 mg (5 mg x 2) all other days   Full warfarin instructions:   15 mg every Wed; 10 mg all other days   Weekly warfarin total:   75 mg   No change documented:   Lyudmila Good RN   Plan last modified:   Lyudmila Good RN (2019)   Next INR check:   2020   Priority:   High   Target end date:       Indications    Deep vein thrombosis (DVT) (H) [I82.409] [I82.409]  Long-term (current) use of anticoagulants [Z79.01] [Z79.01]             Anticoagulation Episode Summary     INR check location:       Preferred lab:       Send INR reminders to:   GISEL PERSAUD    Comments:         Anticoagulation Care Providers     Provider Role Specialty Phone number    Carter Skinner PA-C Responsible Physician Assistant [phone]            See the Encounter Report to view Anticoagulation Flowsheet and Dosing Calendar (Go to  Encounters tab in chart review, and find the Anticoagulation Therapy Visit)        Lyudmila Good RN

## 2020-04-09 NOTE — TELEPHONE ENCOUNTER
Patient left message on my voicemail reporting no new concerns or changes and verified warfarin dose and will continue same. See anticoagulation encounter. Lyudmila Good RN

## 2020-04-15 ENCOUNTER — TRANSFERRED RECORDS (OUTPATIENT)
Dept: HEALTH INFORMATION MANAGEMENT | Facility: CLINIC | Age: 50
End: 2020-04-15

## 2020-05-05 ENCOUNTER — TELEPHONE (OUTPATIENT)
Dept: FAMILY MEDICINE | Facility: CLINIC | Age: 50
End: 2020-05-05

## 2020-05-08 NOTE — TELEPHONE ENCOUNTER
Message from patient on voicemail. Reports ran out of supplies for home monitor after his last test showed error. Has ordered more supplies and will test as soon as they arrive. Reports has been well and no concerns or changes and anticipates INR is stable. Will wait for test results when he gets supply. Lyudmila Good RN

## 2020-05-11 ENCOUNTER — TRANSFERRED RECORDS (OUTPATIENT)
Dept: HEALTH INFORMATION MANAGEMENT | Facility: CLINIC | Age: 50
End: 2020-05-11

## 2020-05-11 DIAGNOSIS — I82.402 DEEP VEIN THROMBOSIS (DVT) OF LEFT LOWER EXTREMITY, UNSPECIFIED CHRONICITY, UNSPECIFIED VEIN (H): ICD-10-CM

## 2020-05-11 LAB — INR PPP: 2.6 (ref 0.9–1.1)

## 2020-05-12 ENCOUNTER — TELEPHONE (OUTPATIENT)
Dept: FAMILY MEDICINE | Facility: CLINIC | Age: 50
End: 2020-05-12

## 2020-05-12 ENCOUNTER — ANTICOAGULATION THERAPY VISIT (OUTPATIENT)
Dept: NURSING | Facility: CLINIC | Age: 50
End: 2020-05-12
Payer: COMMERCIAL

## 2020-05-12 DIAGNOSIS — I82.402 DEEP VEIN THROMBOSIS (DVT) OF LEFT LOWER EXTREMITY, UNSPECIFIED CHRONICITY, UNSPECIFIED VEIN (H): ICD-10-CM

## 2020-05-12 DIAGNOSIS — Z79.01 LONG TERM CURRENT USE OF ANTICOAGULANT THERAPY: ICD-10-CM

## 2020-05-12 DIAGNOSIS — I82.402 DEEP VEIN THROMBOSIS (DVT) OF LEFT LOWER EXTREMITY, UNSPECIFIED CHRONICITY, UNSPECIFIED VEIN (H): Primary | ICD-10-CM

## 2020-05-12 PROCEDURE — 99207 ZZC NO CHARGE NURSE ONLY: CPT

## 2020-05-12 RX ORDER — WARFARIN SODIUM 5 MG/1
TABLET ORAL
Qty: 195 TABLET | Refills: 0 | Status: SHIPPED | OUTPATIENT
Start: 2020-05-12 | End: 2020-08-10

## 2020-05-12 NOTE — TELEPHONE ENCOUNTER
Has the patient previously taken warfarin? yes  If yes, for what indication? DVT    Does the patient have any of the following indications for a higher range of 2.5-3.5:    Mitral position mechanical valve? no    John-Shiley, Ball and Cage or Monoleaflet valve (regardless of position) no    Other (if yes, please explain) no    Annual anticoagulation referral needed. Order pended.  Thank you. Lyudmila Good RN

## 2020-05-12 NOTE — PROGRESS NOTES
ANTICOAGULATION FOLLOW-UP CLINIC VISIT    Patient Name:  Donato De Leon  Date:  2020  Contact Type:  Telephone    SUBJECTIVE:  Patient Findings     Comments:   Therapeutic. Left message for patient to call if any changes or concerns and continue same dose if no changes.         Clinical Outcomes     Negatives:   Major bleeding event, Thromboembolic event, Anticoagulation-related hospital admission, Anticoagulation-related ED visit, Anticoagulation-related fatality    Comments:   Therapeutic. Left message for patient to call if any changes or concerns and continue same dose if no changes.            OBJECTIVE    Recent labs: (last 7 days)     20   INR 2.6*       ASSESSMENT / PLAN  INR assessment THER    Recheck INR In: 4 WEEKS    INR Location Home INR    Billed home INR No      Anticoagulation Summary  As of 2020    INR goal:   2.0-3.0   TTR:   39.4 % (1 y)   INR used for dosin.6 (2020)   Warfarin maintenance plan:   15 mg (5 mg x 3) every Wed; 10 mg (5 mg x 2) all other days   Full warfarin instructions:   15 mg every Wed; 10 mg all other days   Weekly warfarin total:   75 mg   No change documented:   Lyudmila Good RN   Plan last modified:   Lyudmila Good RN (2019)   Next INR check:   2020   Priority:   High   Target end date:       Indications    Deep vein thrombosis (DVT) (H) [I82.409] [I82.409]  Long-term (current) use of anticoagulants [Z79.01] [Z79.01]             Anticoagulation Episode Summary     INR check location:       Preferred lab:       Send INR reminders to:   GISEL PERSAUD    Comments:         Anticoagulation Care Providers     Provider Role Specialty Phone number    Opcammy, Carter Romo PA-C Responsible Physician Assistant [phone]            See the Encounter Report to view Anticoagulation Flowsheet and Dosing Calendar (Go to Encounters tab in chart review, and find the Anticoagulation Therapy Visit)        Lyudmila Good RN

## 2020-06-02 LAB — INR PPP: 2.2 (ref 0.9–1.1)

## 2020-06-03 ENCOUNTER — TELEPHONE (OUTPATIENT)
Dept: FAMILY MEDICINE | Facility: CLINIC | Age: 50
End: 2020-06-03

## 2020-06-03 ENCOUNTER — ANTICOAGULATION THERAPY VISIT (OUTPATIENT)
Dept: NURSING | Facility: CLINIC | Age: 50
End: 2020-06-03
Payer: COMMERCIAL

## 2020-06-03 DIAGNOSIS — I82.402 DEEP VEIN THROMBOSIS (DVT) OF LEFT LOWER EXTREMITY, UNSPECIFIED CHRONICITY, UNSPECIFIED VEIN (H): ICD-10-CM

## 2020-06-03 DIAGNOSIS — Z79.01 LONG TERM CURRENT USE OF ANTICOAGULANT THERAPY: ICD-10-CM

## 2020-06-03 PROCEDURE — G0250 MD INR TEST REVIE INTER MGMT: HCPCS

## 2020-06-03 NOTE — PROGRESS NOTES
ANTICOAGULATION FOLLOW-UP CLINIC VISIT    Patient Name:  Donato De Leon  Date:  6/3/2020  Contact Type:  Telephone    SUBJECTIVE:  Patient Findings     Comments:   Therapeutic. Message sent to patient to call me if any changes or concerns or updates. Plan to continue same dose if no changes.         Clinical Outcomes     Comments:   Therapeutic. Message sent to patient to call me if any changes or concerns or updates. Plan to continue same dose if no changes.            OBJECTIVE    Recent labs: (last 7 days)     20   INR 2.2*       ASSESSMENT / PLAN  INR assessment THER    Recheck INR In: 3 WEEKS    INR Location Home INR    Billed home INR Yes      Anticoagulation Summary  As of 6/3/2020    INR goal:   2.0-3.0   TTR:   45.4 % (1 y)   INR used for dosin.2 (2020)   Warfarin maintenance plan:   15 mg (5 mg x 3) every Wed; 10 mg (5 mg x 2) all other days   Full warfarin instructions:   15 mg every Wed; 10 mg all other days   Weekly warfarin total:   75 mg   No change documented:   Lyudmila Good RN   Plan last modified:   Lyudmila Good RN (2019)   Next INR check:   2020   Priority:   High   Target end date:   Indefinite    Indications    Deep vein thrombosis (DVT) (H) [I82.409] [I82.409]  Long-term (current) use of anticoagulants [Z79.01] [Z79.01]  Deep vein thrombosis (DVT) of left lower extremity  unspecified chronicity  unspecified vein (H) [I82.402]             Anticoagulation Episode Summary     INR check location:       Preferred lab:       Send INR reminders to:   GISEL PERSAUD    Comments:         Anticoagulation Care Providers     Provider Role Specialty Phone number    Carter Skinner PA-C Referring Physician Assistant [phone]            See the Encounter Report to view Anticoagulation Flowsheet and Dosing Calendar (Go to Encounters tab in chart review, and find the Anticoagulation Therapy Visit)    Dosage adjustment made based on physician directed care  plan.    Lyudmila Good RN

## 2020-06-03 NOTE — TELEPHONE ENCOUNTER
Home monitor results for INR received and therapeutic at 2.2. left message for patient to call me. Lyudmila Good RN

## 2020-06-04 ENCOUNTER — TRANSFERRED RECORDS (OUTPATIENT)
Dept: HEALTH INFORMATION MANAGEMENT | Facility: CLINIC | Age: 50
End: 2020-06-04

## 2020-06-25 ENCOUNTER — TRANSFERRED RECORDS (OUTPATIENT)
Dept: HEALTH INFORMATION MANAGEMENT | Facility: CLINIC | Age: 50
End: 2020-06-25

## 2020-06-25 LAB — INR PPP: 3.8 (ref 0.9–1.1)

## 2020-06-29 ENCOUNTER — TELEPHONE (OUTPATIENT)
Dept: FAMILY MEDICINE | Facility: CLINIC | Age: 50
End: 2020-06-29

## 2020-06-30 ENCOUNTER — ANTICOAGULATION THERAPY VISIT (OUTPATIENT)
Dept: NURSING | Facility: CLINIC | Age: 50
End: 2020-06-30
Payer: COMMERCIAL

## 2020-06-30 DIAGNOSIS — Z79.01 LONG TERM CURRENT USE OF ANTICOAGULANT THERAPY: ICD-10-CM

## 2020-06-30 DIAGNOSIS — I82.402 DEEP VEIN THROMBOSIS (DVT) OF LEFT LOWER EXTREMITY, UNSPECIFIED CHRONICITY, UNSPECIFIED VEIN (H): ICD-10-CM

## 2020-06-30 PROCEDURE — 99207 ZZC NO CHARGE NURSE ONLY: CPT

## 2020-06-30 NOTE — TELEPHONE ENCOUNTER
Message received from patient stating he had no changes or concerns and was unsure why INR was supra. See anticoagulation encounter. Lyudmila Good RN

## 2020-07-15 ENCOUNTER — TELEPHONE (OUTPATIENT)
Dept: FAMILY MEDICINE | Facility: CLINIC | Age: 50
End: 2020-07-15

## 2020-07-15 NOTE — TELEPHONE ENCOUNTER
Overdue for INR check. Uses home monitor. Left message on voicemail reminder to check INR. Can call me if questions. Lyudmila Good RN

## 2020-07-16 ENCOUNTER — ANTICOAGULATION THERAPY VISIT (OUTPATIENT)
Dept: NURSING | Facility: CLINIC | Age: 50
End: 2020-07-16
Payer: COMMERCIAL

## 2020-07-16 ENCOUNTER — TRANSFERRED RECORDS (OUTPATIENT)
Dept: HEALTH INFORMATION MANAGEMENT | Facility: CLINIC | Age: 50
End: 2020-07-16

## 2020-07-16 DIAGNOSIS — I82.402 DEEP VEIN THROMBOSIS (DVT) OF LEFT LOWER EXTREMITY, UNSPECIFIED CHRONICITY, UNSPECIFIED VEIN (H): ICD-10-CM

## 2020-07-16 DIAGNOSIS — Z79.01 LONG TERM CURRENT USE OF ANTICOAGULANT THERAPY: ICD-10-CM

## 2020-07-16 LAB — INR PPP: 2.4 (ref 0.9–1.1)

## 2020-07-16 PROCEDURE — 99207 ZZC NO CHARGE NURSE ONLY: CPT

## 2020-07-16 NOTE — PROGRESS NOTES
ANTICOAGULATION FOLLOW-UP CLINIC VISIT    Patient Name:  Donato De Leon  Date:  2020  Contact Type:  Telephone    SUBJECTIVE:  Patient Findings     Comments:   Results from home monitor therapeutic. Left message for patient to call me if any changes, updates or concerns. If none, to continue same warfarin dose and check INR every 2 wks as ordered with home monitor.        Clinical Outcomes     Comments:   Results from home monitor therapeutic. Left message for patient to call me if any changes, updates or concerns. If none, to continue same warfarin dose and check INR every 2 wks as ordered with home monitor.           OBJECTIVE    Recent labs: (last 7 days)     20   INR 2.4*       ASSESSMENT / PLAN  INR assessment THER    Recheck INR In: 2 WEEKS    INR Location Home INR    Billed home INR No      Anticoagulation Summary  As of 2020    INR goal:   2.0-3.0   TTR:   50.9 % (1 y)   INR used for dosin.4 (2020)   Warfarin maintenance plan:   15 mg (5 mg x 3) every Wed; 10 mg (5 mg x 2) all other days   Full warfarin instructions:   15 mg every Wed; 10 mg all other days   Weekly warfarin total:   75 mg   No change documented:   Lyudmila Good RN   Plan last modified:   Lyudmila Good RN (2019)   Next INR check:   2020   Priority:   High   Target end date:   Indefinite    Indications    Deep vein thrombosis (DVT) (H) [I82.409] [I82.409]  Long-term (current) use of anticoagulants [Z79.01] [Z79.01]  Deep vein thrombosis (DVT) of left lower extremity  unspecified chronicity  unspecified vein (H) [I82.402]             Anticoagulation Episode Summary     INR check location:       Preferred lab:       Send INR reminders to:   GISEL PERSAUD    Comments:         Anticoagulation Care Providers     Provider Role Specialty Phone number    Carter Skinner PA-C Referring Physician Assistant [phone]            See the Encounter Report to view Anticoagulation Flowsheet and  Dosing Calendar (Go to Encounters tab in chart review, and find the Anticoagulation Therapy Visit)    Dosage adjustment made based on physician directed care plan.    Lyudmila Good RN

## 2020-07-16 NOTE — PROGRESS NOTES
ANTICOAGULATION FOLLOW-UP CLINIC VISIT    Patient Name:  Donato De Leon  Date:  6/30/2020  Contact Type:  Telephone    SUBJECTIVE:  Patient Findings     Comments:   Patient denies any identifiable changes that caused the supratherapeutic INR. Message received from patient on voicemail 6/29.  Left message for patient to recheck in 2wks. Call if any concern or change.          Clinical Outcomes     Comments:   Patient denies any identifiable changes that caused the supratherapeutic INR. Message received from patient on voicemail 6/29.  Left message for patient to recheck in 2wks. Call if any concern or change.             OBJECTIVE    Recent labs: (last 7 days)     06/25/20   INR 3.8*       ASSESSMENT / PLAN  INR assessment SUPRA    Recheck INR In: 2 WEEKS    INR Location Home INR    Billed home INR No      Anticoagulation Summary  As of 6/30/2020    INR goal:   2.0-3.0   TTR:   49.1 % (1 y)   INR used for dosing:   3.8! (6/25/2020)   Warfarin maintenance plan:   15 mg (5 mg x 3) every Wed; 10 mg (5 mg x 2) all other days   Full warfarin instructions:   15 mg every Wed; 10 mg all other days   Weekly warfarin total:   75 mg   No change documented:   Lyudmila Good RN   Plan last modified:   Lyudmila Good RN (8/22/2019)   Next INR check:   7/9/2020   Priority:   High   Target end date:   Indefinite    Indications    Deep vein thrombosis (DVT) (H) [I82.409] [I82.409]  Long-term (current) use of anticoagulants [Z79.01] [Z79.01]  Deep vein thrombosis (DVT) of left lower extremity  unspecified chronicity  unspecified vein (H) [I82.402]             Anticoagulation Episode Summary     INR check location:       Preferred lab:       Send INR reminders to:   GISEL PERSAUD    Comments:         Anticoagulation Care Providers     Provider Role Specialty Phone number    Carter Skinner PA-C Referring Physician Assistant [phone]            See the Encounter Report to view Anticoagulation Flowsheet and Dosing  Calendar (Go to Encounters tab in chart review, and find the Anticoagulation Therapy Visit)    Dosage adjustment made based on physician directed care plan.    Lyudmila Good RN                  No

## 2020-07-17 ENCOUNTER — TRANSFERRED RECORDS (OUTPATIENT)
Dept: HEALTH INFORMATION MANAGEMENT | Facility: CLINIC | Age: 50
End: 2020-07-17

## 2020-07-22 ENCOUNTER — TRANSFERRED RECORDS (OUTPATIENT)
Dept: HEALTH INFORMATION MANAGEMENT | Facility: CLINIC | Age: 50
End: 2020-07-22

## 2020-07-22 LAB
ALT SERPL-CCNC: 38 U/L (ref 0–78)
AST SERPL-CCNC: 22 U/L (ref 0–37)

## 2020-07-29 ENCOUNTER — TRANSFERRED RECORDS (OUTPATIENT)
Dept: HEALTH INFORMATION MANAGEMENT | Facility: CLINIC | Age: 50
End: 2020-07-29

## 2020-07-30 ENCOUNTER — TELEPHONE (OUTPATIENT)
Dept: FAMILY MEDICINE | Facility: CLINIC | Age: 50
End: 2020-07-30

## 2020-07-30 ENCOUNTER — TRANSFERRED RECORDS (OUTPATIENT)
Dept: HEALTH INFORMATION MANAGEMENT | Facility: CLINIC | Age: 50
End: 2020-07-30

## 2020-07-30 ENCOUNTER — ANTICOAGULATION THERAPY VISIT (OUTPATIENT)
Dept: NURSING | Facility: CLINIC | Age: 50
End: 2020-07-30
Payer: COMMERCIAL

## 2020-07-30 DIAGNOSIS — Z79.01 LONG TERM CURRENT USE OF ANTICOAGULANT THERAPY: ICD-10-CM

## 2020-07-30 DIAGNOSIS — I82.402 DEEP VEIN THROMBOSIS (DVT) OF LEFT LOWER EXTREMITY, UNSPECIFIED CHRONICITY, UNSPECIFIED VEIN (H): ICD-10-CM

## 2020-07-30 LAB — INR PPP: 1.8 (ref 0.9–1.1)

## 2020-07-30 PROCEDURE — 99207 ZZC NO CHARGE NURSE ONLY: CPT

## 2020-07-30 NOTE — TELEPHONE ENCOUNTER
Received INR results from home monitor = 1.8    Call to patient - Left message on answering machine for patient to call back. [phone]  Chanelle BAUTISTAN, RN, CPN

## 2020-07-30 NOTE — TELEPHONE ENCOUNTER
Left message on answering machine for patient to call back to [phone].  Makenzie Jeffries BSN, RN

## 2020-07-31 ENCOUNTER — ANTICOAGULATION THERAPY VISIT (OUTPATIENT)
Dept: NURSING | Facility: CLINIC | Age: 50
End: 2020-07-31

## 2020-07-31 DIAGNOSIS — I82.402 DEEP VEIN THROMBOSIS (DVT) OF LEFT LOWER EXTREMITY, UNSPECIFIED CHRONICITY, UNSPECIFIED VEIN (H): ICD-10-CM

## 2020-07-31 DIAGNOSIS — Z79.01 LONG TERM CURRENT USE OF ANTICOAGULANT THERAPY: ICD-10-CM

## 2020-07-31 LAB — INR PPP: 1.8 (ref 0.9–1.1)

## 2020-07-31 NOTE — PROGRESS NOTES
ANTICOAGULATION FOLLOW-UP CLINIC VISIT    Patient Name:  Donato De Leon  Date:  2020  Contact Type:  Telephone    SUBJECTIVE:  Patient Findings     Positives:   Change in diet/appetite    Comments:   Pt is going on vacation and so was eating out the fridge, he ate more vegetables. He will be resuming normal diet.         Clinical Outcomes     Comments:   Pt is going on vacation and so was eating out the fridge, he ate more vegetables. He will be resuming normal diet.            OBJECTIVE    Recent labs: (last 7 days)     20   INR 1.8*       ASSESSMENT / PLAN  INR assessment SUB    Recheck INR In: 2 WEEKS    INR Location Home INR      Anticoagulation Summary  As of 2020    INR goal:   2.0-3.0   TTR:   53.5 % (1 y)   INR used for dosin.8! (2020)   Warfarin maintenance plan:   15 mg (5 mg x 3) every Wed; 10 mg (5 mg x 2) all other days   Full warfarin instructions:   : 15 mg; Otherwise 15 mg every Wed; 10 mg all other days   Weekly warfarin total:   75 mg   Plan last modified:   Lyudmila Good RN (2019)   Next INR check:   2020   Priority:   High   Target end date:   Indefinite    Indications    Deep vein thrombosis (DVT) (H) [I82.409] [I82.409]  Long-term (current) use of anticoagulants [Z79.01] [Z79.01]  Deep vein thrombosis (DVT) of left lower extremity  unspecified chronicity  unspecified vein (H) [I82.402]             Anticoagulation Episode Summary     INR check location:       Preferred lab:       Send INR reminders to:   GISEL PERSAUD    Comments:         Anticoagulation Care Providers     Provider Role Specialty Phone number    Carter Skinner PA-C Referring Physician Assistant [phone]            See the Encounter Report to view Anticoagulation Flowsheet and Dosing Calendar (Go to Encounters tab in chart review, and find the Anticoagulation Therapy Visit)    Makenzie Jeffries RN

## 2020-08-09 DIAGNOSIS — I82.402 DEEP VEIN THROMBOSIS (DVT) OF LEFT LOWER EXTREMITY, UNSPECIFIED CHRONICITY, UNSPECIFIED VEIN (H): ICD-10-CM

## 2020-08-10 RX ORDER — WARFARIN SODIUM 5 MG/1
TABLET ORAL
Qty: 195 TABLET | Refills: 0 | Status: SHIPPED | OUTPATIENT
Start: 2020-08-10 | End: 2020-12-17

## 2020-08-15 LAB — INR PPP: 5.8 (ref 0.9–1.1)

## 2020-08-18 ENCOUNTER — TELEPHONE (OUTPATIENT)
Dept: FAMILY MEDICINE | Facility: CLINIC | Age: 50
End: 2020-08-18

## 2020-08-18 ENCOUNTER — ANTICOAGULATION THERAPY VISIT (OUTPATIENT)
Dept: NURSING | Facility: CLINIC | Age: 50
End: 2020-08-18
Payer: COMMERCIAL

## 2020-08-18 DIAGNOSIS — I82.402 DEEP VEIN THROMBOSIS (DVT) OF LEFT LOWER EXTREMITY, UNSPECIFIED CHRONICITY, UNSPECIFIED VEIN (H): ICD-10-CM

## 2020-08-18 DIAGNOSIS — Z79.01 LONG TERM CURRENT USE OF ANTICOAGULANT THERAPY: ICD-10-CM

## 2020-08-18 LAB — INR PPP: 4.4 (ref 0.9–1.1)

## 2020-08-18 PROCEDURE — 99207 ZZC NO CHARGE NURSE ONLY: CPT

## 2020-08-18 NOTE — TELEPHONE ENCOUNTER
Spoke with patient on phone. States INR checked today was 4.4.  He has not adjusted his dose of warfarin. Injured foot and fx toe about 10 days ago and was using tylenol for pain and vicodin for several nights to sleep. Wearing boot on foot and has significant bruising on foot. diet may have varied a bit with less salad while on vacation and a few more alcohol drinks but not significant change. He already took warfarin dose today. Advised to hold warfarin tomorrow and increase vit k food in diet. He will recheck INR on Sunday at home. See anticoagulation encounter. Lyudmila Good RN

## 2020-08-18 NOTE — PROGRESS NOTES
ANTICOAGULATION FOLLOW-UP CLINIC VISIT    Patient Name:  Dnoato De Leon  Date:  2020  Contact Type:  Telephone    SUBJECTIVE:  Patient Findings     Comments:   Received faxed results today of INR 5.8 done on 8/15/20. Spoke with patient on phone. States INR checked again today was 4.4.  He has not adjusted his dose of warfarin. Injured foot and fx toe about 10 days ago and was using tylenol for pain and vicodin for several nights to sleep. Wearing boot on foot and has significant bruising on foot. diet may have varied a bit with less salad while on vacation and a few more alcohol drinks but not significant change. He already took warfarin dose today. Advised to hold warfarin tomorrow and increase vit k food in diet. He will recheck INR on  at home. Also plans to have colonoscopy on  and message in chart to provider for hold orders.         Clinical Outcomes     Comments:   Received faxed results today of INR 5.8 done on 8/15/20. Spoke with patient on phone. States INR checked again today was 4.4.  He has not adjusted his dose of warfarin. Injured foot and fx toe about 10 days ago and was using tylenol for pain and vicodin for several nights to sleep. Wearing boot on foot and has significant bruising on foot. diet may have varied a bit with less salad while on vacation and a few more alcohol drinks but not significant change. He already took warfarin dose today. Advised to hold warfarin tomorrow and increase vit k food in diet. He will recheck INR on  at home. Also plans to have colonoscopy on  and message in chart to provider for hold orders.            OBJECTIVE    Recent labs: (last 7 days)     20   INR 4.4*       ASSESSMENT / PLAN  INR assessment SUPRA    Recheck INR In: 5 DAYS    INR Location Home INR    Billed home INR No      Anticoagulation Summary  As of 2020    INR goal:   2.0-3.0   TTR:   51.5 % (1 y)   INR used for dosin.4! (2020)   Warfarin maintenance plan:    15 mg (5 mg x 3) every Wed; 10 mg (5 mg x 2) all other days   Full warfarin instructions:   8/19: Hold; Otherwise 15 mg every Wed; 10 mg all other days   Weekly warfarin total:   75 mg   Plan last modified:   Lyudmila Good RN (8/22/2019)   Next INR check:   8/23/2020   Priority:   High   Target end date:   Indefinite    Indications    Deep vein thrombosis (DVT) (H) [I82.409] [I82.409]  Long-term (current) use of anticoagulants [Z79.01] [Z79.01]  Deep vein thrombosis (DVT) of left lower extremity  unspecified chronicity  unspecified vein (H) [I82.402]             Anticoagulation Episode Summary     INR check location:       Preferred lab:       Send INR reminders to:   GISEL PERSAUD    Comments:         Anticoagulation Care Providers     Provider Role Specialty Phone number    Carter Skinner PA-C Referring Physician Assistant [phone]            See the Encounter Report to view Anticoagulation Flowsheet and Dosing Calendar (Go to Encounters tab in chart review, and find the Anticoagulation Therapy Visit)    Dosage adjustment made based on physician directed care plan.    Lyudmila Good RN

## 2020-08-18 NOTE — TELEPHONE ENCOUNTER
Critical high result received per fax from [facility] on weekend. INR 5.8 on 8/15/20. Call attempt to patient to discuss and left message on voicemail to call me back. Lyudmila Good RN

## 2020-08-19 ENCOUNTER — TELEPHONE (OUTPATIENT)
Dept: FAMILY MEDICINE | Facility: CLINIC | Age: 50
End: 2020-08-19

## 2020-08-19 DIAGNOSIS — I82.402 DEEP VEIN THROMBOSIS (DVT) OF LEFT LOWER EXTREMITY, UNSPECIFIED CHRONICITY, UNSPECIFIED VEIN (H): Primary | ICD-10-CM

## 2020-08-19 DIAGNOSIS — Z79.01 LONG TERM CURRENT USE OF ANTICOAGULANT THERAPY: ICD-10-CM

## 2020-08-19 NOTE — TELEPHONE ENCOUNTER
See message 8/13/20  [facility] [phone].    Left message on vm for [facility] nurse to call me back, I left Carter GOYAL question on vm.  Makenzie BAUTISTAN, RN

## 2020-08-19 NOTE — TELEPHONE ENCOUNTER
----- Message from Herbie Paige PA-C sent at 8/19/2020  9:05 AM CDT -----  Hello everyone,    This is Herbie Paige PA-C, physician assistant at [facility]. You guys' message in regard to holding Warfarin for this patient for 4 days prior to colonoscopy somehow gets to me.     Unfortunately, this patient has not been seen at this clinic since 2017. So technically I can't provide any definitive recommendations for his upcoming colonoscopy.    However, just generally speaking for our patients who is on long term anticoagulation therapy with Coumadin and the venous thromboembolic event was more than 6 months ago, our plan for these group of patients is as follow:    1) Stop Coumadin 5 days prior to colonoscopy.    2) If biopsy or polypectomy was done, we usually start them on enoxaparin at 40 mg SubQ Q 24 hours along with starting warfarin the next day. Continue enoxaparin at 40 mg SubQ Q 24 hours for 48 hours, then switch over to enoxaparin at 1 mg/kg SubQ Q 12 hours to complete bridging back to warfarin.     3) If no biopsy or polypectomy was done, we usually have them use enoxaparin at 1 mg/kg SubQ Q 12 hours to bridge them back to warfarin starting the day after procedure.    Again I cannot give any specifics recommendation for this patient as this patient has failed to follow up with this clinic and we have not seen him since 2017.     Hope this helps.    Thanks    Herbie Paige PA-C, MPAS  Physician Assistant  [facility].

## 2020-08-19 NOTE — TELEPHONE ENCOUNTER
Richard REYEZ from [facility] called back and left vm on triage phone.  Richard REYEZ states typically with pt's history they would do biopsy's but there is no way to know until procedure is underway.  They may need to biopsy depending on what is found.    They are requesting hold orders for coumadin for 4 days.  And any bridging can be done by pmd.      Ok to leave the orders on her vm at [phone] ext [phone].    Makenzie BAUTISTAN, RN

## 2020-08-20 NOTE — TELEPHONE ENCOUNTER
INR RN please assist:         1) Stop Coumadin 5 days prior to colonoscopy.     2) If biopsy or polypectomy was done, we usually start them on enoxaparin at 40 mg SubQ Q 24 hours along with starting warfarin the next day. Continue enoxaparin at 40 mg SubQ Q 24 hours for 48 hours, then switch over to enoxaparin at 1 mg/kg SubQ Q 12 hours to complete bridging back to warfarin.

## 2020-08-24 ENCOUNTER — TELEPHONE (OUTPATIENT)
Dept: FAMILY MEDICINE | Facility: CLINIC | Age: 50
End: 2020-08-24

## 2020-08-24 LAB — INR PPP: 2.3 (ref 0.9–1.1)

## 2020-08-24 NOTE — TELEPHONE ENCOUNTER
Last warfarin dose: 09/03/2020 09/04/2020, NO warfarin  09/05/2020, NO warfarin  Est CrCl 110ml/min, OK for full dose Lovenox 120mg BID  09/06/2020, NO warfarin, begin enoxaparin injections into the abdomen every 12 hours (AM and PM)  09/07/2020, NO warfarin, enoxaparin injection into the abdomen every 12 hours (AM and PM)  09/08/2020, NO warfarin, enoxaparin injection into the abdomen AM only (no enoxaparin 24 hours prior to surgery)  09/09/2020, DAY OF PROCEDURE, NO enoxaparin. Restart warfarin 25mg (5 tabs) in the evening, unless instructed otherwise by the physician.  09/10/2020, Restart enoxaparin injections into the abdomen every 12 hours (AM and PM), unless instructed otherwise by the physician (if polyps removed 40mg subcutaneous Lovenox once daily), and 10mg (2 tabs) warfarin in the evening.   09/11/2020, Enoxaparin injection into the abdomen every 12 hours (AM and PM)(if polyps removed 40mg subcutaneous Lovenox once daily) and 10 mg (2 tabs) warfarin in the evening.  09/12/2020, Enoxaparin injection into the abdomen every 12 hours (AM and PM) and 10mg (2 tabs) warfarin in the evening.  09/13/2020, Enoxaparin injection into the abdomen every 12 hours (AM and PM) and 10mg (2 tabs) warfarin in the evening.   09/14/2020, Enoxaparin injection into the abdomen in the AM. Recheck INR at home or at the [facility] for further dosing instructions.   If you have any questions please call the Anticoagulation Clinic at [phone].

## 2020-08-24 NOTE — TELEPHONE ENCOUNTER
Makenzie at [facility] notified of orders for warfarin hold for 5 days and will be bridging with Lovenox. See detailed orders for bridging on 8/19/20 encounter.  Lyudmila Good RN

## 2020-08-24 NOTE — TELEPHONE ENCOUNTER
Reason for call:  Other   Patient called regarding (reason for call): call back  Additional comments: Tamiko from [facility] is calling wanting verbal orders for a 4 day hold on Warfarin, please call back      Phone number to reach patient:  Other phone number:  [phone]    Best Time:  any    Can we leave a detailed message on this number?  YES    Travel screening: Not Applicable

## 2020-08-24 NOTE — TELEPHONE ENCOUNTER
Results received for INR 2.3 today per home monitor. Also have orders for hold and bridging for upcoming procedure on 9/9/20. Left message for patient to call me back. Will need to send rx to pharmcy for lovenox. Lyudmila Good RN

## 2020-08-25 ENCOUNTER — ANTICOAGULATION THERAPY VISIT (OUTPATIENT)
Dept: NURSING | Facility: CLINIC | Age: 50
End: 2020-08-25
Payer: COMMERCIAL

## 2020-08-25 DIAGNOSIS — Z79.01 LONG TERM CURRENT USE OF ANTICOAGULANT THERAPY: ICD-10-CM

## 2020-08-25 DIAGNOSIS — I82.402 DEEP VEIN THROMBOSIS (DVT) OF LEFT LOWER EXTREMITY, UNSPECIFIED CHRONICITY, UNSPECIFIED VEIN (H): ICD-10-CM

## 2020-08-25 PROCEDURE — 99207 ZZC NO CHARGE NURSE ONLY: CPT

## 2020-08-25 NOTE — PROGRESS NOTES
ANTICOAGULATION FOLLOW-UP CLINIC VISIT    Patient Name:  Donato De Leon  Date:  2020  Contact Type:  Telephone    SUBJECTIVE:  Patient Findings     Comments:   Therapeutic after holding one dose. States nothing has really changed and not sure why was supra. Recheck in 1 wk. Also informed of orders for hold and bridging prior to colonoscopy on  and will send him these instructions in mail. Rx for lovenox sent to pharmacy.         Clinical Outcomes     Comments:   Therapeutic after holding one dose. States nothing has really changed and not sure why was supra. Recheck in 1 wk. Also informed of orders for hold and bridging prior to colonoscopy on  and will send him these instructions in mail. Rx for lovenox sent to pharmacy.            OBJECTIVE    Recent labs: (last 7 days)     20   INR 2.3*       ASSESSMENT / PLAN  INR assessment THER    Recheck INR In: 1 WEEK    INR Location Home INR    Billed home INR No      Anticoagulation Summary  As of 2020    INR goal:   2.0-3.0   TTR:   52.2 % (1 y)   INR used for dosin.3 (2020)   Warfarin maintenance plan:   15 mg (5 mg x 3) every Wed; 10 mg (5 mg x 2) all other days   Full warfarin instructions:   : Hold; : Hold; : Hold; : Hold; : Hold; : 25 mg; Otherwise 15 mg every Wed; 10 mg all other days   Weekly warfarin total:   75 mg   Plan last modified:   Lyudmila Good RN (2019)   Next INR check:   2020   Priority:   High   Target end date:   Indefinite    Indications    Deep vein thrombosis (DVT) (H) [I82.409] [I82.409]  Long-term (current) use of anticoagulants [Z79.01] [Z79.01]  Deep vein thrombosis (DVT) of left lower extremity  unspecified chronicity  unspecified vein (H) [I82.402]             Anticoagulation Episode Summary     INR check location:       Preferred lab:       Send INR reminders to:   GISEL PERSAUD    Comments:         Anticoagulation Care Providers     Provider Role Specialty Phone number     Carter Skinner PA-C Referring Physician Assistant [phone]            See the Encounter Report to view Anticoagulation Flowsheet and Dosing Calendar (Go to Encounters tab in chart review, and find the Anticoagulation Therapy Visit)    Dosage adjustment made based on physician directed care plan.    Lyudmila Good RN

## 2020-08-25 NOTE — TELEPHONE ENCOUNTER
Spoke with patient on phone. He was given instructions for INR results.  Also informed of plan for hold and bridging prior to colonoscopy on 9/9. Will send these instructions to him in mail as he has not accessed him [email or web address]. He can call me if any questions. Lyudmila Good RN

## 2020-08-25 NOTE — PATIENT INSTRUCTIONS
Note      Last warfarin dose: 09/03/2020 09/04/2020, NO warfarin  09/05/2020, NO warfarin  Est CrCl 110ml/min, OK for full dose Lovenox 120mg BID  09/06/2020, NO warfarin, begin enoxaparin injections into the abdomen every 12 hours (AM and PM)  09/07/2020, NO warfarin, enoxaparin injection into the abdomen every 12 hours (AM and PM)  09/08/2020, NO warfarin, enoxaparin injection into the abdomen AM only (no enoxaparin 24 hours prior to surgery)  09/09/2020, DAY OF PROCEDURE, NO enoxaparin. Restart warfarin 25mg (5 tabs) in the evening, unless instructed otherwise by the physician.  09/10/2020, Restart enoxaparin injections into the abdomen every 12 hours (AM and PM), unless instructed otherwise by the physician (if polyps removed 40mg subcutaneous Lovenox once daily), and 10mg (2 tabs) warfarin in the evening.   09/11/2020, Enoxaparin injection into the abdomen every 12 hours (AM and PM)(if polyps removed 40mg subcutaneous Lovenox once daily) and 10 mg (2 tabs) warfarin in the evening.  09/12/2020, Enoxaparin injection into the abdomen every 12 hours (AM and PM) and 10mg (2 tabs) warfarin in the evening.  09/13/2020, Enoxaparin injection into the abdomen every 12 hours (AM and PM) and 10mg (2 tabs) warfarin in the evening.   09/14/2020, Enoxaparin injection into the abdomen in the AM. Recheck INR at home or at the [facility] for further dosing instructions.   If you have any questions please call the Anticoagulation Clinic at [phone].           This is a copy of the instructions from the pharmacist for the warfarin hold and bridging with enoxaparin prior to and after the colonoscopy.   I will also include instructions for giving yourself the injections for review. If you have any questions please call.    Lyudmila REYEZ  [facility]   [phone]

## 2020-08-25 NOTE — TELEPHONE ENCOUNTER
Left message for patient to call me back to discuss instructions below and INR results from 8/24 of 2.3. Lyudmila Good RN

## 2020-08-30 LAB — INR PPP: 2.8 (ref 0.9–1.1)

## 2020-09-01 ENCOUNTER — ANTICOAGULATION THERAPY VISIT (OUTPATIENT)
Dept: NURSING | Facility: CLINIC | Age: 50
End: 2020-09-01
Payer: COMMERCIAL

## 2020-09-01 DIAGNOSIS — I82.402 DEEP VEIN THROMBOSIS (DVT) OF LEFT LOWER EXTREMITY, UNSPECIFIED CHRONICITY, UNSPECIFIED VEIN (H): ICD-10-CM

## 2020-09-01 DIAGNOSIS — Z79.01 LONG TERM CURRENT USE OF ANTICOAGULANT THERAPY: ICD-10-CM

## 2020-09-01 PROCEDURE — G0250 MD INR TEST REVIE INTER MGMT: HCPCS

## 2020-09-01 NOTE — PROGRESS NOTES
ANTICOAGULATION FOLLOW-UP CLINIC VISIT    Patient Name:  Donato De Leon  Date:  2020  Contact Type:  Telephone    SUBJECTIVE:  Patient Findings     Comments:   Therapeutic. Continue with same dose and plan for hold and bridging prior to procedure. Left message for patient to call me if any questions or concerns.         Clinical Outcomes     Comments:   Therapeutic. Continue with same dose and plan for hold and bridging prior to procedure. Left message for patient to call me if any questions or concerns.            OBJECTIVE    Recent labs: (last 7 days)     20   INR 2.8*       ASSESSMENT / PLAN  INR assessment THER    Recheck INR In: 2 WEEKS    INR Location Home INR    Billed home INR Yes      Anticoagulation Summary  As of 2020    INR goal:   2.0-3.0   TTR:   54.0 % (1 y)   INR used for dosin.8 (2020)   Warfarin maintenance plan:   15 mg (5 mg x 3) every Wed; 10 mg (5 mg x 2) all other days   Full warfarin instructions:   : Hold; : Hold; : Hold; : Hold; : Hold; : 25 mg; Otherwise 15 mg every Wed; 10 mg all other days   Weekly warfarin total:   75 mg   No change documented:   Lyudmila Good RN   Plan last modified:   Lyudmila Good RN (2019)   Next INR check:   2020   Priority:   High   Target end date:   Indefinite    Indications    Deep vein thrombosis (DVT) (H) [I82.409] [I82.409]  Long-term (current) use of anticoagulants [Z79.01] [Z79.01]  Deep vein thrombosis (DVT) of left lower extremity  unspecified chronicity  unspecified vein (H) [I82.402]             Anticoagulation Episode Summary     INR check location:       Preferred lab:       Send INR reminders to:   GISEL PERSAUD    Comments:         Anticoagulation Care Providers     Provider Role Specialty Phone number    Carter Skinner PA-C Referring Physician Assistant [phone]            See the Encounter Report to view Anticoagulation Flowsheet and Dosing Calendar (Go to Encounters  tab in chart review, and find the Anticoagulation Therapy Visit)    Dosage adjustment made based on physician directed care plan.    Lyudmila Good RN

## 2020-09-02 ENCOUNTER — TRANSFERRED RECORDS (OUTPATIENT)
Dept: HEALTH INFORMATION MANAGEMENT | Facility: CLINIC | Age: 50
End: 2020-09-02

## 2020-09-09 ENCOUNTER — TRANSFERRED RECORDS (OUTPATIENT)
Dept: HEALTH INFORMATION MANAGEMENT | Facility: CLINIC | Age: 50
End: 2020-09-09

## 2020-09-16 LAB — INR PPP: 1.8 (ref 0.9–1.1)

## 2020-09-17 ENCOUNTER — TELEPHONE (OUTPATIENT)
Dept: FAMILY MEDICINE | Facility: CLINIC | Age: 50
End: 2020-09-17

## 2020-09-17 NOTE — TELEPHONE ENCOUNTER
INR 1.8 reported by home monitor yesterday. Left message for patient to call me back Lyudmila Good RN

## 2020-09-21 ENCOUNTER — ANTICOAGULATION THERAPY VISIT (OUTPATIENT)
Dept: NURSING | Facility: CLINIC | Age: 50
End: 2020-09-21
Payer: COMMERCIAL

## 2020-09-21 DIAGNOSIS — I82.402 DEEP VEIN THROMBOSIS (DVT) OF LEFT LOWER EXTREMITY, UNSPECIFIED CHRONICITY, UNSPECIFIED VEIN (H): ICD-10-CM

## 2020-09-21 DIAGNOSIS — Z79.01 LONG TERM CURRENT USE OF ANTICOAGULANT THERAPY: ICD-10-CM

## 2020-09-21 LAB — INR PPP: 2.8 (ref 0.9–1.1)

## 2020-09-21 PROCEDURE — 99207 ZZC NO CHARGE NURSE ONLY: CPT

## 2020-09-21 NOTE — TELEPHONE ENCOUNTER
Patient called me back. Reports he check INR at home today and was 2.8.  He has been back on weekly warfarin dose and has no concerns or changes. States colonoscopy went well with no problems and will not be needed again for another year. See anticoagulation encounter. Lyudmila Good RN

## 2020-09-21 NOTE — PROGRESS NOTES
ANTICOAGULATION FOLLOW-UP CLINIC VISIT    Patient Name:  Donato De Leon  Date:  2020  Contact Type:  Telephone    SUBJECTIVE:  Patient Findings     Comments:   Patient called me back today after messages left for him on his voicemail on .. Reports he check INR at home today and was 2.8.  He has been back on weekly warfarin dose and has no concerns or changes. States colonoscopy went well with no problems and will not be needed again for another year        Clinical Outcomes     Negatives:   Major bleeding event, Thromboembolic event, Anticoagulation-related hospital admission, Anticoagulation-related ED visit, Anticoagulation-related fatality    Comments:   Patient called me back today after messages left for him on his voicemail on .. Reports he check INR at home today and was 2.8.  He has been back on weekly warfarin dose and has no concerns or changes. States colonoscopy went well with no problems and will not be needed again for another year           OBJECTIVE    Recent labs: (last 7 days)     20   INR 2.8*       ASSESSMENT / PLAN  INR assessment THER    Recheck INR In: 2 WEEKS    INR Location Home INR      Anticoagulation Summary  As of 2020    INR goal:   2.0-3.0   TTR:   58.5 % (1 y)   INR used for dosin.8 (2020)   Warfarin maintenance plan:   15 mg (5 mg x 3) every Wed; 10 mg (5 mg x 2) all other days   Full warfarin instructions:   15 mg every Wed; 10 mg all other days   Weekly warfarin total:   75 mg   No change documented:   Lyudmila Good RN   Plan last modified:   Lyudmila Good RN (2019)   Next INR check:   10/5/2020   Priority:   High   Target end date:   Indefinite    Indications    Deep vein thrombosis (DVT) (H) [I82.409] [I82.409]  Long-term (current) use of anticoagulants [Z79.01] [Z79.01]  Deep vein thrombosis (DVT) of left lower extremity  unspecified chronicity  unspecified vein (H) [I82.402]             Anticoagulation Episode Summary     INR  check location:       Preferred lab:       Send INR reminders to:   GISEL PERSAUD    Comments:         Anticoagulation Care Providers     Provider Role Specialty Phone number    Carter Skinner PA-C Referring Physician Assistant [phone]            See the Encounter Report to view Anticoagulation Flowsheet and Dosing Calendar (Go to Encounters tab in chart review, and find the Anticoagulation Therapy Visit)    Dosage adjustment made based on physician directed care plan.    Lyudmila Good RN

## 2020-09-21 NOTE — TELEPHONE ENCOUNTER
Left message for patient to call me back. No call backs and no further INR results reported to [facility]. Lyudmila Good RN

## 2020-10-05 ENCOUNTER — TRANSFERRED RECORDS (OUTPATIENT)
Dept: HEALTH INFORMATION MANAGEMENT | Facility: CLINIC | Age: 50
End: 2020-10-05

## 2020-10-05 LAB — INR PPP: 3.9 (ref 0.9–1.1)

## 2020-10-06 ENCOUNTER — ANTICOAGULATION THERAPY VISIT (OUTPATIENT)
Dept: NURSING | Facility: CLINIC | Age: 50
End: 2020-10-06
Payer: COMMERCIAL

## 2020-10-06 DIAGNOSIS — Z79.01 LONG TERM CURRENT USE OF ANTICOAGULANT THERAPY: ICD-10-CM

## 2020-10-06 DIAGNOSIS — I82.402 DEEP VEIN THROMBOSIS (DVT) OF LEFT LOWER EXTREMITY, UNSPECIFIED CHRONICITY, UNSPECIFIED VEIN (H): ICD-10-CM

## 2020-10-06 PROCEDURE — 99207 PR NO CHARGE NURSE ONLY: CPT

## 2020-10-06 NOTE — PROGRESS NOTES
ANTICOAGULATION FOLLOW-UP CLINIC VISIT    Patient Name:  Donato De Leon  Date:  10/6/2020  Contact Type:  Telephone    SUBJECTIVE:  Patient Findings     Comments:  The patient was assessed for diet, medication, and activity level changes, missed or extra doses, bruising or bleeding, with no problem findings.  Patient takes dose in the morning took dose yesterday and today already. Patient will hold dose tomorrow and recheck in 2 weeks    Chanelle LYNNE RN, CPN          Clinical Outcomes     Negatives:  Major bleeding event, Thromboembolic event, Anticoagulation-related hospital admission, Anticoagulation-related ED visit, Anticoagulation-related fatality    Comments:  The patient was assessed for diet, medication, and activity level changes, missed or extra doses, bruising or bleeding, with no problem findings.  Patient takes dose in the morning took dose yesterday and today already. Patient will hold dose tomorrow and recheck in 2 weeks    Chanelle LYNNE RN, CPN             OBJECTIVE    Recent labs: (last 7 days)     10/05/20   INR 3.9*       ASSESSMENT / PLAN  INR assessment SUPRA    Recheck INR In: 2 WEEKS    INR Location Home INR      Anticoagulation Summary  As of 10/6/2020    INR goal:  2.0-3.0   TTR:  58.1 % (1 y)   INR used for dosing:  3.9 (10/5/2020)   Warfarin maintenance plan:  15 mg (5 mg x 3) every Wed; 10 mg (5 mg x 2) all other days   Full warfarin instructions:  10/7: Hold; Otherwise 15 mg every Wed; 10 mg all other days   Weekly warfarin total:  75 mg   Plan last modified:  Lyudmila Good RN (8/22/2019)   Next INR check:  10/19/2020   Priority:  High   Target end date:  Indefinite    Indications    Deep vein thrombosis (DVT) (H) [I82.409] [I82.409]  Long-term (current) use of anticoagulants [Z79.01] [Z79.01]  Deep vein thrombosis (DVT) of left lower extremity  unspecified chronicity  unspecified vein (H) [I82.402]             Anticoagulation Episode Summary     INR check location:       Preferred lab:      Send INR reminders to:  GISEL PERSAUD    Comments:        Anticoagulation Care Providers     Provider Role Specialty Phone number    Oppel, Carter Romo PA-C Referring Physician Assistant [phone]            See the Encounter Report to view Anticoagulation Flowsheet and Dosing Calendar (Go to Encounters tab in chart review, and find the Anticoagulation Therapy Visit)    Dosage adjustment made based on physician directed care plan.    Chanelle Duong RN

## 2020-10-21 ENCOUNTER — TRANSFERRED RECORDS (OUTPATIENT)
Dept: HEALTH INFORMATION MANAGEMENT | Facility: CLINIC | Age: 50
End: 2020-10-21

## 2020-10-21 ENCOUNTER — ANTICOAGULATION THERAPY VISIT (OUTPATIENT)
Dept: FAMILY MEDICINE | Facility: CLINIC | Age: 50
End: 2020-10-21

## 2020-10-21 DIAGNOSIS — I82.402 DEEP VEIN THROMBOSIS (DVT) OF LEFT LOWER EXTREMITY, UNSPECIFIED CHRONICITY, UNSPECIFIED VEIN (H): ICD-10-CM

## 2020-10-21 DIAGNOSIS — Z79.01 LONG TERM CURRENT USE OF ANTICOAGULANT THERAPY: ICD-10-CM

## 2020-10-21 LAB — INR PPP: 3.7 (ref 0.9–1.1)

## 2020-10-21 NOTE — PROGRESS NOTES
Anticoagulation Management    Unable to reach Jerry today.    Today's INR result of 3.7 is supratherapeutic (goal INR of 2.0-3.0).  Result received from: Home Monitor    Follow up required to discuss out of range INR     Left message to hold warfarin tonight.      Anticoagulation clinic to follow up    April Campa RN, BSN, PHN

## 2020-10-22 NOTE — PROGRESS NOTES
ANTICOAGULATION MANAGEMENT     Patient Name:  Donato De Leon  Date:  10/22/2020    ASSESSMENT /SUBJECTIVE:    Today's INR result of 3.7 is supratherapeutic. Goal INR of 2.0-3.0      Warfarin dose taken: Warfarin taken as instructed    Diet: No new diet changes affecting INR    Medication changes/ interactions: No new medications/supplements affecting INR    Previous INR: Supratherapeutic     S/S of bleeding or thromboembolism: No    New injury or illness: No    Upcoming surgery, procedure or cardioversion: No    Additional findings: None      PLAN:    Telephone call with Donato regarding INR result and instructed:     Warfarin Dosing Instructions: hold friday (patient took 15 mg wed/10 mg today already) then continue your current warfarin dose of 10 mg daily    Instructed patient to follow up no later than: 1 week  Patient to recheck with home meter    Education provided: Monitoring for bleeding signs and symptoms      Jerry verbalizes understanding and agrees to warfarin dosing plan.    Instructed to call the Anticoagulation Clinic for any changes, questions or concerns. (#[phone])        Kia Domingo RN      OBJECTIVE:  Recent labs: (last 7 days)     10/21/20   INR 3.7*         No question data found.  Anticoagulation Summary  As of 10/21/2020    INR goal:  2.0-3.0   TTR:  53.8 % (1 y)   INR used for dosing:  3.7 (10/21/2020)   Warfarin maintenance plan:  10 mg (5 mg x 2) every day   Full warfarin instructions:  10/21: Hold; Otherwise 10 mg every day   Weekly warfarin total:  70 mg   Plan last modified:  April Alvarado RN (10/21/2020)   Next INR check:     Priority:  High   Target end date:  Indefinite    Indications    Deep vein thrombosis (DVT) (H) [I82.409] [I82.409]  Long-term (current) use of anticoagulants [Z79.01] [Z79.01]  Deep vein thrombosis (DVT) of left lower extremity  unspecified chronicity  unspecified vein (H) [I82.402]             Anticoagulation Episode Summary     INR check location:       Preferred lab:  EXTERNAL LAB    Send INR reminders to:  GISEL CANCHOLA    Comments:  [facility] INR Monitoring patient      Anticoagulation Care Providers     Provider Role Specialty Phone number    Oppel, Carter Romo PA-C Referring Physician Assistant [phone]

## 2020-10-22 NOTE — PROGRESS NOTES
Anticoagulation Management    Unable to reach reese today.    Today's INR result of 3.7 is supratherapeutic (goal INR of 2.0-3.0).  Result received from: Home Monitor    Follow up required to confirm warfarin dose taken and assess for changes    Left message to continue current dose of warfarin 10 mg tonight.      Anticoagulation clinic to follow up    Kia Domingo RN

## 2020-11-02 ENCOUNTER — TRANSFERRED RECORDS (OUTPATIENT)
Dept: HEALTH INFORMATION MANAGEMENT | Facility: CLINIC | Age: 50
End: 2020-11-02

## 2020-11-02 ENCOUNTER — ANTICOAGULATION THERAPY VISIT (OUTPATIENT)
Dept: FAMILY MEDICINE | Facility: CLINIC | Age: 50
End: 2020-11-02

## 2020-11-02 DIAGNOSIS — I82.401 ACUTE DEEP VEIN THROMBOSIS (DVT) OF RIGHT LOWER EXTREMITY, UNSPECIFIED VEIN (H): ICD-10-CM

## 2020-11-02 DIAGNOSIS — I82.402 DEEP VEIN THROMBOSIS (DVT) OF LEFT LOWER EXTREMITY, UNSPECIFIED CHRONICITY, UNSPECIFIED VEIN (H): ICD-10-CM

## 2020-11-02 DIAGNOSIS — Z79.01 LONG TERM CURRENT USE OF ANTICOAGULANT THERAPY: ICD-10-CM

## 2020-11-02 LAB — INR PPP: 3.6 (ref 0.9–1.1)

## 2020-11-02 NOTE — PROGRESS NOTES
Anticoagulation Management    Unable to reach Jerry today.    Today's INR result of 3.6 is supratherapeutic (goal INR of 2.0-3.0).  Result received from: Home Monitor    Follow up required to confirm warfarin dose taken and assess for changes    Left message to take reduced dose of warfarin, 5 mg tonight.      Anticoagulation clinic to follow up    Eliza Donovan RN

## 2020-11-02 NOTE — PROGRESS NOTES
ANTICOAGULATION MANAGEMENT     Patient Name:  Donato De Leon  Date:  11/2/2020    ASSESSMENT /SUBJECTIVE:    Today's INR result of 3.6 is supratherapeutic. Goal INR of 2.0-3.0      Warfarin dose taken: More warfarin taken than planned which may be affecting INR    Diet: No new diet changes affecting INR    Medication changes/ interactions: No new medications/supplements affecting INR    Previous INR: Supratherapeutic     S/S of bleeding or thromboembolism: No    New injury or illness: No    Upcoming surgery, procedure or cardioversion: No    Additional findings: None      PLAN:    Telephone call with Donato regarding INR result and instructed:     Warfarin Dosing Instructions: Change your warfarin dose to 5 mg on M; 10 mg ROW    Instructed patient to follow up no later than: 2 weeks  Patient to recheck with home meter    Education provided: None required    Jerry verbalizes understanding and agrees to warfarin dosing plan.    Instructed to call the Anticoagulation Clinic for any changes, questions or concerns. (#[phone])        Chyna Allen RN      OBJECTIVE:  Recent labs: (last 7 days)     11/02/20   INR 3.6*         No question data found.  Anticoagulation Summary  As of 11/2/2020    INR goal:  2.0-3.0   TTR:  50.6 % (1 y)   INR used for dosing:  3.6 (11/2/2020)   Warfarin maintenance plan:  5 mg (5 mg x 1) every Mon; 10 mg (5 mg x 2) all other days   Full warfarin instructions:  5 mg every Mon; 10 mg all other days   Weekly warfarin total:  65 mg   Plan last modified:  Eliza Donovan RN (11/2/2020)   Next INR check:  11/16/2020   Priority:  High   Target end date:  Indefinite    Indications    Deep vein thrombosis (DVT) (H) [I82.409] [I82.409]  Long-term (current) use of anticoagulants [Z79.01] [Z79.01]  Deep vein thrombosis (DVT) of left lower extremity  unspecified chronicity  unspecified vein (H) [I82.402]             Anticoagulation Episode Summary     INR check location:      Preferred lab:   EXTERNAL LAB    Send INR reminders to:  GISEL CANCHOLA    Comments:  [facility] INR Monitoring patient      Anticoagulation Care Providers     Provider Role Specialty Phone number    Brody, Carter Romo PA-C Referring Family Medicine [phone]

## 2020-11-18 ENCOUNTER — ANTICOAGULATION THERAPY VISIT (OUTPATIENT)
Dept: FAMILY MEDICINE | Facility: CLINIC | Age: 50
End: 2020-11-18

## 2020-11-18 ENCOUNTER — TRANSFERRED RECORDS (OUTPATIENT)
Dept: HEALTH INFORMATION MANAGEMENT | Facility: CLINIC | Age: 50
End: 2020-11-18

## 2020-11-18 DIAGNOSIS — Z79.01 LONG TERM CURRENT USE OF ANTICOAGULANT THERAPY: ICD-10-CM

## 2020-11-18 DIAGNOSIS — I82.402 DEEP VEIN THROMBOSIS (DVT) OF LEFT LOWER EXTREMITY, UNSPECIFIED CHRONICITY, UNSPECIFIED VEIN (H): ICD-10-CM

## 2020-11-18 DIAGNOSIS — I82.401 ACUTE DEEP VEIN THROMBOSIS (DVT) OF RIGHT LOWER EXTREMITY, UNSPECIFIED VEIN (H): ICD-10-CM

## 2020-11-18 LAB — INR PPP: 2.5 (ref 0.9–1.1)

## 2020-11-18 NOTE — PROGRESS NOTES
ANTICOAGULATION MANAGEMENT     Patient Name:  Donato De Leon  Date:  2020    ASSESSMENT /SUBJECTIVE:    Today's INR result of 2.5 is therapeutic. Goal INR of 2.0-3.0      Warfarin dose taken: Warfarin taken as instructed    Diet: No new diet changes affecting INR    Medication changes/ interactions: No new medications/supplements affecting INR    Previous INR: Supratherapeutic     S/S of bleeding or thromboembolism: No    New injury or illness: No    Upcoming surgery, procedure or cardioversion: No    Additional findings: None      PLAN:  Detailed message left with Donato regarding INR result and instructed:     Warfarin Dosing Instructions: Continue your current warfarin dose 5 mg Mon; 10 mg all other days    Instructed patient to follow up no later than: 2 weeks  Patient to recheck with home meter    Education provided: Monitoring for bleeding signs and symptoms and Monitoring for clotting signs and symptoms      Jerry,  verbalizes understanding and agrees to warfarin dosing plan.    Instructed to call the Anticoagulation Clinic for any changes, questions or concerns. (#[phone])        Eliza Donovan RN      OBJECTIVE:  Recent labs: (last 7 days)     20   INR 2.5*         No question data found.  Anticoagulation Summary  As of 2020    INR goal:  2.0-3.0   TTR:  48.6 % (1 y)   INR used for dosin.5 (2020)   Warfarin maintenance plan:  5 mg (5 mg x 1) every Mon; 10 mg (5 mg x 2) all other days   Full warfarin instructions:  5 mg every Mon; 10 mg all other days   Weekly warfarin total:  65 mg   Plan last modified:  Eliza Donovan RN (2020)   Next INR check:  2020   Priority:  High   Target end date:  Indefinite    Indications    Deep vein thrombosis (DVT) (H) [I82.409] [I82.409]  Long-term (current) use of anticoagulants [Z79.01] [Z79.01]  Deep vein thrombosis (DVT) of left lower extremity  unspecified chronicity  unspecified vein (H) [I82.402]              Anticoagulation Episode Summary     INR check location:      Preferred lab:  EXTERNAL LAB    Send INR reminders to:  GISEL CANCHOLA    Comments:  [facility] INR Monitoring patient      Anticoagulation Care Providers     Provider Role Specialty Phone number    Opcammy, Carter Romo PA-C Referring [facility] [phone]

## 2020-12-10 ENCOUNTER — TRANSFERRED RECORDS (OUTPATIENT)
Dept: HEALTH INFORMATION MANAGEMENT | Facility: CLINIC | Age: 50
End: 2020-12-10

## 2020-12-12 LAB — INR PPP: 2.1 (ref 0.9–1.1)

## 2020-12-14 ENCOUNTER — ANTICOAGULATION THERAPY VISIT (OUTPATIENT)
Dept: FAMILY MEDICINE | Facility: CLINIC | Age: 50
End: 2020-12-14

## 2020-12-14 DIAGNOSIS — Z79.01 LONG TERM CURRENT USE OF ANTICOAGULANT THERAPY: ICD-10-CM

## 2020-12-14 DIAGNOSIS — I82.402 DEEP VEIN THROMBOSIS (DVT) OF LEFT LOWER EXTREMITY, UNSPECIFIED CHRONICITY, UNSPECIFIED VEIN (H): ICD-10-CM

## 2020-12-14 DIAGNOSIS — I82.401 ACUTE DEEP VEIN THROMBOSIS (DVT) OF RIGHT LOWER EXTREMITY, UNSPECIFIED VEIN (H): ICD-10-CM

## 2020-12-14 NOTE — PROGRESS NOTES
Anticoagulation Management    Unable to reach Jerry today.    Today's INR result of 2.1 is therapeutic (goal INR of 2.0-3.0).  Result received from: Home Monitor    Follow up required to confirm warfarin dose taken and assess for changes    LMTCB to discuss result.      Anticoagulation clinic to follow up    Kiran Escalona RN

## 2020-12-14 NOTE — PROGRESS NOTES
ANTICOAGULATION MANAGEMENT     Patient Name:  Donato De Leon  Date:  12/15/2020    **Start monitoring by exception after this visit.      ASSESSMENT /SUBJECTIVE:    Today's INR result of 2.1 is therapeutic. Goal INR of 2.0-3.0      Warfarin dose taken: Warfarin taken as instructed    Diet: No new diet changes affecting INR    Medication changes/ interactions: No new medications/supplements affecting INR    Previous INR: Therapeutic     S/S of bleeding or thromboembolism: No    New injury or illness: No    Upcoming surgery, procedure or cardioversion: No    Additional findings: None      PLAN:    Telephone call with Donato regarding INR result and instructed:     Warfarin Dosing Instructions: Continue your current warfarin dose 5 mg every Mon; 10 mg all other days    Instructed patient to follow up no later than: 2 weeks  Patient to recheck with home meter    Education provided: Please call back if any changes to your diet, medications or how you've been taking warfarin, Target INR goal and significance of current INR result and . Patient would like to be monitored by exception. Explained that [facility] will call every 12 weeks regardless and that he needs to notify us with any changes to medications, diet, or health and if he has any proceudres coming up.      Jerry verbalizes understanding and agrees to warfarin dosing plan.    Instructed to call the Anticoagulation Clinic for any changes, questions or concerns. (#[phone])        Kiran Escalona RN      OBJECTIVE:  Recent labs: (last 7 days)     20   INR 2.1*         No question data found.  Anticoagulation Summary  As of 2020    INR goal:  2.0-3.0   TTR:  55.6 % (1 y)   INR used for dosin.1 (2020)   Warfarin maintenance plan:  5 mg (5 mg x 1) every Mon; 10 mg (5 mg x 2) all other days   Full warfarin instructions:  5 mg every Mon; 10 mg all other days   Weekly warfarin total:  65 mg   No change documented:  Kiran Escalona RN   Plan last  modified:  Eliza Donovan RN (11/2/2020)   Next INR check:  12/23/2020   Priority:  Maintenance   Target end date:  Indefinite    Indications    Deep vein thrombosis (DVT) (H) [I82.409] [I82.409]  Long-term (current) use of anticoagulants [Z79.01] [Z79.01]  Deep vein thrombosis (DVT) of left lower extremity  unspecified chronicity  unspecified vein (H) [I82.402]             Anticoagulation Episode Summary     INR check location:      Preferred lab:  EXTERNAL LAB    Send INR reminders to:  GISEL CANCHOLA    Comments:  [facility] INR Monitoring patient      Anticoagulation Care Providers     Provider Role Specialty Phone number    Carter Skinner PA-C Referring Family Medicine [phone]       2nd Attempt  Anticoagulation Management    Unable to reach Jerry today.    Today's INR result of 2.1 is therapeutic (goal INR of 2.0-3.0).  Result received from: Home Monitor    Follow up required to confirm warfarin dose taken and assess for changes    Left message to continue current dose of warfarin 5 mg tonight.      Anticoagulation clinic to follow up    Kiran Escalona RN

## 2020-12-17 DIAGNOSIS — I82.402 DEEP VEIN THROMBOSIS (DVT) OF LEFT LOWER EXTREMITY, UNSPECIFIED CHRONICITY, UNSPECIFIED VEIN (H): ICD-10-CM

## 2020-12-17 RX ORDER — WARFARIN SODIUM 5 MG/1
TABLET ORAL
Qty: 156 TABLET | Refills: 0 | Status: SHIPPED | OUTPATIENT
Start: 2020-12-17 | End: 2021-03-05

## 2020-12-17 NOTE — TELEPHONE ENCOUNTER
Prescription approved per [facility] Refill Protocol. Lyudmila Good RN

## 2020-12-31 ENCOUNTER — DOCUMENTATION ONLY (OUTPATIENT)
Dept: FAMILY MEDICINE | Facility: CLINIC | Age: 50
End: 2020-12-31

## 2020-12-31 NOTE — PROGRESS NOTES
ANTICOAGULATION     Donato De Leon is overdue for INR check.      mychart sent    Kia Domingo RN

## 2021-01-07 ENCOUNTER — TRANSFERRED RECORDS (OUTPATIENT)
Dept: HEALTH INFORMATION MANAGEMENT | Facility: CLINIC | Age: 51
End: 2021-01-07

## 2021-01-07 LAB — INR PPP: 2.1 (ref 0.9–1.1)

## 2021-01-08 ENCOUNTER — ANTICOAGULATION THERAPY VISIT (OUTPATIENT)
Dept: FAMILY MEDICINE | Facility: CLINIC | Age: 51
End: 2021-01-08

## 2021-01-08 DIAGNOSIS — I82.401 ACUTE DEEP VEIN THROMBOSIS (DVT) OF RIGHT LOWER EXTREMITY, UNSPECIFIED VEIN (H): ICD-10-CM

## 2021-01-08 DIAGNOSIS — I82.402 DEEP VEIN THROMBOSIS (DVT) OF LEFT LOWER EXTREMITY, UNSPECIFIED CHRONICITY, UNSPECIFIED VEIN (H): ICD-10-CM

## 2021-01-08 DIAGNOSIS — Z79.01 LONG TERM CURRENT USE OF ANTICOAGULANT THERAPY: ICD-10-CM

## 2021-01-08 NOTE — PROGRESS NOTES
ANTICOAGULATION  MANAGEMENT-Patient Home Monitoring Result    Assessment     Therapeutic INR result of 2.1 . Goal range 2.0-3.0. Received via fax from [employer] home INR monitoring company.        Previous INR was therapeutic    Donato was last contacted by phone: 2020    Plan     Per home monitoring agreement with patient, patient was NOT contacted regarding therapeutic result today.  Patient is to continue current dose and continue to check INR with home monitor per protocol.  ?       OBJECTIVE    INR   Date Value Ref Range Status   2021 2.1 (A) 0.90 - 1.10 Final       ASSESSMENT / PLAN  No question data found.  Anticoagulation Summary  As of 2021    INR goal:  2.0-3.0   TTR:  59.9 % (1 y)   INR used for dosin.1 (2021)   Warfarin maintenance plan:  5 mg (5 mg x 1) every Mon; 10 mg (5 mg x 2) all other days   Full warfarin instructions:  5 mg every Mon; 10 mg all other days   Weekly warfarin total:  65 mg   Plan last modified:  Eliza Donovan RN (2020)   Next INR check:  2021   Priority:  Maintenance   Target end date:  Indefinite    Indications    Deep vein thrombosis (DVT) (H) [I82.409] [I82.409]  Long-term (current) use of anticoagulants [Z79.01] [Z79.01]  Deep vein thrombosis (DVT) of left lower extremity  unspecified chronicity  unspecified vein (H) [I82.402]             Anticoagulation Episode Summary     INR check location:      Preferred lab:  EXTERNAL LAB    Send INR reminders to:  GISEL CANCHOLA    Comments:  [facility] INR Monitoring patient      Anticoagulation Care Providers     Provider Role Specialty Phone number    Carter Skinner PA-C Referring Family Medicine [phone]

## 2021-01-08 NOTE — PROGRESS NOTES
Incoming fax from [employer] home monitoring company    Date of INR 1/8    INR result 2.1

## 2021-01-21 ENCOUNTER — TRANSFERRED RECORDS (OUTPATIENT)
Dept: HEALTH INFORMATION MANAGEMENT | Facility: CLINIC | Age: 51
End: 2021-01-21

## 2021-01-21 LAB
ALT SERPL-CCNC: 38 U/L (ref 0–78)
AST SERPL-CCNC: 21 U/L (ref 0–37)

## 2021-01-28 ENCOUNTER — ANTICOAGULATION THERAPY VISIT (OUTPATIENT)
Dept: NURSING | Facility: CLINIC | Age: 51
End: 2021-01-28

## 2021-01-28 ENCOUNTER — TRANSFERRED RECORDS (OUTPATIENT)
Dept: HEALTH INFORMATION MANAGEMENT | Facility: CLINIC | Age: 51
End: 2021-01-28

## 2021-01-28 DIAGNOSIS — I82.401 ACUTE DEEP VEIN THROMBOSIS (DVT) OF RIGHT LOWER EXTREMITY, UNSPECIFIED VEIN (H): ICD-10-CM

## 2021-01-28 DIAGNOSIS — I82.402 DEEP VEIN THROMBOSIS (DVT) OF LEFT LOWER EXTREMITY, UNSPECIFIED CHRONICITY, UNSPECIFIED VEIN (H): ICD-10-CM

## 2021-01-28 DIAGNOSIS — Z79.01 LONG TERM CURRENT USE OF ANTICOAGULANT THERAPY: ICD-10-CM

## 2021-01-28 LAB — INR PPP: 2.3 (ref 0.9–1.1)

## 2021-01-28 NOTE — PROGRESS NOTES
ANTICOAGULATION  MANAGEMENT-Patient Home Monitoring Result    Assessment     Therapeutic INR result of 2.3 . Goal range 2.0-3.0. Received via fax from [employer] home INR monitoring company.        Previous INR was therapeutic    Donato was last contacted by phone: 2020    Plan     Per home monitoring agreement with patient, patient was NOT contacted regarding therapeutic result today.  Patient is to continue current dose and continue to check INR with home monitor per protocol.  ?       OBJECTIVE    INR   Date Value Ref Range Status   2021 2.3 (A) 0.90 - 1.10 Final       ASSESSMENT / PLAN  No question data found.  Anticoagulation Summary  As of 2021    INR goal:  2.0-3.0   TTR:  62.5 % (1 y)   INR used for dosin.3 (2021)   Warfarin maintenance plan:  5 mg (5 mg x 1) every Mon; 10 mg (5 mg x 2) all other days   Full warfarin instructions:  5 mg every Mon; 10 mg all other days   Weekly warfarin total:  65 mg   No change documented:  Jacy Starr, RN   Plan last modified:  Eliza Donovan RN (2020)   Next INR check:  2021   Priority:  Maintenance   Target end date:  Indefinite    Indications    Deep vein thrombosis (DVT) (H) [I82.409] [I82.409]  Long-term (current) use of anticoagulants [Z79.01] [Z79.01]  Deep vein thrombosis (DVT) of left lower extremity  unspecified chronicity  unspecified vein (H) [I82.402]             Anticoagulation Episode Summary     INR check location:      Preferred lab:  EXTERNAL LAB    Send INR reminders to:  GISEL CANCHOLA    Comments:  [facility] INR Monitoring patient      Anticoagulation Care Providers     Provider Role Specialty Phone number    Carter Skinner PA-C Referring Family Medicine [phone]          Jacy Starr, RN - [facility]

## 2021-02-18 ENCOUNTER — DOCUMENTATION ONLY (OUTPATIENT)
Dept: FAMILY MEDICINE | Facility: CLINIC | Age: 51
End: 2021-02-18

## 2021-02-18 NOTE — PROGRESS NOTES
ANTICOAGULATION     Donato EUGENE De Leon is overdue for INR check.      Left message  reminding patient to check INR with their home meter and call results to the home monitoring company as soon as possible.     Kia Domingo RN

## 2021-02-19 ENCOUNTER — TRANSFERRED RECORDS (OUTPATIENT)
Dept: HEALTH INFORMATION MANAGEMENT | Facility: CLINIC | Age: 51
End: 2021-02-19

## 2021-02-19 ENCOUNTER — ANTICOAGULATION THERAPY VISIT (OUTPATIENT)
Dept: FAMILY MEDICINE | Facility: CLINIC | Age: 51
End: 2021-02-19

## 2021-02-19 DIAGNOSIS — Z79.01 LONG TERM CURRENT USE OF ANTICOAGULANT THERAPY: ICD-10-CM

## 2021-02-19 DIAGNOSIS — I82.401 ACUTE DEEP VEIN THROMBOSIS (DVT) OF RIGHT LOWER EXTREMITY, UNSPECIFIED VEIN (H): ICD-10-CM

## 2021-02-19 DIAGNOSIS — I82.402 DEEP VEIN THROMBOSIS (DVT) OF LEFT LOWER EXTREMITY, UNSPECIFIED CHRONICITY, UNSPECIFIED VEIN (H): ICD-10-CM

## 2021-02-19 LAB — INR PPP: 2.9 (ref 0.9–1.1)

## 2021-02-19 NOTE — PROGRESS NOTES
ANTICOAGULATION  MANAGEMENT-Patient Home Monitoring Result    Assessment     Therapeutic INR result of 2.9 . Goal range 2.0-3.0. Received via fax from [employer] home INR monitoring company.        Previous INR was therapeutic    Donato was last contacted by phone: 2020    Plan     Per home monitoring agreement with patient, patient was NOT contacted regarding therapeutic result today.  Patient is to continue current dose and continue to check INR with home monitor per protocol.  ?       OBJECTIVE    INR   Date Value Ref Range Status   2021 2.9 (A) 0.90 - 1.10 Final       ASSESSMENT / PLAN  No question data found.  Anticoagulation Summary  As of 2021    INR goal:  2.0-3.0   TTR:  64.6 % (1 y)   INR used for dosin.9 (2021)   Warfarin maintenance plan:  5 mg (5 mg x 1) every Mon; 10 mg (5 mg x 2) all other days   Full warfarin instructions:  5 mg every Mon; 10 mg all other days   Weekly warfarin total:  65 mg   Plan last modified:  Eliza Donovan RN (2020)   Next INR check:  3/5/2021   Priority:  Maintenance   Target end date:  Indefinite    Indications    Deep vein thrombosis (DVT) (H) [I82.409] [I82.409]  Long-term (current) use of anticoagulants [Z79.01] [Z79.01]  Deep vein thrombosis (DVT) of left lower extremity  unspecified chronicity  unspecified vein (H) [I82.402]             Anticoagulation Episode Summary     INR check location:      Preferred lab:  EXTERNAL LAB    Send INR reminders to:  GISEL CANCHOLA    Comments:  [facility] INR Monitoring patient      Anticoagulation Care Providers     Provider Role Specialty Phone number    Carter Skinner PA-C Referring Family Medicine [phone]

## 2021-03-05 DIAGNOSIS — I82.402 DEEP VEIN THROMBOSIS (DVT) OF LEFT LOWER EXTREMITY, UNSPECIFIED CHRONICITY, UNSPECIFIED VEIN (H): ICD-10-CM

## 2021-03-05 RX ORDER — WARFARIN SODIUM 5 MG/1
TABLET ORAL
Qty: 156 TABLET | Refills: 0 | Status: SHIPPED | OUTPATIENT
Start: 2021-03-05 | End: 2021-05-26

## 2021-03-05 NOTE — TELEPHONE ENCOUNTER
Anticoagulation Monitoring Instructions   Latest Ref Rng & Units    2/19/2021 5 mg every Mon; 10 mg all other days   Prescription approved per [facility] Refill Protocol.  Eliza Donovan RN  Anticoagulation Nurse - [facility]

## 2021-03-10 ENCOUNTER — TRANSFERRED RECORDS (OUTPATIENT)
Dept: HEALTH INFORMATION MANAGEMENT | Facility: CLINIC | Age: 51
End: 2021-03-10

## 2021-03-12 ENCOUNTER — DOCUMENTATION ONLY (OUTPATIENT)
Dept: FAMILY MEDICINE | Facility: CLINIC | Age: 51
End: 2021-03-12

## 2021-03-13 ENCOUNTER — TRANSFERRED RECORDS (OUTPATIENT)
Dept: HEALTH INFORMATION MANAGEMENT | Facility: CLINIC | Age: 51
End: 2021-03-13

## 2021-03-13 LAB — INR PPP: 2.7 (ref 0.9–1.1)

## 2021-03-15 ENCOUNTER — ANTICOAGULATION THERAPY VISIT (OUTPATIENT)
Dept: FAMILY MEDICINE | Facility: CLINIC | Age: 51
End: 2021-03-15

## 2021-03-15 DIAGNOSIS — I82.402 DEEP VEIN THROMBOSIS (DVT) OF LEFT LOWER EXTREMITY, UNSPECIFIED CHRONICITY, UNSPECIFIED VEIN (H): ICD-10-CM

## 2021-03-15 DIAGNOSIS — Z79.01 LONG TERM CURRENT USE OF ANTICOAGULANT THERAPY: ICD-10-CM

## 2021-03-15 DIAGNOSIS — I82.401 ACUTE DEEP VEIN THROMBOSIS (DVT) OF RIGHT LOWER EXTREMITY, UNSPECIFIED VEIN (H): ICD-10-CM

## 2021-03-15 NOTE — PROGRESS NOTES
ANTICOAGULATION  MANAGEMENT-Patient Home Monitoring Result    Assessment     Therapeutic INR result of 2.7 . Goal range 2.0-3.0. Received via fax from [employer] home INR monitoring company.        Previous INR was therapeutic    Donato was last contacted by phone: 3/15/2021    Plan     Per home monitoring agreement with patient, patient was NOT contacted regarding therapeutic result today.  Patient is to continue current dose and continue to check INR with home monitor per protocol.  ?       OBJECTIVE    INR   Date Value Ref Range Status   2021 2.7 (A) 0.90 - 1.10 Final       ASSESSMENT / PLAN  No question data found.  Anticoagulation Summary  As of 3/15/2021    INR goal:  2.0-3.0   TTR:  68.7 % (1 y)   INR used for dosin.7 (3/13/2021)   Warfarin maintenance plan:  5 mg (5 mg x 1) every Mon; 10 mg (5 mg x 2) all other days   Full warfarin instructions:  5 mg every Mon; 10 mg all other days   Weekly warfarin total:  65 mg   No change documented:  Eliza Donovan RN   Plan last modified:  Eliza Donovan RN (2020)   Next INR check:  3/26/2021   Priority:  Maintenance   Target end date:  Indefinite    Indications    Deep vein thrombosis (DVT) (H) [I82.409] [I82.409]  Long-term (current) use of anticoagulants [Z79.01] [Z79.01]  Deep vein thrombosis (DVT) of left lower extremity  unspecified chronicity  unspecified vein (H) [I82.402]             Anticoagulation Episode Summary     INR check location:      Preferred lab:  EXTERNAL LAB    Send INR reminders to:  GISEL CANCHOLA    Comments:  [facility] INR Monitoring patient      Anticoagulation Care Providers     Provider Role Specialty Phone number    Carter Skinner PA-C Referring Family Medicine [phone]

## 2021-03-15 NOTE — PROGRESS NOTES
Incoming fax from [employer] home monitoring company    Result date: 03/13  INR: 2.7    Due for call.

## 2021-03-31 ENCOUNTER — TRANSFERRED RECORDS (OUTPATIENT)
Dept: HEALTH INFORMATION MANAGEMENT | Facility: CLINIC | Age: 51
End: 2021-03-31

## 2021-04-01 ENCOUNTER — ANTICOAGULATION THERAPY VISIT (OUTPATIENT)
Dept: FAMILY MEDICINE | Facility: CLINIC | Age: 51
End: 2021-04-01

## 2021-04-01 DIAGNOSIS — I82.401 ACUTE DEEP VEIN THROMBOSIS (DVT) OF RIGHT LOWER EXTREMITY, UNSPECIFIED VEIN (H): ICD-10-CM

## 2021-04-01 DIAGNOSIS — Z79.01 LONG TERM CURRENT USE OF ANTICOAGULANT THERAPY: ICD-10-CM

## 2021-04-01 DIAGNOSIS — I82.402 DEEP VEIN THROMBOSIS (DVT) OF LEFT LOWER EXTREMITY, UNSPECIFIED CHRONICITY, UNSPECIFIED VEIN (H): ICD-10-CM

## 2021-04-01 LAB — INR PPP: 2.6 (ref 0.9–1.1)

## 2021-04-01 NOTE — PROGRESS NOTES
ANTICOAGULATION  MANAGEMENT-Patient Home Monitoring Result    Assessment     Therapeutic INR result of 2.6 . Goal range 2.0-3.0. Received via fax from [employer] home INR monitoring company.        Previous INR was therapeutic    Donato was last contacted by phone: 3/15/2021    Plan     Per home monitoring agreement with patient, patient was NOT contacted regarding therapeutic result today.  Patient is to continue current dose and continue to check INR with home monitor per protocol.  ?       OBJECTIVE    INR   Date Value Ref Range Status   2021 2.6 (A) 0.90 - 1.10 Final       ASSESSMENT / PLAN  No question data found.  Anticoagulation Summary  As of 2021    INR goal:  2.0-3.0   TTR:  72.5 % (1 y)   INR used for dosin.6 (3/31/2021)   Warfarin maintenance plan:  5 mg (5 mg x 1) every Mon; 10 mg (5 mg x 2) all other days   Full warfarin instructions:  5 mg every Mon; 10 mg all other days   Weekly warfarin total:  65 mg   No change documented:  Kia Domingo RN   Plan last modified:  Eliza Donovan RN (2020)   Next INR check:  4/15/2021   Priority:  Maintenance   Target end date:  Indefinite    Indications    Deep vein thrombosis (DVT) (H) [I82.409] [I82.409]  Long-term (current) use of anticoagulants [Z79.01] [Z79.01]  Deep vein thrombosis (DVT) of left lower extremity  unspecified chronicity  unspecified vein (H) [I82.402]             Anticoagulation Episode Summary     INR check location:      Preferred lab:  EXTERNAL LAB    Send INR reminders to:  GISEL CANCHOLA    Comments:  [facility] INR Monitoring patient      Anticoagulation Care Providers     Provider Role Specialty Phone number    Carter Skinner PA-C Referring Family Medicine [phone]

## 2021-04-19 ENCOUNTER — DOCUMENTATION ONLY (OUTPATIENT)
Dept: FAMILY MEDICINE | Facility: CLINIC | Age: 51
End: 2021-04-19

## 2021-04-19 ENCOUNTER — ANTICOAGULATION THERAPY VISIT (OUTPATIENT)
Dept: FAMILY MEDICINE | Facility: CLINIC | Age: 51
End: 2021-04-19

## 2021-04-19 ENCOUNTER — TRANSFERRED RECORDS (OUTPATIENT)
Dept: HEALTH INFORMATION MANAGEMENT | Facility: CLINIC | Age: 51
End: 2021-04-19

## 2021-04-19 DIAGNOSIS — I82.402 DEEP VEIN THROMBOSIS (DVT) OF LEFT LOWER EXTREMITY, UNSPECIFIED CHRONICITY, UNSPECIFIED VEIN (H): ICD-10-CM

## 2021-04-19 DIAGNOSIS — Z79.01 LONG TERM CURRENT USE OF ANTICOAGULANT THERAPY: ICD-10-CM

## 2021-04-19 DIAGNOSIS — I82.401 ACUTE DEEP VEIN THROMBOSIS (DVT) OF RIGHT LOWER EXTREMITY, UNSPECIFIED VEIN (H): ICD-10-CM

## 2021-04-19 DIAGNOSIS — K51.919 ULCERATIVE COLITIS WITH COMPLICATION, UNSPECIFIED LOCATION (H): Primary | ICD-10-CM

## 2021-04-19 LAB — INR PPP: 2 (ref 0.9–1.1)

## 2021-04-19 NOTE — PROGRESS NOTES
ANTICOAGULATION MANAGEMENT      Donato De Leon due for annual renewal of referral to anticoagulation monitoring. Order pended for your review and signature.      ANTICOAGULATION SUMMARY      Warfarin indication(s)     DVT    Heart valve present?  NO       Current goal range   INR: 2.0-3.0     Goal appropriate for indication? Yes, INR 2-3 appropriate for hx of DVT, PE, hypercoagulable state, Afib, LVAD, or bileaflet AVR without risk factors     Current duration of therapy Indefinite/long term therapy   Time in Therapeutic Range (TTR)  (Goal > 60%) 73 %       Office visit with referring provider's group within last year no on 10/10/2018       Kiran Escalona RN

## 2021-04-19 NOTE — PROGRESS NOTES
ANTICOAGULATION  MANAGEMENT-Patient Home Monitoring Result    Assessment     Therapeutic INR result of 2.0 . Goal range 2.0-3.0. Received via fax from [employer] home INR monitoring company.        Previous INR was therapeutic    Donato was last contacted by phone: 03/15/21    Plan     Per home monitoring agreement with patient, patient was NOT contacted regarding therapeutic result today.  Patient is to continue current dose and continue to check INR with home monitor per protocol.  ?       OBJECTIVE    INR   Date Value Ref Range Status   2021 2.0 (A) 0.90 - 1.10 Final       ASSESSMENT / PLAN  No question data found.  Anticoagulation Summary  As of 2021    INR goal:  2.0-3.0   TTR:  72.6 % (1 y)   INR used for dosin.0 (2021)   Warfarin maintenance plan:  5 mg (5 mg x 1) every Mon; 10 mg (5 mg x 2) all other days   Full warfarin instructions:  5 mg every Mon; 10 mg all other days   Weekly warfarin total:  65 mg   No change documented:  Kiran Escalona RN   Plan last modified:  Eliza Donovan RN (2020)   Next INR check:  5/3/2021   Priority:  Maintenance   Target end date:  Indefinite    Indications    Deep vein thrombosis (DVT) (H) [I82.409] [I82.409]  Long-term (current) use of anticoagulants [Z79.01] [Z79.01]  Deep vein thrombosis (DVT) of left lower extremity  unspecified chronicity  unspecified vein (H) [I82.402]             Anticoagulation Episode Summary     INR check location:      Preferred lab:  EXTERNAL LAB    Send INR reminders to:  GISEL CANCHOLA    Comments:  [employer] [facility] patient      Anticoagulation Care Providers     Provider Role Specialty Phone number    Carter Skinner PA-C Referring Family Medicine [phone]

## 2021-04-20 PROBLEM — I82.401 ACUTE DEEP VEIN THROMBOSIS (DVT) OF RIGHT LOWER EXTREMITY, UNSPECIFIED VEIN (H): Status: ACTIVE | Noted: 2021-04-20

## 2021-04-22 ENCOUNTER — TRANSFERRED RECORDS (OUTPATIENT)
Dept: HEALTH INFORMATION MANAGEMENT | Facility: CLINIC | Age: 51
End: 2021-04-22

## 2021-05-05 ENCOUNTER — DOCUMENTATION ONLY (OUTPATIENT)
Dept: FAMILY MEDICINE | Facility: CLINIC | Age: 51
End: 2021-05-05

## 2021-05-10 ENCOUNTER — ANTICOAGULATION THERAPY VISIT (OUTPATIENT)
Dept: FAMILY MEDICINE | Facility: CLINIC | Age: 51
End: 2021-05-10

## 2021-05-10 ENCOUNTER — TRANSFERRED RECORDS (OUTPATIENT)
Dept: HEALTH INFORMATION MANAGEMENT | Facility: CLINIC | Age: 51
End: 2021-05-10

## 2021-05-10 DIAGNOSIS — I82.409 DEEP VEIN THROMBOSIS (DVT) (H): ICD-10-CM

## 2021-05-10 DIAGNOSIS — I82.402 DEEP VEIN THROMBOSIS (DVT) OF LEFT LOWER EXTREMITY, UNSPECIFIED CHRONICITY, UNSPECIFIED VEIN (H): ICD-10-CM

## 2021-05-10 DIAGNOSIS — I82.401 ACUTE DEEP VEIN THROMBOSIS (DVT) OF RIGHT LOWER EXTREMITY, UNSPECIFIED VEIN (H): ICD-10-CM

## 2021-05-10 DIAGNOSIS — K51.919 ULCERATIVE COLITIS WITH COMPLICATION, UNSPECIFIED LOCATION (H): ICD-10-CM

## 2021-05-10 DIAGNOSIS — Z79.01 LONG TERM CURRENT USE OF ANTICOAGULANT THERAPY: ICD-10-CM

## 2021-05-10 LAB — INR PPP: 3.4 (ref 0.9–1.1)

## 2021-05-10 NOTE — PROGRESS NOTES
ANTICOAGULATION MANAGEMENT     Patient Name:  Donato De Leon  Date:  5/10/2021    ASSESSMENT /SUBJECTIVE:    Today's INR result of 3.4 is supratherapeutic. Goal INR of 2.0-3.0      Warfarin dose taken: Warfarin taken as instructed    Diet: No new diet changes affecting INR    Medication changes/ interactions: No new medications/supplements affecting INR    Previous INR: Therapeutic     S/S of bleeding or thromboembolism: No    New injury or illness: No    Upcoming surgery, procedure or cardioversion: No    Additional findings: None      PLAN:    Telephone call with Donato regarding INR result and instructed:     Warfarin Dosing Instructions: 7.5 mg tomorrow;  then continue your current warfarin dose of 5 mg M; 10 mg ROW    Instructed patient to follow up no later than: 2 weeks  Patient to recheck with home meter    Education provided: None required      Jerry verbalizes understanding and agrees to warfarin dosing plan.    Instructed to call the Anticoagulation Clinic for any changes, questions or concerns. (#[phone])        Chyna Allen RN      OBJECTIVE:  Recent labs: (last 7 days)     05/10/21   INR 3.4*         No question data found.  Anticoagulation Summary  As of 5/10/2021    INR goal:  2.0-3.0   TTR:  71.0 % (1 y)   INR used for dosing:  3.4 (5/10/2021)   Warfarin maintenance plan:  5 mg (5 mg x 1) every Mon; 10 mg (5 mg x 2) all other days   Full warfarin instructions:  5/11: 7.5 mg; Otherwise 5 mg every Mon; 10 mg all other days   Weekly warfarin total:  65 mg   Plan last modified:  Eliza Donovan RN (11/2/2020)   Next INR check:  5/24/2021   Priority:  Maintenance   Target end date:  Indefinite    Indications    Deep vein thrombosis (DVT) (H) [I82.409] [I82.409]  Long-term (current) use of anticoagulants [Z79.01] [Z79.01]  Deep vein thrombosis (DVT) of left lower extremity  unspecified chronicity  unspecified vein (H) [I82.402]  Ulcerative colitis with complication  unspecified location (H)  [Z56.294]  Acute deep vein thrombosis (DVT) of right lower extremity  unspecified vein (H) [I82.401]             Anticoagulation Episode Summary     INR check location:      Preferred lab:  EXTERNAL LAB    Send INR reminders to:  GISEL CANCHOLA    Comments:  [employer] [facility] Monitoring patient      Anticoagulation Care Providers     Provider Role Specialty Phone number    Carter Skinner PA-C Referring [facility] [phone]

## 2021-05-10 NOTE — PROGRESS NOTES
Anticoagulation Management    Unable to reach Jerry today.    Today's INR result of 3.4 is supratherapeutic (goal INR of 2.0-3.0).  Result received from: Home Monitor    Follow up required to discuss out of range INR     Left message to take reduced dose of warfarin, 2.5 mg tonight.      Anticoagulation clinic to follow up    April Alvarado RN

## 2021-05-10 NOTE — PROGRESS NOTES
Anticoagulation Management    Unable to reach Jerry today.    Today's INR result of 3.4 is supratherapeutic (goal INR of 2.0-3.0).  Result received from: Home Monitor    Follow up required to confirm warfarin dose taken and assess for changes    TCB      Anticoagulation clinic to follow up    AISSATOU Caldwell RN, BSN, PHN

## 2021-05-10 NOTE — PROGRESS NOTES
Patient left a VM returning a call. Please call when able.  Thank you.  Eliza Donovan RN  Anticoagulation Nurse - [facility]

## 2021-06-03 ENCOUNTER — DOCUMENTATION ONLY (OUTPATIENT)
Dept: FAMILY MEDICINE | Facility: CLINIC | Age: 51
End: 2021-06-03

## 2021-06-03 NOTE — PROGRESS NOTES
ANTICOAGULATION     Donato De Leon is overdue for INR check.      Reminder letter sent    Eliza Donovan RN

## 2021-06-03 NOTE — LETTER
Guillermina 3, 2021      Donato De Leon  [address]  [place] [address]      Dear Donato,    We are contacting you because our records show you were due for an INR on 5/24/21.      There are potentially serious risks when taking warfarin without careful monitoring, and we want to make sure you are safely managed.    Please check your INR with your home meter and report the INR result to your home meter company.  If you have any questions or concerns, please give us a call at [phone].  If there has been a change in your care, or other concerns, please let us know so we can help and/or update our records.    Sincerely,        Carter Skinner PA-C

## 2021-06-09 ENCOUNTER — ANTICOAGULATION THERAPY VISIT (OUTPATIENT)
Dept: FAMILY MEDICINE | Facility: CLINIC | Age: 51
End: 2021-06-09

## 2021-06-09 DIAGNOSIS — I82.401 ACUTE DEEP VEIN THROMBOSIS (DVT) OF RIGHT LOWER EXTREMITY, UNSPECIFIED VEIN (H): ICD-10-CM

## 2021-06-09 DIAGNOSIS — K51.919 ULCERATIVE COLITIS WITH COMPLICATION, UNSPECIFIED LOCATION (H): ICD-10-CM

## 2021-06-09 DIAGNOSIS — I82.402 DEEP VEIN THROMBOSIS (DVT) OF LEFT LOWER EXTREMITY, UNSPECIFIED CHRONICITY, UNSPECIFIED VEIN (H): ICD-10-CM

## 2021-06-09 DIAGNOSIS — I82.409 DEEP VEIN THROMBOSIS (DVT) (H): ICD-10-CM

## 2021-06-09 DIAGNOSIS — Z79.01 LONG TERM CURRENT USE OF ANTICOAGULANT THERAPY: ICD-10-CM

## 2021-06-09 LAB — INR PPP: 2.7 (ref 0.9–1.1)

## 2021-06-09 NOTE — PROGRESS NOTES
ANTICOAGULATION  MANAGEMENT-Patient Home Monitoring Result    Assessment     Therapeutic INR result of 2.7 . Goal range 2.0-3.0. Received via fax from [employer] home INR monitoring company.        Previous INR was therapeutic    Donato was last contacted by phone: 5/10    Plan     Per home monitoring agreement with patient, patient was NOT contacted regarding therapeutic result today.  Patient is to continue current dose and continue to check INR with home monitor per protocol.  ?       OBJECTIVE    INR   Date Value Ref Range Status   2021 2.7 (A) 0.90 - 1.10 Final       ASSESSMENT / PLAN  No question data found.  Anticoagulation Summary  As of 2021    INR goal:  2.0-3.0   TTR:  66.3 % (1 y)   INR used for dosin.7 (2021)   Warfarin maintenance plan:  5 mg (5 mg x 1) every Mon; 10 mg (5 mg x 2) all other days   Full warfarin instructions:  5 mg every Mon; 10 mg all other days   Weekly warfarin total:  65 mg   No change documented:  Kia Domingo RN   Plan last modified:  Eliza Donovan RN (2020)   Next INR check:  2021   Priority:  Maintenance   Target end date:  Indefinite    Indications    Deep vein thrombosis (DVT) (H) [I82.409] [I82.409]  Long-term (current) use of anticoagulants [Z79.01] [Z79.01]  Deep vein thrombosis (DVT) of left lower extremity  unspecified chronicity  unspecified vein (H) [I82.402]  Ulcerative colitis with complication  unspecified location (H) [K51.919]  Acute deep vein thrombosis (DVT) of right lower extremity  unspecified vein (H) [I82.401]             Anticoagulation Episode Summary     INR check location:      Preferred lab:  EXTERNAL LAB    Send INR reminders to:  GISEL CANCHOLA    Comments:  [employer] [facility] patient      Anticoagulation Care Providers     Provider Role Specialty Phone number    Carter Skinner PA-C Referring Family Medicine [phone]

## 2021-06-21 ENCOUNTER — TRANSFERRED RECORDS (OUTPATIENT)
Dept: HEALTH INFORMATION MANAGEMENT | Facility: CLINIC | Age: 51
End: 2021-06-21

## 2021-06-27 ENCOUNTER — TRANSFERRED RECORDS (OUTPATIENT)
Dept: HEALTH INFORMATION MANAGEMENT | Facility: CLINIC | Age: 51
End: 2021-06-27

## 2021-06-27 LAB — INR PPP: 2.7 (ref 0.9–1.1)

## 2021-06-28 ENCOUNTER — DOCUMENTATION ONLY (OUTPATIENT)
Dept: FAMILY MEDICINE | Facility: CLINIC | Age: 51
End: 2021-06-28

## 2021-06-28 DIAGNOSIS — Z79.01 LONG TERM CURRENT USE OF ANTICOAGULANT THERAPY: ICD-10-CM

## 2021-06-28 DIAGNOSIS — I82.401 ACUTE DEEP VEIN THROMBOSIS (DVT) OF RIGHT LOWER EXTREMITY, UNSPECIFIED VEIN (H): ICD-10-CM

## 2021-06-28 DIAGNOSIS — I82.409 DEEP VEIN THROMBOSIS (DVT) (H): ICD-10-CM

## 2021-06-28 DIAGNOSIS — I82.402 DEEP VEIN THROMBOSIS (DVT) OF LEFT LOWER EXTREMITY, UNSPECIFIED CHRONICITY, UNSPECIFIED VEIN (H): ICD-10-CM

## 2021-06-28 DIAGNOSIS — K51.919 ULCERATIVE COLITIS WITH COMPLICATION, UNSPECIFIED LOCATION (H): ICD-10-CM

## 2021-06-28 NOTE — PROGRESS NOTES
ANTICOAGULATION  MANAGEMENT-Patient Home Monitoring Result    Assessment     Therapeutic INR result of 2.7 . Goal range 2.0-3.0. Received via fax from [employer] home INR monitoring company.        Previous INR was therapeutic    Donato was last contacted by phone: 5/10    Plan     Per home monitoring agreement with patient, patient was NOT contacted regarding therapeutic result today.  Patient is to continue current dose and continue to check INR with home monitor per protocol.  ?       OBJECTIVE    INR   Date Value Ref Range Status   2021 2.7 (A) 0.90 - 1.10 Final       ASSESSMENT / PLAN  No question data found.  Anticoagulation Summary  As of 2021    INR goal:  2.0-3.0   TTR:  70.2 % (1 y)   INR used for dosin.7 (2021)   Warfarin maintenance plan:  5 mg (5 mg x 1) every Mon; 10 mg (5 mg x 2) all other days   Full warfarin instructions:  5 mg every Mon; 10 mg all other days   Weekly warfarin total:  65 mg   Plan last modified:  Eliza Donovan RN (2020)   Next INR check:  2021   Priority:  Maintenance   Target end date:  Indefinite    Indications    Deep vein thrombosis (DVT) (H) [I82.409] [I82.409]  Long-term (current) use of anticoagulants [Z79.01] [Z79.01]  Deep vein thrombosis (DVT) of left lower extremity  unspecified chronicity  unspecified vein (H) [I82.402]  Ulcerative colitis with complication  unspecified location (H) [K51.919]  Acute deep vein thrombosis (DVT) of right lower extremity  unspecified vein (H) [I82.401]             Anticoagulation Episode Summary     INR check location:      Preferred lab:  EXTERNAL LAB    Send INR reminders to:  GISEL CANCHOLA    Comments:  [employer] [facility] patient      Anticoagulation Care Providers     Provider Role Specialty Phone number    Carter Skinner PA-C Referring Family Medicine [phone]

## 2021-07-19 ENCOUNTER — ANTICOAGULATION THERAPY VISIT (OUTPATIENT)
Dept: FAMILY MEDICINE | Facility: CLINIC | Age: 51
End: 2021-07-19

## 2021-07-19 ENCOUNTER — TRANSFERRED RECORDS (OUTPATIENT)
Dept: HEALTH INFORMATION MANAGEMENT | Facility: CLINIC | Age: 51
End: 2021-07-19

## 2021-07-19 DIAGNOSIS — I82.402 DEEP VEIN THROMBOSIS (DVT) OF LEFT LOWER EXTREMITY, UNSPECIFIED CHRONICITY, UNSPECIFIED VEIN (H): ICD-10-CM

## 2021-07-19 DIAGNOSIS — I82.401 ACUTE DEEP VEIN THROMBOSIS (DVT) OF RIGHT LOWER EXTREMITY, UNSPECIFIED VEIN (H): ICD-10-CM

## 2021-07-19 DIAGNOSIS — I82.409 DEEP VEIN THROMBOSIS (DVT) (H): Primary | ICD-10-CM

## 2021-07-19 DIAGNOSIS — K51.919 ULCERATIVE COLITIS WITH COMPLICATION, UNSPECIFIED LOCATION (H): ICD-10-CM

## 2021-07-19 DIAGNOSIS — Z79.01 LONG TERM CURRENT USE OF ANTICOAGULANT THERAPY: ICD-10-CM

## 2021-07-19 LAB — INR PPP: 1.6

## 2021-07-19 NOTE — PROGRESS NOTES
Anticoagulation Management    Unable to reach Jerry today.    Today's INR result of 1.6 is subtherapeutic (goal INR of 2.0-3.0).  Result received from: Home Monitor    Follow up required to confirm warfarin dose taken and assess for changes    left message for Jerry to give us a call back      Anticoagulation clinic to follow up    Eliza Donovan, AISSATOU    Please transfer to Eliza [place]: [phone]

## 2021-07-19 NOTE — PROGRESS NOTES
ANTICOAGULATION MANAGEMENT     Donato De Leon 51 year old male is on warfarin with subtherapeutic INR result. (Goal INR 2.0-3.0)    Recent labs: (last 7 days)     07/19/21  0000   INR 1.6       ASSESSMENT     Source(s): Patient/Caregiver Call       Warfarin doses taken: Warfarin taken as instructed    Diet: No new diet changes identified    New illness, injury, or hospitalization: No    Medication/supplement changes: None noted    Signs or symptoms of bleeding or clotting: No    Previous INR: Therapeutic last 2(+) visits    Additional findings: None     PLAN     Recommended plan for no diet, medication or health factor changes affecting INR     Dosing Instructions: Booster dose then continue your current warfarin dose with next INR in 1 week       Summary  As of 7/19/2021    Full warfarin instructions:  7/19: 10 mg; Otherwise 5 mg every Mon; 10 mg all other days   Next INR check:               Detailed voice message left for Donato with dosing instructions and follow up date.     Patient to recheck with home meter    Education provided: Please call back if any changes to your diet, medications or how you've been taking warfarin    Plan made per [facility] anticoagulation protocol    Eliza Donovan, RN  Anticoagulation Clinic  7/19/2021    _______________________________________________________________________     Anticoagulation Episode Summary     Current INR goal:  2.0-3.0   TTR:  70.5 % (1 y)   Target end date:  Indefinite   Send INR reminders to:  GISEL CANCHOLA    Indications    Deep vein thrombosis (DVT) (H) [I82.409] [I82.409]  Long-term (current) use of anticoagulants [Z79.01] [Z79.01]  Deep vein thrombosis (DVT) of left lower extremity  unspecified chronicity  unspecified vein (H) [I82.402]  Ulcerative colitis with complication  unspecified location (H) [K51.919]  Acute deep vein thrombosis (DVT) of right lower extremity  unspecified vein (H) [I82.401]           Comments:  [employer] [facility] patient          Anticoagulation Care Providers     Provider Role Specialty Phone number    Carter Skinner PA-C Referring Family Medicine [phone]

## 2021-07-19 NOTE — PROGRESS NOTES
Left another VM asking if any changes overall or missed doses.    Booster dose recommended today then resume normal dosing there after.    Eliza Donovan, RN  Anticoagulation Nurse - [facility] [place]

## 2021-07-19 NOTE — PROGRESS NOTES
Patient left a VM @ 1101 am regarding DOSING INSTRUCTIONS.    Routing to managing [facility].      Jammie Baltazar RN  [facility]

## 2021-08-02 ENCOUNTER — NURSE TRIAGE (OUTPATIENT)
Dept: FAMILY MEDICINE | Facility: CLINIC | Age: 51
End: 2021-08-02

## 2021-08-02 ENCOUNTER — OFFICE VISIT (OUTPATIENT)
Dept: URGENT CARE | Facility: URGENT CARE | Age: 51
End: 2021-08-02
Payer: COMMERCIAL

## 2021-08-02 VITALS
SYSTOLIC BLOOD PRESSURE: 127 MMHG | OXYGEN SATURATION: 100 % | BODY MASS INDEX: 34.51 KG/M2 | HEART RATE: 83 BPM | TEMPERATURE: 97.7 F | WEIGHT: 261.6 LBS | DIASTOLIC BLOOD PRESSURE: 93 MMHG

## 2021-08-02 DIAGNOSIS — L23.7 CONTACT DERMATITIS DUE TO POISON IVY: Primary | ICD-10-CM

## 2021-08-02 PROCEDURE — 99214 OFFICE O/P EST MOD 30 MIN: CPT | Performed by: NURSE PRACTITIONER

## 2021-08-02 RX ORDER — PREDNISONE 20 MG/1
TABLET ORAL
Qty: 20 TABLET | Refills: 0 | Status: SHIPPED | OUTPATIENT
Start: 2021-08-02 | End: 2022-06-25

## 2021-08-02 ASSESSMENT — ENCOUNTER SYMPTOMS
COLOR CHANGE: 1
FEVER: 0
DIAPHORESIS: 0
FATIGUE: 0
ACTIVITY CHANGE: 0
CHILLS: 0

## 2021-08-02 NOTE — PATIENT INSTRUCTIONS
Patient Education     Poison Ivy Rash  You have a rash and itching. This is a delayed reaction to the oils of the poison ivy plant. You likely came in contact with it during the 3 days before your symptoms started. Your skin will become red and itchy. Small blisters may appear. These can break and leak a clear yellow fluid. This fluid is not contagious. The reaction usually starts to go away after 1 to 2 weeks. But it may take 4 to 6 weeks to fully clear.     Home care  Follow these guidelines when caring for yourself at home:    The plant oils still on your skin or clothes can be spread to other places on your body. They can also be passed on to other people and cause a similar reaction. That s why it s important to wash all of the plant oils off your skin and any clothes that may have been exposed. Wash all clothes that you were wearing. Use hot water with ordinary laundry detergent. Dogs and cats that have been in poison ivy may not get a rash, but the oil may be on their fur. Be sure to wash your pets, too.    Don't use over-the-counter antibiotic creams such as neomycin or bacitracin. These may make the rash worse.    Stay away from anything that heats up your skin. This includes hot showers or baths, or direct sunlight. These can make itching worse.    Put a cold compress on areas that are leaking (weeping), or on blistered areas. Do this for 30 minutes 3 times a day. To make a cold compress, dip a wash cloth in a mixture of 1 pint of cold water and 1 packet of astringent or oatmeal bath powder. Keep the solution in the refrigerator for future use.    If large areas of skin are affected, take a lukewarm bath. Add colloidal oatmeal, or 1 cup of cornstarch or baking soda to the water.    For a rash in a smaller area, use hydrocortisone cream for redness and irritation. But don t use this if another medicine was prescribed. For severe itching, put an ice pack on the area. To make an ice pack, put ice cubes in a  plastic bag that seals at the top. Wrap the bag in a clean, thin towel or cloth. Never put ice or an ice pack directly on the skin. Over-the-counter products that have calamine lotion may also be helpful.    You can also use an oral antihistamine medicine with diphenhydramine for itching, unless another medicine was prescribed. This medicine may make you sleepy. So use lower doses during the daytime and higher doses at bedtime. Don t use medicine that has diphenhydramine if you have glaucoma. Also don t use it if you are a man who has trouble urinating because of an enlarged prostate. Antihistamines with loratidine cause less drowsiness. They are a good choice for daytime use.    For severe cases, your provider may prescribe oral steroid medicines. Always take these exactly as prescribed.  Follow-up care  Follow up with your healthcare provider, or as directed. Call your provider if your rash gets worse or you are not starting to get better after 1 week of treatment.   When to seek medical advice  Call your healthcare provider or seek medical attention right away if any of these occur:     Spreading facial rash with swollen mouth or eyelids    Rash that spreads to the groin and causes swelling of the penis, scrotum, or vaginal area    Trouble urinating because of swelling in the genital area  Also call your provider if you have signs of infection in the areas of broken blisters:    Spreading redness    Pus or fluid draining from the blisters    Yellow-brown crusts form over the open blisters    Fever of 100.4 F (38.0 C), or as directed by your provider  Call 911  Call 911 if you have trouble breathing or swallowing. Or if you have severe swelling on your face, eyelids, mouth, throat, or tongue.   [employer] last reviewed this educational content on 8/1/2019 2000-2021 [employer]. All rights reserved. This information is not intended as a substitute for professional medical care. Always follow your  healthcare professional's instructions.

## 2021-08-02 NOTE — PROGRESS NOTES
SUBJECTIVE:   Donato De Leon is a 51 year old male presenting with rash located on his bilateral lower and upper extremities, side of his right face and behind his neck. Patient states he was outdoors and concerned of poison ivy rash. Patient shares he has been using hydrocortisone cream with minimal relief of itching and redness. Patient denies any fever, chills, or shakes.    Chief Complaint   Patient presents with     Derm Problem     Started friday. All over legs, arms, neck and started on his right side of face this morning. Was draining clear liquid. Itching, was using hydrocortison cream, not touching the itch.      Review of Systems   Constitutional: Negative for activity change, chills, diaphoresis, fatigue and fever.   Musculoskeletal:        Itching associated with rash   Skin: Positive for color change and rash.        Itching associated with rash   All other systems reviewed and are negative.    Past Medical History:   Diagnosis Date     Acute gout of right foot, unspecified cause 5/3/2017     Colitis, ulcerative (H)      Deep vein thrombosis (DVT) of left lower extremity, unspecified chronicity, unspecified vein (H) 7/11/2017     Long-term (current) use of anticoagulants [Z79.01] 3/29/2017     Renal lithiasis      Family History   Problem Relation Age of Onset     Clotting Disorder Sister      Current Outpatient Medications   Medication Sig Dispense Refill     predniSONE (DELTASONE) 20 MG tablet Take 3 tabs by mouth daily x 3 days, then 2 tabs daily x 3 days, then 1 tab daily x 3 days, then 1/2 tab daily x 3 days. 20 tablet 0     warfarin ANTICOAGULANT (COUMADIN) 5 MG tablet TAKE 1 TABLET BY MOUTH ONCE DAILY ON MONDAY AND 2 TABS ONCE DAILY ALL OTHER DAYS OR AS DIRECTED. 156 tablet 0     Acetaminophen (TYLENOL PO) Take by mouth as needed for mild pain or fever (Patient not taking: Reported on 8/2/2021)       enoxaparin ANTICOAGULANT (LOVENOX) 120 MG/0.8ML syringe Inject 0.8 mLs (120 mg) Subcutaneous  every 12 hours Stop after am dose on 9/8. No dose on 9/9 and resume 9/10 in am. (Patient not taking: Reported on 8/2/2021) 14 Syringe 1     folic acid (FOLVITE) 1 MG tablet TAKE 1 TABLET BY ORAL ROUTE EVERY DAY (Patient not taking: Reported on 8/2/2021)  3     methotrexate sodium, pres-free, 50 MG/2ML SOLN injection CHEMO INJECT 1 MILLILITER BY SUBCUTANEOUS ROUTE EVERY WEEK (Patient not taking: Reported on 8/2/2021)  2     Social History     Tobacco Use     Smoking status: Never Smoker     Smokeless tobacco: Never Used   Substance Use Topics     Alcohol use: Yes     Comment: rare       OBJECTIVE  BP (!) 127/93   Pulse 83   Temp 97.7  F (36.5  C) (Tympanic)   Wt 118.7 kg (261 lb 9.6 oz)   SpO2 100%   BMI 34.51 kg/m      Physical Exam  Vitals reviewed.   Constitutional:       General: He is not in acute distress.     Appearance: Normal appearance. He is normal weight. He is not ill-appearing, toxic-appearing or diaphoretic.   Eyes:      Extraocular Movements: Extraocular movements intact.      Conjunctiva/sclera: Conjunctivae normal.      Pupils: Pupils are equal, round, and reactive to light.   Cardiovascular:      Pulses: Normal pulses.      Heart sounds: Normal heart sounds. No murmur heard.   No friction rub. No gallop.    Pulmonary:      Effort: Pulmonary effort is normal. No respiratory distress.      Breath sounds: Normal breath sounds. No stridor. No wheezing, rhonchi or rales.   Chest:      Chest wall: No tenderness.   Musculoskeletal:         General: No swelling or tenderness.   Skin:     Capillary Refill: Capillary refill takes less than 2 seconds.      Findings: Erythema and rash present.   Neurological:      Mental Status: He is alert and oriented to person, place, and time.       ASSESSMENT:      ICD-10-CM    1. Contact dermatitis due to poison ivy  L23.7 predniSONE (DELTASONE) 20 MG tablet        Medical Decision Making:    Differential Diagnosis:  Poison Ivy Rash    PLAN:  Complete Prednisone  taper as discussed. Homecare educational material to manage poison ivy rash provided. Calamine lotion or Hydrocortisone cream to help alleviate itching/redness. Patient to avoid irritating skin, cold compresses to provide comfort.     Followup:  Return to be seen with new or worsening symptoms.       Patient Instructions     Patient Education     Poison Ivy Rash  You have a rash and itching. This is a delayed reaction to the oils of the poison ivy plant. You likely came in contact with it during the 3 days before your symptoms started. Your skin will become red and itchy. Small blisters may appear. These can break and leak a clear yellow fluid. This fluid is not contagious. The reaction usually starts to go away after 1 to 2 weeks. But it may take 4 to 6 weeks to fully clear.     Home care  Follow these guidelines when caring for yourself at home:    The plant oils still on your skin or clothes can be spread to other places on your body. They can also be passed on to other people and cause a similar reaction. That s why it s important to wash all of the plant oils off your skin and any clothes that may have been exposed. Wash all clothes that you were wearing. Use hot water with ordinary laundry detergent. Dogs and cats that have been in poison ivy may not get a rash, but the oil may be on their fur. Be sure to wash your pets, too.    Don't use over-the-counter antibiotic creams such as neomycin or bacitracin. These may make the rash worse.    Stay away from anything that heats up your skin. This includes hot showers or baths, or direct sunlight. These can make itching worse.    Put a cold compress on areas that are leaking (weeping), or on blistered areas. Do this for 30 minutes 3 times a day. To make a cold compress, dip a wash cloth in a mixture of 1 pint of cold water and 1 packet of astringent or oatmeal bath powder. Keep the solution in the refrigerator for future use.    If large areas of skin are affected,  take a lukewarm bath. Add colloidal oatmeal, or 1 cup of cornstarch or baking soda to the water.    For a rash in a smaller area, use hydrocortisone cream for redness and irritation. But don t use this if another medicine was prescribed. For severe itching, put an ice pack on the area. To make an ice pack, put ice cubes in a plastic bag that seals at the top. Wrap the bag in a clean, thin towel or cloth. Never put ice or an ice pack directly on the skin. Over-the-counter products that have calamine lotion may also be helpful.    You can also use an oral antihistamine medicine with diphenhydramine for itching, unless another medicine was prescribed. This medicine may make you sleepy. So use lower doses during the daytime and higher doses at bedtime. Don t use medicine that has diphenhydramine if you have glaucoma. Also don t use it if you are a man who has trouble urinating because of an enlarged prostate. Antihistamines with loratidine cause less drowsiness. They are a good choice for daytime use.    For severe cases, your provider may prescribe oral steroid medicines. Always take these exactly as prescribed.  Follow-up care  Follow up with your healthcare provider, or as directed. Call your provider if your rash gets worse or you are not starting to get better after 1 week of treatment.   When to seek medical advice  Call your healthcare provider or seek medical attention right away if any of these occur:     Spreading facial rash with swollen mouth or eyelids    Rash that spreads to the groin and causes swelling of the penis, scrotum, or vaginal area    Trouble urinating because of swelling in the genital area  Also call your provider if you have signs of infection in the areas of broken blisters:    Spreading redness    Pus or fluid draining from the blisters    Yellow-brown crusts form over the open blisters    Fever of 100.4 F (38.0 C), or as directed by your provider  Call 911  Call 911 if you have trouble  breathing or swallowing. Or if you have severe swelling on your face, eyelids, mouth, throat, or tongue.   [employer] last reviewed this educational content on 8/1/2019 2000-2021 [employer]. All rights reserved. This information is not intended as a substitute for professional medical care. Always follow your healthcare professional's instructions.

## 2021-08-02 NOTE — TELEPHONE ENCOUNTER
"  Patient states has been using hydrocortisone 10 cream but isn't helping the itching.  States has used benadryl orally but that gives no relief either.  Will be seen urgent care.   Reason for Disposition    SEVERE itching interferes with normal activities (e.g., work or school) or prevents sleep    Additional Information    Negative: Doesn't match the SYMPTOMS of poison ivy, oak, or sumac    Negative: Difficulty breathing or severe coughing following exposure to burning weeds    Negative: Patient sounds very sick or weak to the triager    Negative: Fever and bright red area or streak (from open poison ivy sores)    Negative: Increasing redness around poison ivy and larger than 2 inches (5 cm)    Answer Assessment - Initial Assessment Questions  1. APPEARANCE of RASH: \"Describe the rash.\"       Red, bumpy, itchy  2. LOCATION: \"Where is the rash located?\"       From ankles to knees and wrists to elbows bilaterally.  Also on one cheek  3. SIZE: \"How large is the rash?\"       See above  4. ONSET: \"When did the rash begin?\"       1-2 weeks  5. ITCHING: \"Does the rash itch?\" If so, ask: \"How bad is it?\"    - MILD - doesn't interfere with normal activities    - MODERATE-SEVERE: interferes with work, school, sleep, or other activities       Yes.  Very itchy  6. PREGNANCY: \"Is there any chance you are pregnant?\" \"When was your last menstrual period?\"      n/a    Protocols used: POISON IVY - OAK - SUMAC-A-OH      "

## 2021-08-05 ENCOUNTER — ANTICOAGULATION THERAPY VISIT (OUTPATIENT)
Dept: FAMILY MEDICINE | Facility: CLINIC | Age: 51
End: 2021-08-05

## 2021-08-05 ENCOUNTER — TRANSFERRED RECORDS (OUTPATIENT)
Dept: HEALTH INFORMATION MANAGEMENT | Facility: CLINIC | Age: 51
End: 2021-08-05

## 2021-08-05 DIAGNOSIS — Z79.01 LONG TERM CURRENT USE OF ANTICOAGULANT THERAPY: ICD-10-CM

## 2021-08-05 DIAGNOSIS — I82.409 DEEP VEIN THROMBOSIS (DVT) (H): Primary | ICD-10-CM

## 2021-08-05 DIAGNOSIS — I82.401 ACUTE DEEP VEIN THROMBOSIS (DVT) OF RIGHT LOWER EXTREMITY, UNSPECIFIED VEIN (H): ICD-10-CM

## 2021-08-05 DIAGNOSIS — K51.919 ULCERATIVE COLITIS WITH COMPLICATION, UNSPECIFIED LOCATION (H): ICD-10-CM

## 2021-08-05 DIAGNOSIS — I82.402 DEEP VEIN THROMBOSIS (DVT) OF LEFT LOWER EXTREMITY, UNSPECIFIED CHRONICITY, UNSPECIFIED VEIN (H): ICD-10-CM

## 2021-08-05 LAB — INR (EXTERNAL): 2.4 (ref 0.9–1.1)

## 2021-08-05 NOTE — CONFIDENTIAL NOTE
Anticoagulation Management    Unable to reach Jerry today x2    Today's INR result of 2.4 is therapeutic (goal INR of 2.0-3.0).  Result received from: Home Monitor    Follow up required to confirm warfarin dose taken and assess for changes    LMTCB [facility]     Left instructions to take 10mg warfarin tonight and call back [facility] tomorrow.      Anticoagulation clinic to follow up    Kiran Escalona RN

## 2021-08-06 ENCOUNTER — TRANSFERRED RECORDS (OUTPATIENT)
Dept: HEALTH INFORMATION MANAGEMENT | Facility: CLINIC | Age: 51
End: 2021-08-06

## 2021-08-06 NOTE — PROGRESS NOTES
ANTICOAGULATION MANAGEMENT     Donato De Leon 51 year old male is on warfarin with therapeutic INR result. (Goal INR 2.0-3.0)    Recent labs: (last 7 days)     08/05/21  1531   INR 2.4*       ASSESSMENT     Source(s): Chart Review and Patient/Caregiver Call       Warfarin doses taken: Warfarin taken as instructed    Diet: No new diet changes identified    New illness, injury, or hospitalization: No    Medication/supplement changes: None noted    Signs or symptoms of bleeding or clotting: No    Previous INR: Subtherapeutic    Additional findings: None     PLAN     Recommended plan for no diet, medication or health factor changes affecting INR     Dosing Instructions: Continue your current warfarin dose with next INR in 2 weeks       Summary  As of 8/5/2021    Full warfarin instructions:  5 mg every Mon; 10 mg all other days   Next INR check:               Telephone call with Donato who verbalizes understanding and agrees to plan    Patient to recheck with home meter    Education provided: Contact [phone]  with any changes, questions or concerns.     Plan made per [facility] anticoagulation protocol    April Alvarado RN  Anticoagulation Clinic  8/6/2021    _______________________________________________________________________     Anticoagulation Episode Summary     Current INR goal:  2.0-3.0   TTR:  70.2 % (1 y)   Target end date:  Indefinite   Send INR reminders to:  GISEL CANCHOLA    Indications    Deep vein thrombosis (DVT) (H) [I82.409] [I82.409]  Long-term (current) use of anticoagulants [Z79.01] [Z79.01]  Deep vein thrombosis (DVT) of left lower extremity  unspecified chronicity  unspecified vein (H) [I82.402]  Ulcerative colitis with complication  unspecified location (H) [K51.919]  Acute deep vein thrombosis (DVT) of right lower extremity  unspecified vein (H) [I82.401]           Comments:  [facility] INR Monitoring patient         Anticoagulation Care Providers     Provider Role Specialty Phone number     Carter Skinner PA-C Referring Family Medicine [phone]          April Campa RN, BSN, PHN  Anticoagulation Nurse  [phone]

## 2021-08-11 ENCOUNTER — TRANSFERRED RECORDS (OUTPATIENT)
Dept: HEALTH INFORMATION MANAGEMENT | Facility: CLINIC | Age: 51
End: 2021-08-11

## 2021-08-11 ENCOUNTER — OFFICE VISIT (OUTPATIENT)
Dept: FAMILY MEDICINE | Facility: CLINIC | Age: 51
End: 2021-08-11
Payer: COMMERCIAL

## 2021-08-11 VITALS
BODY MASS INDEX: 33.27 KG/M2 | HEIGHT: 73 IN | DIASTOLIC BLOOD PRESSURE: 87 MMHG | TEMPERATURE: 98.1 F | OXYGEN SATURATION: 98 % | HEART RATE: 78 BPM | RESPIRATION RATE: 16 BRPM | SYSTOLIC BLOOD PRESSURE: 128 MMHG | WEIGHT: 251 LBS

## 2021-08-11 DIAGNOSIS — Z79.01 LONG TERM CURRENT USE OF ANTICOAGULANT THERAPY: ICD-10-CM

## 2021-08-11 DIAGNOSIS — K51.919 ULCERATIVE COLITIS WITH COMPLICATION, UNSPECIFIED LOCATION (H): ICD-10-CM

## 2021-08-11 DIAGNOSIS — B35.6 JOCK ITCH: ICD-10-CM

## 2021-08-11 DIAGNOSIS — M10.9 GOUTY ARTHRITIS OF TOE OF RIGHT FOOT: ICD-10-CM

## 2021-08-11 DIAGNOSIS — Z00.00 ROUTINE GENERAL MEDICAL EXAMINATION AT A HEALTH CARE FACILITY: Primary | ICD-10-CM

## 2021-08-11 DIAGNOSIS — E78.00 HIGH CHOLESTEROL: ICD-10-CM

## 2021-08-11 DIAGNOSIS — Z20.822 EXPOSURE TO COVID-19 VIRUS: ICD-10-CM

## 2021-08-11 DIAGNOSIS — I82.402 DEEP VEIN THROMBOSIS (DVT) OF LEFT LOWER EXTREMITY, UNSPECIFIED CHRONICITY, UNSPECIFIED VEIN (H): ICD-10-CM

## 2021-08-11 PROBLEM — I82.401 ACUTE DEEP VEIN THROMBOSIS (DVT) OF RIGHT LOWER EXTREMITY, UNSPECIFIED VEIN (H): Status: RESOLVED | Noted: 2021-04-20 | Resolved: 2021-08-11

## 2021-08-11 PROBLEM — I82.409 DEEP VEIN THROMBOSIS (DVT) (H): Status: RESOLVED | Noted: 2017-03-29 | Resolved: 2021-08-11

## 2021-08-11 LAB
ALT SERPL-CCNC: 27 U/L (ref 0–78)
ANION GAP SERPL CALCULATED.3IONS-SCNC: 5 MMOL/L (ref 3–14)
AST SERPL-CCNC: 17 U/L (ref 0–37)
BUN SERPL-MCNC: 19 MG/DL (ref 7–30)
CALCIUM SERPL-MCNC: 9.2 MG/DL (ref 8.5–10.1)
CHLORIDE BLD-SCNC: 103 MMOL/L (ref 94–109)
CHOLEST SERPL-MCNC: 288 MG/DL
CO2 SERPL-SCNC: 26 MMOL/L (ref 20–32)
CREAT SERPL-MCNC: 1.39 MG/DL (ref 0.66–1.25)
FASTING STATUS PATIENT QL REPORTED: YES
GFR SERPL CREATININE-BSD FRML MDRD: 58 ML/MIN/1.73M2
GLUCOSE BLD-MCNC: 94 MG/DL (ref 70–99)
HDLC SERPL-MCNC: 77 MG/DL
LDLC SERPL CALC-MCNC: 194 MG/DL
NONHDLC SERPL-MCNC: 211 MG/DL
POTASSIUM BLD-SCNC: 4.4 MMOL/L (ref 3.4–5.3)
SODIUM SERPL-SCNC: 134 MMOL/L (ref 133–144)
TRIGL SERPL-MCNC: 86 MG/DL

## 2021-08-11 PROCEDURE — 99000 SPECIMEN HANDLING OFFICE-LAB: CPT | Performed by: PHYSICIAN ASSISTANT

## 2021-08-11 PROCEDURE — 36415 COLL VENOUS BLD VENIPUNCTURE: CPT | Performed by: PHYSICIAN ASSISTANT

## 2021-08-11 PROCEDURE — 86769 SARS-COV-2 COVID-19 ANTIBODY: CPT | Mod: 90 | Performed by: PHYSICIAN ASSISTANT

## 2021-08-11 PROCEDURE — 80048 BASIC METABOLIC PNL TOTAL CA: CPT | Performed by: PHYSICIAN ASSISTANT

## 2021-08-11 PROCEDURE — 80061 LIPID PANEL: CPT | Performed by: PHYSICIAN ASSISTANT

## 2021-08-11 PROCEDURE — 99396 PREV VISIT EST AGE 40-64: CPT | Performed by: PHYSICIAN ASSISTANT

## 2021-08-11 ASSESSMENT — ENCOUNTER SYMPTOMS
ARTHRALGIAS: 0
CONSTIPATION: 0
HEMATOCHEZIA: 0
WEAKNESS: 0
MYALGIAS: 0
HEMATURIA: 0
COUGH: 0
NERVOUS/ANXIOUS: 0
FREQUENCY: 0
PARESTHESIAS: 0
NAUSEA: 0
HEARTBURN: 0
JOINT SWELLING: 0
CHILLS: 0
DYSURIA: 0
SORE THROAT: 0
FEVER: 0
SHORTNESS OF BREATH: 0
ABDOMINAL PAIN: 0
EYE PAIN: 0
PALPITATIONS: 0
DIARRHEA: 0
HEADACHES: 0
DIZZINESS: 0

## 2021-08-11 ASSESSMENT — MIFFLIN-ST. JEOR: SCORE: 2047.41

## 2021-08-11 ASSESSMENT — PAIN SCALES - GENERAL: PAINLEVEL: NO PAIN (0)

## 2021-08-11 NOTE — PROGRESS NOTES
SUBJECTIVE:   CC: Donato De Leon is an 51 year old male who presents for preventative health visit.       Patient has been advised of split billing requirements and indicates understanding: Yes  Healthy Habits:     Getting at least 3 servings of Calcium per day:  Yes    Bi-annual eye exam:  NO    Dental care twice a year:  Yes    Sleep apnea or symptoms of sleep apnea:  Excessive snoring    Diet:  Regular (no restrictions)    Frequency of exercise:  6-7 days/week    Duration of exercise:  30-45 minutes    Taking medications regularly:  Yes    Medication side effects:  None    PHQ-2 Total Score: 0    Additional concerns today:  No    Needs paperwork completed  Would like covid antibody testing done -states his wife and son got Covid about 9 months ago.  He never had any symptoms.    Also has a history of ulcerative colitis in DVT and gout.  He states his ulcerative colitis is under control which has secondarily controlled his gout in history of clotting.  He sees a specialist regularly.      Today's PHQ-2 Score:   PHQ-2 ( 1999 Pfizer) 8/11/2021   Q1: Little interest or pleasure in doing things 0   Q2: Feeling down, depressed or hopeless 0   PHQ-2 Score 0   Q1: Little interest or pleasure in doing things Not at all   Q2: Feeling down, depressed or hopeless Not at all   PHQ-2 Score 0       Abuse: Current or Past(Physical, Sexual or Emotional)- No  Do you feel safe in your environment? Yes      Social History     Tobacco Use     Smoking status: Never Smoker     Smokeless tobacco: Never Used   Substance Use Topics     Alcohol use: Yes     Comment: rare         Alcohol Use 8/11/2021   Prescreen: >3 drinks/day or >7 drinks/week? No   Prescreen: >3 drinks/day or >7 drinks/week? -       Last PSA: No results found for: PSA    Reviewed orders with patient. Reviewed health maintenance and updated orders accordingly - Yes  Lab work is in process  Labs reviewed in EPIC  BP Readings from Last 3 Encounters:   08/11/21 128/87    08/02/21 (!) 127/93   10/10/18 136/84    Wt Readings from Last 3 Encounters:   08/11/21 113.9 kg (251 lb)   08/02/21 118.7 kg (261 lb 9.6 oz)   10/10/18 114.3 kg (252 lb)                  Patient Active Problem List   Diagnosis     CARDIOVASCULAR SCREENING; LDL GOAL LESS THAN 160     Cellulitis of arm, right     Plantar fasciitis     Renal lithiasis     Gouty arthritis of toe of right foot     BMI 32.0-32.9,adult     Ulcerative colitis with complication, unspecified location (H)     Long-term (current) use of anticoagulants [Z79.01]     Acute gout of right foot, unspecified cause     Deep vein thrombosis (DVT) of left lower extremity, unspecified chronicity, unspecified vein (H)     Elevated LFTs     Past Surgical History:   Procedure Laterality Date     HC FRAGMENTING OF KIDNEY STONE       SKIN CANCER SCREENING      basal cell        Social History     Tobacco Use     Smoking status: Never Smoker     Smokeless tobacco: Never Used   Substance Use Topics     Alcohol use: Yes     Comment: rare     Family History   Problem Relation Age of Onset     Clotting Disorder Sister          Current Outpatient Medications   Medication Sig Dispense Refill     predniSONE (DELTASONE) 20 MG tablet Take 3 tabs by mouth daily x 3 days, then 2 tabs daily x 3 days, then 1 tab daily x 3 days, then 1/2 tab daily x 3 days. 20 tablet 0     warfarin ANTICOAGULANT (COUMADIN) 5 MG tablet TAKE 1 TABLET BY MOUTH ONCE DAILY ON MONDAY AND 2 TABS ONCE DAILY ALL OTHER DAYS OR AS DIRECTED. 156 tablet 0     Acetaminophen (TYLENOL PO) Take by mouth as needed for mild pain or fever (Patient not taking: Reported on 8/2/2021)       Allergies   Allergen Reactions     Nkda [No Known Drug Allergies]        Reviewed and updated as needed this visit by clinical staff  Tobacco  Allergies  Meds  Problems             Reviewed and updated as needed this visit by Provider    Meds  Problems              Past Medical History:   Diagnosis Date     Acute gout  "of right foot, unspecified cause 5/3/2017     Colitis, ulcerative (H)      Deep vein thrombosis (DVT) of left lower extremity, unspecified chronicity, unspecified vein (H) 7/11/2017     Long-term (current) use of anticoagulants [Z79.01] 3/29/2017     Renal lithiasis       Past Surgical History:   Procedure Laterality Date     HC FRAGMENTING OF KIDNEY STONE       SKIN CANCER SCREENING      basal cell        Review of Systems   Constitutional: Negative for chills and fever.   HENT: Negative for congestion, ear pain, hearing loss and sore throat.    Eyes: Negative for pain and visual disturbance.   Respiratory: Negative for cough and shortness of breath.    Cardiovascular: Negative for chest pain, palpitations and peripheral edema.   Gastrointestinal: Negative for abdominal pain, constipation, diarrhea, heartburn, hematochezia and nausea.   Genitourinary: Negative for dysuria, frequency, genital sores, hematuria and urgency.   Musculoskeletal: Negative for arthralgias, joint swelling and myalgias.   Skin: Negative for rash.   Neurological: Negative for dizziness, weakness, headaches and paresthesias.   Psychiatric/Behavioral: Negative for mood changes. The patient is not nervous/anxious.        OBJECTIVE:   /87   Pulse 78   Temp 98.1  F (36.7  C) (Tympanic)   Resp 16   Ht 1.854 m (6' 1\")   Wt 113.9 kg (251 lb)   SpO2 98%   BMI 33.12 kg/m      Physical Exam  GENERAL: healthy, alert and no distress  EYES: Eyes grossly normal to inspection, PERRL and conjunctivae and sclerae normal  HENT: ear canals and TM's normal, nose and mouth without ulcers or lesions  NECK: no adenopathy, no asymmetry, masses, or scars and thyroid normal to palpation  RESP: lungs clear to auscultation - no rales, rhonchi or wheezes  CV: regular rate and rhythm, normal S1 S2, no S3 or S4, no murmur, click or rub, no peripheral edema and peripheral pulses strong  ABDOMEN: soft, nontender, no hepatosplenomegaly, no masses and bowel sounds " "normal   (male): normal male genitalia without lesions or urethral discharge, no hernia  MS: no gross musculoskeletal defects noted, no edema  SKIN: no suspicious lesions or rashes-erythematous dry macular rash in groin.  NEURO: Normal strength and tone, mentation intact and speech normal  PSYCH: mentation appears normal, affect normal/bright    Diagnostic Test Results:  Labs reviewed in Epic    ASSESSMENT/PLAN:       ICD-10-CM    1. Routine general medical examination at a health care facility  Z00.00 Lipid panel reflex to direct LDL Fasting     Basic metabolic panel  (Ca, Cl, CO2, Creat, Gluc, K, Na, BUN)   2. Ulcerative colitis with complication, unspecified location (H)  K51.919    3. Long-term (current) use of anticoagulants [Z79.01]  Z79.01    4. Deep vein thrombosis (DVT) of left lower extremity, unspecified chronicity, unspecified vein (H)  I82.402    5. Gouty arthritis of toe of right foot  M10.9    6. Jock itch  B35.6    7. Exposure to COVID-19 virus  Z20.822 COVID-19 [facility] RBD [facility] [employer] Reflex   medical conditions are stable.  6. Ok for over the counter lotrimin crm twice a day for 4-6 wks. warning signs discussed. Follow up  As needed   7.Covid testing done at patient's request.    Patient has been advised of split billing requirements and indicates understanding: Yes  COUNSELING:   Reviewed preventive health counseling, as reflected in patient instructions       Regular exercise       Healthy diet/nutrition       Vision screening    Estimated body mass index is 33.12 kg/m  as calculated from the following:    Height as of this encounter: 1.854 m (6' 1\").    Weight as of this encounter: 113.9 kg (251 lb).     Weight management plan: Discussed healthy diet and exercise guidelines    He reports that he has never smoked. He has never used smokeless tobacco.      Counseling Resources:  ATP IV Guidelines  Pooled Cohorts Equation Calculator  FRAX Risk Assessment  ICSI Preventive Guidelines  Dietary " Guidelines for Americans, 2010  USDA's MyPlate  ASA Prophylaxis  Lung CA Screening    [facility]

## 2021-08-12 PROBLEM — E78.00 HIGH CHOLESTEROL: Status: ACTIVE | Noted: 2021-08-12

## 2021-08-12 LAB
SARS-COV-2 AB SERPL IA-ACNC: <0.4 U/ML
SARS-COV-2 AB SERPL QL IA: NEGATIVE

## 2021-08-12 RX ORDER — ATORVASTATIN CALCIUM 40 MG/1
40 TABLET, FILM COATED ORAL DAILY
Qty: 90 TABLET | Refills: 1 | Status: SHIPPED | OUTPATIENT
Start: 2021-08-12 | End: 2021-09-23

## 2021-08-16 ENCOUNTER — MYC MEDICAL ADVICE (OUTPATIENT)
Dept: FAMILY MEDICINE | Facility: CLINIC | Age: 51
End: 2021-08-16

## 2021-08-18 DIAGNOSIS — I82.402 DEEP VEIN THROMBOSIS (DVT) OF LEFT LOWER EXTREMITY, UNSPECIFIED CHRONICITY, UNSPECIFIED VEIN (H): ICD-10-CM

## 2021-08-18 RX ORDER — WARFARIN SODIUM 5 MG/1
TABLET ORAL
Qty: 156 TABLET | Refills: 1 | Status: SHIPPED | OUTPATIENT
Start: 2021-08-18 | End: 2022-02-17

## 2021-08-26 ENCOUNTER — DOCUMENTATION ONLY (OUTPATIENT)
Dept: FAMILY MEDICINE | Facility: CLINIC | Age: 51
End: 2021-08-26

## 2021-08-26 NOTE — PROGRESS NOTES
ANTICOAGULATION     Donato De Leon is overdue for INR check.      Mychart sent    Kia Domingo RN

## 2021-08-27 ENCOUNTER — ANTICOAGULATION THERAPY VISIT (OUTPATIENT)
Dept: FAMILY MEDICINE | Facility: CLINIC | Age: 51
End: 2021-08-27

## 2021-08-27 ENCOUNTER — TRANSFERRED RECORDS (OUTPATIENT)
Dept: HEALTH INFORMATION MANAGEMENT | Facility: CLINIC | Age: 51
End: 2021-08-27

## 2021-08-27 DIAGNOSIS — Z79.01 LONG TERM CURRENT USE OF ANTICOAGULANT THERAPY: Primary | ICD-10-CM

## 2021-08-27 DIAGNOSIS — K51.919 ULCERATIVE COLITIS WITH COMPLICATION, UNSPECIFIED LOCATION (H): ICD-10-CM

## 2021-08-27 DIAGNOSIS — I82.402 DEEP VEIN THROMBOSIS (DVT) OF LEFT LOWER EXTREMITY, UNSPECIFIED CHRONICITY, UNSPECIFIED VEIN (H): ICD-10-CM

## 2021-08-27 LAB — INR (EXTERNAL): 4.1 (ref 0.9–1.1)

## 2021-08-27 NOTE — PROGRESS NOTES
A  ANTICOAGULATION MANAGEMENT     Donato EUGENE De Leon 51 year old male is on warfarin with supratherapeutic INR result. (Goal INR 2.0-3.0)    Recent labs: (last 7 days)     08/27/21  0830   INR 4.1*       ASSESSMENT     Source(s): Chart Review and Patient/Caregiver Call       Warfarin doses taken: Warfarin taken as instructed    Diet: No new diet changes identified    New illness, injury, or hospitalization: No    Medication/supplement changes: Lipitor started     Signs or symptoms of bleeding or clotting: No    Previous INR: Therapeutic last 2(+) visits    Additional findings: None     PLAN     Recommended plan for no diet, medication or health factor changes affecting INR     Dosing Instructions: Continue your current warfarin dose with next INR in 1 week       Summary  As of 8/27/2021    Full warfarin instructions:  8/27: Hold; Otherwise 5 mg every Mon; 10 mg all other days   Next INR check:  9/3/2021             Telephone call with Donato who verbalizes understanding and agrees to plan    Patient to recheck with home meter    Education provided: Please call back if any changes to your diet, medications or how you've been taking warfarin    Plan made per [facility] anticoagulation protocol    Kia Domingo RN  Anticoagulation Clinic  8/27/2021    _______________________________________________________________________     Anticoagulation Episode Summary     Current INR goal:  2.0-3.0   TTR:  70.8 % (1 y)   Target end date:  Indefinite   Send INR reminders to:  GISEL CANCHOLA    Indications    Deep vein thrombosis (DVT) (H) [I82.409] (Resolved) [I82.409]  Long-term (current) use of anticoagulants [Z79.01] [Z79.01]  Deep vein thrombosis (DVT) of left lower extremity  unspecified chronicity  unspecified vein (H) [I82.402]  Ulcerative colitis with complication  unspecified location (H) [K51.919]  Acute deep vein thrombosis (DVT) of right lower extremity  unspecified vein (H) (Resolved) [I82.401]           Comments:  [facility]  Home INR Monitoring patient         Anticoagulation Care Providers     Provider Role Specialty Phone number    Carter Skinner PA-C Referring Family Medicine [phone]        nticoagulation Management    Unable to reach Jerry today.    Today's INR result of 4.1 is supratherapeutic (goal INR of 2.0-3.0).  Result received from: Home Monitor    Follow up required to confirm warfarin dose taken and assess for changes    LMTCB      Anticoagulation clinic to follow up    Kia Domingo RN  Incoming fax from [email or web address] home monitoring company    Date of INR 8/27    INR result 4.1

## 2021-09-10 ENCOUNTER — ANTICOAGULATION THERAPY VISIT (OUTPATIENT)
Dept: FAMILY MEDICINE | Facility: CLINIC | Age: 51
End: 2021-09-10

## 2021-09-10 ENCOUNTER — TRANSFERRED RECORDS (OUTPATIENT)
Dept: HEALTH INFORMATION MANAGEMENT | Facility: CLINIC | Age: 51
End: 2021-09-10

## 2021-09-10 DIAGNOSIS — Z79.01 LONG TERM CURRENT USE OF ANTICOAGULANT THERAPY: Primary | ICD-10-CM

## 2021-09-10 DIAGNOSIS — I82.402 DEEP VEIN THROMBOSIS (DVT) OF LEFT LOWER EXTREMITY, UNSPECIFIED CHRONICITY, UNSPECIFIED VEIN (H): ICD-10-CM

## 2021-09-10 DIAGNOSIS — K51.919 ULCERATIVE COLITIS WITH COMPLICATION, UNSPECIFIED LOCATION (H): ICD-10-CM

## 2021-09-10 LAB — INR (EXTERNAL): 2.4 (ref 0.9–1.1)

## 2021-09-10 NOTE — PROGRESS NOTES
ANTICOAGULATION  MANAGEMENT-Patient Home Monitoring Result    Assessment     Therapeutic INR result of 2.4 . Goal range 2.0-3.0. Received via fax from [employer] home INR monitoring company.        Previous INR was therapeutic    Donato was last contacted by phone: Left detailed message on 9/10 due to out of range INR last check    Plan     Per home monitoring agreement with patient, patient was NOT contacted regarding therapeutic result today.  Patient is to continue current dose and continue to check INR with home monitor per protocol.  ?       OBJECTIVE    INR (External)   Date Value Ref Range Status   09/10/2021 2.4 (A) 0.9 - 1.1 Final       ASSESSMENT / PLAN  No question data found.  Anticoagulation Summary  As of 9/10/2021    INR goal:  2.0-3.0   TTR:  68.3 % (1 y)   INR used for dosin.4 (9/10/2021)   Warfarin maintenance plan:  5 mg (5 mg x 1) every Mon; 10 mg (5 mg x 2) all other days   Full warfarin instructions:  5 mg every Mon; 10 mg all other days   Weekly warfarin total:  65 mg   No change documented:  Kia Domingo RN   Plan last modified:  Eliza Donovan RN (2020)   Next INR check:     Priority:  Maintenance   Target end date:  Indefinite    Indications    Deep vein thrombosis (DVT) (H) [I82.409] (Resolved) [I82.409]  Long-term (current) use of anticoagulants [Z79.01] [Z79.01]  Deep vein thrombosis (DVT) of left lower extremity  unspecified chronicity  unspecified vein (H) [I82.402]  Ulcerative colitis with complication  unspecified location (H) [K51.919]  Acute deep vein thrombosis (DVT) of right lower extremity  unspecified vein (H) (Resolved) [I82.401]             Anticoagulation Episode Summary     INR check location:      Preferred lab:  EXTERNAL LAB    Send INR reminders to:  GISEL CANCHOLA    Comments:  [employer] [facility] Monitoring patient      Anticoagulation Care Providers     Provider Role Specialty Phone number    Carter Skinner PA-C Referring Family Medicine [phone]         Incoming fax from [employer] home monitoring company    Date of INR 9/10    INR result 2.4

## 2021-09-23 DIAGNOSIS — E78.00 HIGH CHOLESTEROL: ICD-10-CM

## 2021-09-23 RX ORDER — ATORVASTATIN CALCIUM 40 MG/1
40 TABLET, FILM COATED ORAL DAILY
Qty: 90 TABLET | Refills: 1 | Status: SHIPPED | OUTPATIENT
Start: 2021-09-23 | End: 2022-02-28

## 2021-09-23 NOTE — TELEPHONE ENCOUNTER
The patient is calling for a refill for atorvastatin (LIPITOR) 40 MG tablet.  He wants this sent to a new pharmacy, [facility].  He is out of medication.  Thank you.  Corazon Blank,  [facility]

## 2021-09-29 DIAGNOSIS — I82.402 DEEP VEIN THROMBOSIS (DVT) OF LEFT LOWER EXTREMITY, UNSPECIFIED CHRONICITY, UNSPECIFIED VEIN (H): Primary | ICD-10-CM

## 2021-10-01 ENCOUNTER — TELEPHONE (OUTPATIENT)
Dept: FAMILY MEDICINE | Facility: CLINIC | Age: 51
End: 2021-10-01
Payer: COMMERCIAL

## 2021-10-01 NOTE — TELEPHONE ENCOUNTER
ANTICOAGULATION     Donato De Leon is overdue for INR check.      Spoke with Jerry instructed to test INR with home meter and call results to home monitoring company as soon as possible.    Tahira Frye RN

## 2021-10-04 ENCOUNTER — TRANSFERRED RECORDS (OUTPATIENT)
Dept: HEALTH INFORMATION MANAGEMENT | Facility: CLINIC | Age: 51
End: 2021-10-04

## 2021-10-04 ENCOUNTER — ANTICOAGULATION THERAPY VISIT (OUTPATIENT)
Dept: FAMILY MEDICINE | Facility: CLINIC | Age: 51
End: 2021-10-04

## 2021-10-04 DIAGNOSIS — Z79.01 LONG TERM CURRENT USE OF ANTICOAGULANT THERAPY: Primary | ICD-10-CM

## 2021-10-04 DIAGNOSIS — K51.919 ULCERATIVE COLITIS WITH COMPLICATION, UNSPECIFIED LOCATION (H): ICD-10-CM

## 2021-10-04 DIAGNOSIS — I82.402 DEEP VEIN THROMBOSIS (DVT) OF LEFT LOWER EXTREMITY, UNSPECIFIED CHRONICITY, UNSPECIFIED VEIN (H): ICD-10-CM

## 2021-10-04 LAB — INR (EXTERNAL): 2.7 (ref 2–3)

## 2021-10-04 NOTE — PROGRESS NOTES
ANTICOAGULATION  MANAGEMENT-Home Monitor Managed by Exception    Donato EUGENE De Leon 51 year old male is on warfarin with therapeutic INR result. (Goal INR 2.0-3.0)    Recent labs: (last 7 days)     10/04/21  0903   INR 2.7         Previous INR was Therapeutic    Medication, diet, health changes since last INR:chart reviewed; none idientified    Contacted within the last 12 weeks by phone on 9/10      JULEE Sewell was NOT contacted regarding therapeutic result today per home monitoring policy manage by exception agreement.   Current warfarin dose is to be continued:     Summary  As of 10/4/2021    Full warfarin instructions:  5 mg every Mon; 10 mg all other days   Next INR check:             ?   April Alvarado RN  Anticoagulation Clinic  10/4/2021    _______________________________________________________________________     Anticoagulation Episode Summary     Current INR goal:  2.0-3.0   TTR:  72.4 % (1 y)   Target end date:  Indefinite   Send INR reminders to:  [facility]    Indications    Deep vein thrombosis (DVT) (H) [I82.409] (Resolved) [I82.409]  Long-term (current) use of anticoagulants [Z79.01] [Z79.01]  Deep vein thrombosis (DVT) of left lower extremity  unspecified chronicity  unspecified vein (H) [I82.402]  Ulcerative colitis with complication  unspecified location (H) [K51.919]  Acute deep vein thrombosis (DVT) of right lower extremity  unspecified vein (H) (Resolved) [I82.401]           Comments:  [employer] [facility] INR Monitoring patient         Anticoagulation Care Providers     Provider Role Specialty Phone number    Brody, Carter Romo PA-C Referring Family Medicine [phone]          April Campa, RN, BSN, PHN  Anticoagulation Nurse  [phone]

## 2021-10-07 ENCOUNTER — TELEPHONE (OUTPATIENT)
Dept: FAMILY MEDICINE | Facility: CLINIC | Age: 51
End: 2021-10-07
Payer: COMMERCIAL

## 2021-10-07 DIAGNOSIS — Z79.01 LONG TERM CURRENT USE OF ANTICOAGULANT THERAPY: Primary | ICD-10-CM

## 2021-10-07 DIAGNOSIS — I82.402 DEEP VEIN THROMBOSIS (DVT) OF LEFT LOWER EXTREMITY, UNSPECIFIED CHRONICITY, UNSPECIFIED VEIN (H): ICD-10-CM

## 2021-10-07 DIAGNOSIS — K51.919 ULCERATIVE COLITIS WITH COMPLICATION, UNSPECIFIED LOCATION (H): ICD-10-CM

## 2021-10-07 NOTE — TELEPHONE ENCOUNTER
RAVINDER called and left a message requested a 4 day hold and bridging orders for a colonoscopy that is scheduled on 11/4/2021.      Chanelle Molina RN    [facility] Anticoagulation [facility]

## 2021-10-08 ENCOUNTER — TRANSFERRED RECORDS (OUTPATIENT)
Dept: HEALTH INFORMATION MANAGEMENT | Facility: CLINIC | Age: 51
End: 2021-10-08

## 2021-10-12 NOTE — TELEPHONE ENCOUNTER
Spoke with Clarissa at [facility], orders give for 4 day warfarin hold.      My Chart sent 10/12/2021 with preliminary instructions.  Next INR check 10/18/2021     Stormy Steward, PharmD BCACP  Anticoagulation Clinical Pharmacist

## 2021-10-12 NOTE — TELEPHONE ENCOUNTER
"[facility] ANTICOAGULATION  MANAGEMENT    ASSESSMENT     Warfarin interruption plan for colonoscopy on 11/04/2021.    Indication for Anticoagulation: DVT      Risk stratification for thromboembolism: low (2018 American Society of Hematology guideline)    o DVT 03/2017 provoked from ulcerative colitis flare & steroid TX  o No hematology testing to assess any other independent risk factors    VTE: 2018 American Society of Hematology recommends against bridging in low and moderate risk VTE patients holding warfarin    VTE: 2016 Anticoagulation Forum clinical guidance recommends no bridge therapy for most VTE patients interrupting warfarin with possible exceptions for VTE within previous month, prior hx recurrent VTE during anticoagulation therapy interruption, or undergoing a procedure with high for VTE  (joint replacement surgery or major abdominal cancer resection)     RECOMMENDATION       Pre-Procedure:  o Hold warfarin for 4 days, until after procedure starting: 10/31/2021   o No Bridge      Post-Procedure:  o Resume warfarin dose if okay with provider doing procedure on night of procedure, 11/04/2021 PM: 20mg & 11/05/2021 20mg  o Recheck INR ~ 7 days after resuming warfarin         Plan routed to referring provider for approval  ?   Stormy Steward [facility]    SUBJECTIVE/OBJECTIVE     Donato De Leon, a 51 year old male    Goal INR Range: 2.0-3.0     Patient bridged in past: Yes: 2020 colonoscopy based on general/generic plan provided by [facility]     Pertinent History: N/A    Wt Readings from Last 3 Encounters:   08/11/21 113.9 kg (251 lb)   08/02/21 118.7 kg (261 lb 9.6 oz)   10/10/18 114.3 kg (252 lb)      Ideal body weight: 79.9 kg (176 lb 2.4 oz)  Adjusted ideal body weight: 93.5 kg (206 lb 1.4 oz)     Estimated body mass index is 33.12 kg/m  as calculated from the following:    Height as of 8/11/21: 1.854 m (6' 1\").    Weight as of 8/11/21: 113.9 kg (251 lb).    Lab Results   Component Value Date    INR 2.7 " 10/04/2021    INR 2.4 (A) 09/10/2021    INR 4.1 (A) 08/27/2021     Lab Results   Component Value Date    HGB 15.8 05/03/2017    HCT 46.9 05/03/2017     05/03/2017     Lab Results   Component Value Date    CR 1.39 (H) 08/11/2021    CR 1.06 01/24/2020    CR 1.13 12/16/2019     CrCl cannot be calculated (Patient's most recent lab result is older than the maximum 30 days allowed.).

## 2021-10-25 ENCOUNTER — DOCUMENTATION ONLY (OUTPATIENT)
Dept: FAMILY MEDICINE | Facility: CLINIC | Age: 51
End: 2021-10-25

## 2021-11-03 ENCOUNTER — DOCUMENTATION ONLY (OUTPATIENT)
Dept: FAMILY MEDICINE | Facility: CLINIC | Age: 51
End: 2021-11-03

## 2021-11-04 ENCOUNTER — TRANSFERRED RECORDS (OUTPATIENT)
Dept: HEALTH INFORMATION MANAGEMENT | Facility: CLINIC | Age: 51
End: 2021-11-04
Payer: COMMERCIAL

## 2021-11-09 ENCOUNTER — ANTICOAGULATION THERAPY VISIT (OUTPATIENT)
Dept: FAMILY MEDICINE | Facility: CLINIC | Age: 51
End: 2021-11-09
Payer: COMMERCIAL

## 2021-11-09 ENCOUNTER — TRANSFERRED RECORDS (OUTPATIENT)
Dept: HEALTH INFORMATION MANAGEMENT | Facility: CLINIC | Age: 51
End: 2021-11-09
Payer: COMMERCIAL

## 2021-11-09 DIAGNOSIS — Z79.01 LONG TERM CURRENT USE OF ANTICOAGULANT THERAPY: Primary | ICD-10-CM

## 2021-11-09 DIAGNOSIS — I82.402 DEEP VEIN THROMBOSIS (DVT) OF LEFT LOWER EXTREMITY, UNSPECIFIED CHRONICITY, UNSPECIFIED VEIN (H): ICD-10-CM

## 2021-11-09 DIAGNOSIS — K51.919 ULCERATIVE COLITIS WITH COMPLICATION, UNSPECIFIED LOCATION (H): ICD-10-CM

## 2021-11-09 LAB — INR (EXTERNAL): 1.3 (ref 0.9–1.1)

## 2021-11-09 NOTE — PROGRESS NOTES
ANTICOAGULATION MANAGEMENT     Donato De Leon 51 year old male is on warfarin with subtherapeutic INR result. (Goal INR 2.0-3.0)    Recent labs: (last 7 days)     11/09/21  0800   INR 1.3*       ASSESSMENT     Source(s): Chart Review and Patient/Caregiver Call       Warfarin doses taken: Warfarin recently held for colonoscopy on 11/4/21 which may be affecting INR    Diet: No new diet changes identified    New illness, injury, or hospitalization: No    Medication/supplement changes: None noted    Signs or symptoms of bleeding or clotting: No    Previous INR: Therapeutic last 2(+) visits    Additional findings: Patient did not read my chart messages regarding warfarin re-start after colonoscopy and just followed his maintenance dose     PLAN     Recommended plan for temporary change(s) affecting INR     Dosing Instructions: Booster dose then continue your current warfarin dose with next INR in 1 week       Summary  As of 11/9/2021    Full warfarin instructions:  11/9: 20 mg; Otherwise 5 mg every Mon; 10 mg all other days   Next INR check:  11/16/2021             Telephone call with Donato who verbalizes understanding and agrees to plan    Patient to recheck with home meter    Education provided: Goal range and significance of current result, Importance of therapeutic range, Importance of following up at instructed interval and Importance of taking warfarin as instructed    Plan made per [facility] anticoagulation protocol    Maggie Varghese, RN  Anticoagulation Clinic  11/9/2021    _______________________________________________________________________     Anticoagulation Episode Summary     Current INR goal:  2.0-3.0   TTR:  77.3 % (1 y)   Target end date:  Indefinite   Send INR reminders to:  GISEL CANCHOLA    Indications    Deep vein thrombosis (DVT) (H) [I82.409] (Resolved) [I82.409]  Long-term (current) use of anticoagulants [Z79.01] [Z79.01]  Deep vein thrombosis (DVT) of left lower extremity  unspecified  chronicity  unspecified vein (H) [I82.402]  Ulcerative colitis with complication  unspecified location (H) [K51.919]  Acute deep vein thrombosis (DVT) of right lower extremity  unspecified vein (H) (Resolved) [I82.401]           Comments:  [employer] [facility]R Monitoring patient         Anticoagulation Care Providers     Provider Role Specialty Phone number    Carter Skinner PA-C Referring [facility] Medicine [phone]

## 2021-11-09 NOTE — PROGRESS NOTES
Incoming fax from [employer] home monitoring company    Date of result 11/9    INR result 1.3

## 2021-11-17 ENCOUNTER — DOCUMENTATION ONLY (OUTPATIENT)
Dept: FAMILY MEDICINE | Facility: CLINIC | Age: 51
End: 2021-11-17
Payer: COMMERCIAL

## 2021-11-18 ENCOUNTER — TRANSFERRED RECORDS (OUTPATIENT)
Dept: HEALTH INFORMATION MANAGEMENT | Facility: CLINIC | Age: 51
End: 2021-11-18
Payer: COMMERCIAL

## 2021-11-18 ENCOUNTER — ANTICOAGULATION THERAPY VISIT (OUTPATIENT)
Dept: FAMILY MEDICINE | Facility: CLINIC | Age: 51
End: 2021-11-18
Payer: COMMERCIAL

## 2021-11-18 DIAGNOSIS — I82.402 DEEP VEIN THROMBOSIS (DVT) OF LEFT LOWER EXTREMITY, UNSPECIFIED CHRONICITY, UNSPECIFIED VEIN (H): ICD-10-CM

## 2021-11-18 DIAGNOSIS — Z79.01 LONG TERM CURRENT USE OF ANTICOAGULANT THERAPY: Primary | ICD-10-CM

## 2021-11-18 DIAGNOSIS — K51.919 ULCERATIVE COLITIS WITH COMPLICATION, UNSPECIFIED LOCATION (H): ICD-10-CM

## 2021-11-18 LAB — INR (EXTERNAL): 2.3 (ref 0.9–1.1)

## 2021-11-18 NOTE — PROGRESS NOTES
ANTICOAGULATION MANAGEMENT     Donato De Leon 51 year old male is on warfarin with therapeutic INR result. (Goal INR 2.0-3.0)    Recent labs: (last 7 days)     11/18/21  0918   INR 2.3*       ASSESSMENT     Source(s): Chart Review and Patient/Caregiver Call       Warfarin doses taken: Warfarin recently held for Colonoscopy which may be affecting INR    Diet: No new diet changes identified    New illness, injury, or hospitalization: No    Medication/supplement changes: None noted    Signs or symptoms of bleeding or clotting: No    Previous INR: Subtherapeutic    Additional findings: None     PLAN     Recommended plan for no diet, medication or health factor changes affecting INR     Dosing Instructions: Continue your current warfarin dose with next INR in 2 weeks       Summary  As of 11/18/2021    Full warfarin instructions:  5 mg every Mon; 10 mg all other days   Next INR check:               Detailed voice message left for Donato with dosing instructions and follow up date.     Patient to recheck with home meter    Education provided: Please call back if any changes to your diet, medications or how you've been taking warfarin    Plan made per [facility] anticoagulation protocol    Eliza Donovan, RN  Anticoagulation Clinic  11/18/2021    _______________________________________________________________________     Anticoagulation Episode Summary     Current INR goal:  2.0-3.0   TTR:  76.1 % (1 y)   Target end date:  Indefinite   Send INR reminders to:  [facility]    Indications    Deep vein thrombosis (DVT) (H) [I82.409] (Resolved) [I82.409]  Long-term (current) use of anticoagulants [Z79.01] [Z79.01]  Deep vein thrombosis (DVT) of left lower extremity  unspecified chronicity  unspecified vein (H) [I82.402]  Ulcerative colitis with complication  unspecified location (H) [K51.919]  Acute deep vein thrombosis (DVT) of right lower extremity  unspecified vein (H) (Resolved) [I82.401]           Comments:  [employer] [facility]R  Monitoring patient         Anticoagulation Care Providers     Provider Role Specialty Phone number    Carter Skinner PA-C Referring Family Medicine [phone]

## 2021-12-02 ENCOUNTER — TRANSFERRED RECORDS (OUTPATIENT)
Dept: HEALTH INFORMATION MANAGEMENT | Facility: CLINIC | Age: 51
End: 2021-12-02
Payer: COMMERCIAL

## 2021-12-10 ENCOUNTER — ANTICOAGULATION THERAPY VISIT (OUTPATIENT)
Dept: FAMILY MEDICINE | Facility: CLINIC | Age: 51
End: 2021-12-10
Payer: COMMERCIAL

## 2021-12-10 ENCOUNTER — TRANSFERRED RECORDS (OUTPATIENT)
Dept: HEALTH INFORMATION MANAGEMENT | Facility: CLINIC | Age: 51
End: 2021-12-10
Payer: COMMERCIAL

## 2021-12-10 DIAGNOSIS — Z79.01 LONG TERM CURRENT USE OF ANTICOAGULANT THERAPY: Primary | ICD-10-CM

## 2021-12-10 DIAGNOSIS — K51.919 ULCERATIVE COLITIS WITH COMPLICATION, UNSPECIFIED LOCATION (H): ICD-10-CM

## 2021-12-10 DIAGNOSIS — I82.402 DEEP VEIN THROMBOSIS (DVT) OF LEFT LOWER EXTREMITY, UNSPECIFIED CHRONICITY, UNSPECIFIED VEIN (H): ICD-10-CM

## 2021-12-10 LAB — INR (EXTERNAL): 2.5 (ref 0.9–1.1)

## 2021-12-10 NOTE — PROGRESS NOTES
ANTICOAGULATION  MANAGEMENT-Home Monitor Managed by Exception    Donato EUGENE De Leon 51 year old male is on warfarin with therapeutic INR result. (Goal INR 2.0-3.0)    Recent labs: (last 7 days)     12/10/21  0951   INR 2.5*         Previous INR was Therapeutic    Medication, diet, health changes since last INR:chart reviewed; none identified    Contacted within the last 12 weeks by phone on 11/18      JULEE Sewell was NOT contacted regarding therapeutic result today per home monitoring policy manage by exception agreement.   Current warfarin dose is to be continued:     Summary  As of 12/10/2021    Full warfarin instructions:  5 mg every Mon; 10 mg all other days   Next INR check:             ?   April Alvarado RN  Anticoagulation Clinic  12/10/2021    _______________________________________________________________________     Anticoagulation Episode Summary     Current INR goal:  2.0-3.0   TTR:  76.1 % (1 y)   Target end date:  Indefinite   Send INR reminders to:  [facility]    Indications    Deep vein thrombosis (DVT) (H) [I82.409] (Resolved) [I82.409]  Long-term (current) use of anticoagulants [Z79.01] [Z79.01]  Deep vein thrombosis (DVT) of left lower extremity  unspecified chronicity  unspecified vein (H) [I82.402]  Ulcerative colitis with complication  unspecified location (H) [K51.919]  Acute deep vein thrombosis (DVT) of right lower extremity  unspecified vein (H) (Resolved) [I82.401]           Comments:  [employer] [facility] INR Monitoring patient         Anticoagulation Care Providers     Provider Role Specialty Phone number    Brody, Carter Romo PA-C Referring Family Medicine [phone]          April Campa, RN, BSN, PHN  Anticoagulation Nurse  [phone]

## 2021-12-12 ENCOUNTER — NURSE TRIAGE (OUTPATIENT)
Dept: NURSING | Facility: CLINIC | Age: 51
End: 2021-12-12
Payer: COMMERCIAL

## 2021-12-12 NOTE — TELEPHONE ENCOUNTER
Patient calling with concerns about:      Dizzy/lightheaded X3 days - constantly- feels like head is foggy/feels unsteady when walking  Heaviness in chest only when laying down- so he has been sitting up in a chair to sleep.  States that his wife tells him that 'his color is off - pale.'  Feels like his heart is beating fast or that something feels 'different about it.'    Patient Denies:  Difficulty breathing  Cough/cold symptoms  Fever  Nausea/vomiting  Numbness  Weakness on one side of body    According to the protocol, patient should go to ED now.  Care advice given. Patient verbalizes understanding and agrees with plan of care.    Lizbeth Camp RN, Nurse Advisor 3:37 PM 12/12/2021    COVID 19 Nurse Triage Plan/Patient Instructions    Please be aware that novel coronavirus (COVID-19) may be circulating in the community. If you develop symptoms such as fever, cough, or SOB or if you have concerns about the presence of another infection including coronavirus (COVID-19), please contact your health care provider or visit [email or web address].     Disposition/Instructions    ED Visit recommended. Follow protocol based instructions.     Bring Your Own Device:  Please also bring your smart device(s) (smart phones, tablets, laptops) and their charging cables for your personal use and to communicate with your care team during your visit.    Thank you for taking steps to prevent the spread of this virus.  o Limit your contact with others.  o Wear a simple mask to cover your cough.  o Wash your hands well and often.    Resources    [facility]: About COVID-19: [email or web address]    CDC: What to Do If You're Sick: www.cdc.gov/coronavirus/2019-ncov/about/steps-when-sick.html    CDC: Ending Home Isolation: www.cdc.gov/coronavirus/2019-ncov/hcp/disposition-in-home-patients.html     CDC: Caring for Someone: www.cdc.gov/coronavirus/2019-ncov/if-you-are-sick/care-for-someone.html     [facility]: Interim  "Guidance for Hospital Discharge to Home: www.health.[facility].[place]/diseases/coronavirus/hcp/hospdischarge.pdf    [facility] clinical trials (COVID-19 research studies): [email or web address]-trials     Below are the COVID-19 hotlines at the [place] Department of Health ([facility]). Interpreters are available.   o For health questions: Call 036-723-1786 or 1-690.682.5625 (7 a.m. to 7 p.m.)  o For questions about schools and childcare: Call 428-205-3792 or 1-987.740.9598 (7 a.m. to 7 p.m.)       Reason for Disposition    Extra heart beats OR irregular heart beating  (i.e., \"palpitations\")    Additional Information    Negative: Severe difficulty breathing (e.g., struggling for each breath, speaks in single words)    Negative: [1] Difficulty breathing or swallowing AND [2] started suddenly after medicine, an allergic food or bee sting    Negative: Shock suspected (e.g., cold/pale/clammy skin, too weak to stand, low BP, rapid pulse)    Negative: Difficulty breathing    Negative: SEVERE dizziness (e.g., unable to stand, requires support to walk, feels like passing out now)    Negative: Chest pain    Negative: Rectal bleeding, bloody stool, or tarry-black stool    Negative: [1] Vomiting AND [2] contains red blood or black (\"coffee ground\") material    Negative: Vomiting is main symptom    Negative: Diarrhea is main symptom    Negative: Headache is main symptom    Negative: Patient states that he/she is having an anxiety/panic attack    Negative: Dizziness from low blood sugar (i.e., < 60 mg/dl or 3.5 mmol/l)    Negative: Dizziness is described as a spinning sensation (i.e., vertigo)    Negative: Heat exhaustion suspected (i.e., dehydration from heat exposure)    Negative: Difficult to awaken or acting confused (e.g., disoriented, slurred speech)    Negative: [1] Weakness (i.e., paralysis, loss of muscle strength) of the face, arm or leg on one side of the body AND [2] sudden onset AND [3] present " now    Negative: [1] Numbness (i.e., loss of sensation) of the face, arm or leg on one side of the body AND [2] sudden onset AND [3] present now    Negative: [1] Loss of speech or garbled speech AND [2] sudden onset AND [3] present now    Negative: Overdose (accidental or intentional) of medications    Negative: [1] Fainted > 15 minutes ago AND [2] still feels too weak or dizzy to stand    Negative: Heart beating < 50 beats per minute OR > 140 beats per minute    Negative: Sounds like a life-threatening emergency to the triager    Protocols used: DIZZINESS - [id number]

## 2021-12-29 ENCOUNTER — DOCUMENTATION ONLY (OUTPATIENT)
Dept: FAMILY MEDICINE | Facility: CLINIC | Age: 51
End: 2021-12-29
Payer: COMMERCIAL

## 2022-01-02 ENCOUNTER — TRANSFERRED RECORDS (OUTPATIENT)
Dept: HEALTH INFORMATION MANAGEMENT | Facility: CLINIC | Age: 52
End: 2022-01-02
Payer: COMMERCIAL

## 2022-01-02 LAB — INR HOME MONITORING: 2.6 (ref 2–3)

## 2022-01-03 ENCOUNTER — ANTICOAGULATION THERAPY VISIT (OUTPATIENT)
Dept: ANTICOAGULATION | Facility: CLINIC | Age: 52
End: 2022-01-03
Payer: COMMERCIAL

## 2022-01-03 DIAGNOSIS — I82.402 DEEP VEIN THROMBOSIS (DVT) OF LEFT LOWER EXTREMITY, UNSPECIFIED CHRONICITY, UNSPECIFIED VEIN (H): ICD-10-CM

## 2022-01-03 DIAGNOSIS — K51.919 ULCERATIVE COLITIS WITH COMPLICATION, UNSPECIFIED LOCATION (H): ICD-10-CM

## 2022-01-03 DIAGNOSIS — Z79.01 LONG TERM CURRENT USE OF ANTICOAGULANT THERAPY: Primary | ICD-10-CM

## 2022-01-03 NOTE — PROGRESS NOTES
ANTICOAGULATION  MANAGEMENT-Home Monitor Managed by Exception    oDnato EUGENE De Leon 51 year old male is on warfarin with therapeutic INR result. (Goal INR 2.0-3.0)    Recent labs: (last 7 days)     01/02/22  0000   INR 2.60         Previous INR was Therapeutic    Medication, diet, health changes since last INR:chart reviewed; none identified    Contacted within the last 12 weeks by phone on 11/18/2021      JULEE Sewell was NOT contacted regarding therapeutic result today per home monitoring policy manage by exception agreement.   Current warfarin dose is to be continued:     Summary  As of 1/3/2022    Full warfarin instructions:  5 mg every Mon; 10 mg all other days   Next INR check:  1/17/2022           ?   Dotty Howell RN  Anticoagulation Clinic  1/3/2022    _______________________________________________________________________     Anticoagulation Episode Summary     Current INR goal:  2.0-3.0   TTR:  76.0 % (1 y)   Target end date:  Indefinite   Send INR reminders to:  GISEL CANCHOLA    Indications    Deep vein thrombosis (DVT) (H) [I82.409] (Resolved) [I82.409]  Long-term (current) use of anticoagulants [Z79.01] [Z79.01]  Deep vein thrombosis (DVT) of left lower extremity  unspecified chronicity  unspecified vein (H) [I82.402]  Ulcerative colitis with complication  unspecified location (H) [K51.919]  Acute deep vein thrombosis (DVT) of right lower extremity  unspecified vein (H) (Resolved) [I82.401]           Comments:  [facility]R Monitoring patient         Anticoagulation Care Providers     Provider Role Specialty Phone number    Carter Skinner PA-C Referring Family Medicine [phone]

## 2022-01-17 ENCOUNTER — TRANSFERRED RECORDS (OUTPATIENT)
Dept: HEALTH INFORMATION MANAGEMENT | Facility: CLINIC | Age: 52
End: 2022-01-17
Payer: COMMERCIAL

## 2022-01-17 LAB
ALT SERPL-CCNC: 103 IU/L (ref 0–44)
AST SERPL-CCNC: 51 IU/L (ref 0–40)

## 2022-01-18 ENCOUNTER — MEDICAL CORRESPONDENCE (OUTPATIENT)
Dept: HEALTH INFORMATION MANAGEMENT | Facility: CLINIC | Age: 52
End: 2022-01-18
Payer: COMMERCIAL

## 2022-01-26 ENCOUNTER — DOCUMENTATION ONLY (OUTPATIENT)
Dept: FAMILY MEDICINE | Facility: CLINIC | Age: 52
End: 2022-01-26
Payer: COMMERCIAL

## 2022-02-02 ENCOUNTER — DOCUMENTATION ONLY (OUTPATIENT)
Dept: FAMILY MEDICINE | Facility: CLINIC | Age: 52
End: 2022-02-02
Payer: COMMERCIAL

## 2022-02-06 LAB — INR HOME MONITORING: 2.8 (ref 2–3)

## 2022-02-07 ENCOUNTER — DOCUMENTATION ONLY (OUTPATIENT)
Dept: ANTICOAGULATION | Facility: CLINIC | Age: 52
End: 2022-02-07
Payer: COMMERCIAL

## 2022-02-07 DIAGNOSIS — I82.402 DEEP VEIN THROMBOSIS (DVT) OF LEFT LOWER EXTREMITY, UNSPECIFIED CHRONICITY, UNSPECIFIED VEIN (H): ICD-10-CM

## 2022-02-07 DIAGNOSIS — K51.919 ULCERATIVE COLITIS WITH COMPLICATION, UNSPECIFIED LOCATION (H): ICD-10-CM

## 2022-02-07 DIAGNOSIS — Z79.01 LONG TERM CURRENT USE OF ANTICOAGULANT THERAPY: Primary | ICD-10-CM

## 2022-02-07 NOTE — PROGRESS NOTES
ANTICOAGULATION  MANAGEMENT-Home Monitor Managed by Exception    Donato EUGENE De Leon 51 year old male is on warfarin with therapeutic INR result. (Goal INR 2.0-3.0)    Recent labs: (last 7 days)     02/06/22  0000   INR 2.80         Previous INR was Therapeutic    Medication, diet, health changes since last INR:chart reviewed; none identified    Contacted within the last 12 weeks by phone on 11/18/21      JULEE     Donato was NOT contacted regarding therapeutic result today per home monitoring policy manage by exception agreement.   Current warfarin dose is to be continued:     Summary  As of 2/7/2022    Full warfarin instructions:  5 mg every Mon; 10 mg all other days   Next INR check:  2/21/2022           ?   Barrera Jeter RN  Anticoagulation Clinic  2/7/2022    _______________________________________________________________________     Anticoagulation Episode Summary     Current INR goal:  2.0-3.0   TTR:  76.0 % (1 y)   Target end date:  Indefinite   Send INR reminders to:  [facility]    Indications    Deep vein thrombosis (DVT) (H) [I82.409] (Resolved) [I82.409]  Long-term (current) use of anticoagulants [Z79.01] [Z79.01]  Deep vein thrombosis (DVT) of left lower extremity  unspecified chronicity  unspecified vein (H) [I82.402]  Ulcerative colitis with complication  unspecified location (H) [K51.919]  Acute deep vein thrombosis (DVT) of right lower extremity  unspecified vein (H) (Resolved) [I82.401]           Comments:  [facility] INR Monitoring patient         Anticoagulation Care Providers     Provider Role Specialty Phone number    Brody, Carter Romo PA-C Referring Family Medicine [phone]

## 2022-02-28 ENCOUNTER — DOCUMENTATION ONLY (OUTPATIENT)
Dept: FAMILY MEDICINE | Facility: CLINIC | Age: 52
End: 2022-02-28
Payer: COMMERCIAL

## 2022-02-28 ENCOUNTER — ANTICOAGULATION THERAPY VISIT (OUTPATIENT)
Dept: FAMILY MEDICINE | Facility: CLINIC | Age: 52
End: 2022-02-28
Payer: COMMERCIAL

## 2022-02-28 DIAGNOSIS — Z79.01 LONG TERM CURRENT USE OF ANTICOAGULANT THERAPY: Primary | ICD-10-CM

## 2022-02-28 DIAGNOSIS — I82.402 DEEP VEIN THROMBOSIS (DVT) OF LEFT LOWER EXTREMITY, UNSPECIFIED CHRONICITY, UNSPECIFIED VEIN (H): ICD-10-CM

## 2022-02-28 DIAGNOSIS — K51.919 ULCERATIVE COLITIS WITH COMPLICATION, UNSPECIFIED LOCATION (H): ICD-10-CM

## 2022-02-28 LAB — INR HOME MONITORING: 3.1 (ref 2–3)

## 2022-02-28 NOTE — PROGRESS NOTES
3ANTICOAGULATION     Donato De Leon is overdue for INR check.      Mychart sent    Kia Domingo RN

## 2022-02-28 NOTE — PROGRESS NOTES
ANTICOAGULATION MANAGEMENT     Donato De Leon 51 year old male is on warfarin with supratherapeutic INR result. (Goal INR 2.0-3.0)    Recent labs: (last 7 days)     02/28/22  0000   INR 3.10*       ASSESSMENT       Source(s): Chart Review    Previous INR was Therapeutic last 2(+) visits    Medication, diet, health changes since last INR chart reviewed; none identified           PLAN     Unable to reach Jerry  today.    left dosing to take 5 mg today and call back [facility] in the morning.    Follow up required to confirm warfarin dose taken and assess for changes    Kia Domingo RN  Anticoagulation Clinic  2/28/2022

## 2022-03-01 ENCOUNTER — TELEPHONE (OUTPATIENT)
Dept: FAMILY MEDICINE | Facility: CLINIC | Age: 52
End: 2022-03-01
Payer: COMMERCIAL

## 2022-03-01 NOTE — PROGRESS NOTES
ANTICOAGULATION MANAGEMENT     Donato De Leon 51 year old male is on warfarin with therapeutic INR result. (Goal INR 2.0-3.0)    Recent labs: (last 7 days)     02/28/22  0000   INR 3.10*       ASSESSMENT       Source(s): Chart Review and Patient/Caregiver Call       Warfarin doses taken: Warfarin taken as instructed    Diet: No new diet changes identified    New illness, injury, or hospitalization: No    Medication/supplement changes: None noted    Signs or symptoms of bleeding or clotting: No    Previous INR: Therapeutic last 2(+) visits    Additional findings: None       PLAN     Recommended plan for no diet, medication or health factor changes affecting INR     Dosing Instructions: Continue your current warfarin dose with next INR in 2 weeks       Summary  As of 2/28/2022    Full warfarin instructions:  5 mg every Mon; 10 mg all other days   Next INR check:  3/14/2022             Telephone call with Donato who verbalizes understanding and agrees to plan    Patient to recheck with home meter    Education provided: Goal range and significance of current result    Plan made per [facility] anticoagulation protocol    Kiran Escalona RN  Anticoagulation Clinic  3/1/2022    _______________________________________________________________________     Anticoagulation Episode Summary     Current INR goal:  2.0-3.0   TTR:  74.0 % (1 y)   Target end date:  Indefinite   Send INR reminders to:  GISEL CANCHOLA    Indications    Deep vein thrombosis (DVT) (H) [I82.409] (Resolved) [I82.409]  Long-term (current) use of anticoagulants [Z79.01] [Z79.01]  Deep vein thrombosis (DVT) of left lower extremity  unspecified chronicity  unspecified vein (H) [I82.402]  Ulcerative colitis with complication  unspecified location (H) [K51.919]  Acute deep vein thrombosis (DVT) of right lower extremity  unspecified vein (H) (Resolved) [I82.401]           Comments:  [facility] INR Monitoring patient. Monitor by exception. OK to leave DVM.          Anticoagulation Care Providers     Provider Role Specialty Phone number    Carter Skinner PA-C Referring Family Medicine [phone]

## 2022-03-01 NOTE — TELEPHONE ENCOUNTER
Reason for Call:  INR follow up    Detailed comments: Patient is returning call. States there are no changes in diet or health.    Phone Number Patient can be reached at: Home number on file [phone] (home)    Best Time: any    Can we leave a detailed message on this number? YES    Call taken on 3/1/2022 at 8:04 AM by Chyna Robertson

## 2022-03-08 ENCOUNTER — TRANSFERRED RECORDS (OUTPATIENT)
Dept: HEALTH INFORMATION MANAGEMENT | Facility: CLINIC | Age: 52
End: 2022-03-08
Payer: COMMERCIAL

## 2022-03-16 ENCOUNTER — ANTICOAGULATION THERAPY VISIT (OUTPATIENT)
Dept: ANTICOAGULATION | Facility: CLINIC | Age: 52
End: 2022-03-16

## 2022-03-16 ENCOUNTER — OFFICE VISIT (OUTPATIENT)
Dept: FAMILY MEDICINE | Facility: CLINIC | Age: 52
End: 2022-03-16
Payer: COMMERCIAL

## 2022-03-16 VITALS
TEMPERATURE: 98.1 F | DIASTOLIC BLOOD PRESSURE: 80 MMHG | SYSTOLIC BLOOD PRESSURE: 120 MMHG | HEIGHT: 73 IN | BODY MASS INDEX: 35.25 KG/M2 | OXYGEN SATURATION: 97 % | RESPIRATION RATE: 22 BRPM | HEART RATE: 105 BPM | WEIGHT: 266 LBS

## 2022-03-16 DIAGNOSIS — R42 VERTIGO: ICD-10-CM

## 2022-03-16 DIAGNOSIS — Z79.01 LONG TERM CURRENT USE OF ANTICOAGULANT THERAPY: Primary | ICD-10-CM

## 2022-03-16 DIAGNOSIS — Z11.59 NEED FOR HEPATITIS C SCREENING TEST: ICD-10-CM

## 2022-03-16 DIAGNOSIS — E78.00 HIGH CHOLESTEROL: Primary | ICD-10-CM

## 2022-03-16 DIAGNOSIS — K51.919 ULCERATIVE COLITIS WITH COMPLICATION, UNSPECIFIED LOCATION (H): ICD-10-CM

## 2022-03-16 DIAGNOSIS — Z79.01 LONG TERM CURRENT USE OF ANTICOAGULANT THERAPY: ICD-10-CM

## 2022-03-16 DIAGNOSIS — E66.01 MORBID OBESITY (H): ICD-10-CM

## 2022-03-16 DIAGNOSIS — I82.402 DEEP VEIN THROMBOSIS (DVT) OF LEFT LOWER EXTREMITY, UNSPECIFIED CHRONICITY, UNSPECIFIED VEIN (H): ICD-10-CM

## 2022-03-16 LAB
ALBUMIN SERPL-MCNC: 4.1 G/DL (ref 3.4–5)
ALP SERPL-CCNC: 59 U/L (ref 40–150)
ALT SERPL W P-5'-P-CCNC: 62 U/L (ref 0–70)
ANION GAP SERPL CALCULATED.3IONS-SCNC: 9 MMOL/L (ref 3–14)
AST SERPL W P-5'-P-CCNC: 27 U/L (ref 0–45)
BILIRUB SERPL-MCNC: 1.4 MG/DL (ref 0.2–1.3)
BUN SERPL-MCNC: 18 MG/DL (ref 7–30)
CALCIUM SERPL-MCNC: 9.8 MG/DL (ref 8.5–10.1)
CHLORIDE BLD-SCNC: 106 MMOL/L (ref 94–109)
CHOLEST SERPL-MCNC: 156 MG/DL
CO2 SERPL-SCNC: 24 MMOL/L (ref 20–32)
CREAT SERPL-MCNC: 1.23 MG/DL (ref 0.66–1.25)
FASTING STATUS PATIENT QL REPORTED: YES
GFR SERPL CREATININE-BSD FRML MDRD: 71 ML/MIN/1.73M2
GLUCOSE BLD-MCNC: 91 MG/DL (ref 70–99)
HCV AB SERPL QL IA: NONREACTIVE
HDLC SERPL-MCNC: 77 MG/DL
INR HOME MONITORING: 2.1 (ref 2–3)
LDLC SERPL CALC-MCNC: 63 MG/DL
NONHDLC SERPL-MCNC: 79 MG/DL
POTASSIUM BLD-SCNC: 4.5 MMOL/L (ref 3.4–5.3)
PROT SERPL-MCNC: 8.2 G/DL (ref 6.8–8.8)
SODIUM SERPL-SCNC: 139 MMOL/L (ref 133–144)
TRIGL SERPL-MCNC: 81 MG/DL

## 2022-03-16 PROCEDURE — 86803 HEPATITIS C AB TEST: CPT | Performed by: PHYSICIAN ASSISTANT

## 2022-03-16 PROCEDURE — 80061 LIPID PANEL: CPT | Performed by: PHYSICIAN ASSISTANT

## 2022-03-16 PROCEDURE — 36415 COLL VENOUS BLD VENIPUNCTURE: CPT | Performed by: PHYSICIAN ASSISTANT

## 2022-03-16 PROCEDURE — 80053 COMPREHEN METABOLIC PANEL: CPT | Performed by: PHYSICIAN ASSISTANT

## 2022-03-16 PROCEDURE — 99213 OFFICE O/P EST LOW 20 MIN: CPT | Performed by: PHYSICIAN ASSISTANT

## 2022-03-16 RX ORDER — ATORVASTATIN CALCIUM 40 MG/1
40 TABLET, FILM COATED ORAL DAILY
Qty: 90 TABLET | Refills: 3 | Status: SHIPPED | OUTPATIENT
Start: 2022-03-16 | End: 2022-05-31

## 2022-03-16 ASSESSMENT — PAIN SCALES - GENERAL: PAINLEVEL: NO PAIN (0)

## 2022-03-16 NOTE — PROGRESS NOTES
"  Assessment & Plan       ICD-10-CM    1. High cholesterol  E78.00 atorvastatin (LIPITOR) 40 MG tablet     Lipid panel reflex to direct LDL Fasting     Comprehensive metabolic panel (BMP + Alb, Alk Phos, ALT, AST, Total. Bili, TP)   2. Vertigo  R42    3. Ulcerative colitis with complication, unspecified location (H)  K51.919    4. Long-term (current) use of anticoagulants [Z79.01]  Z79.01    5. Morbid obesity (H)  E66.01    6. Need for hepatitis C screening test  Z11.59 Hepatitis C Screen Reflex to HCV RNA Quant and Genotype     1. medical conditions are stable. meds refilled.  Labs pending. Follow up  Dependant on labs.   2. Suspect this is potentially a side effect from his infusions I encouraged him to talk with his specialist about this.  Labs are pending.      BMI:   Estimated body mass index is 35.09 kg/m  as calculated from the following:    Height as of this encounter: 1.854 m (6' 1\").    Weight as of this encounter: 120.7 kg (266 lb).   Weight management plan: Discussed healthy diet and exercise guidelines        Return in about 1 year (around 3/16/2023) for recheck.    JOHN [facility]    Beena Edwards is a 51 year old who presents for the following health issues     History of Present Illness       Hyperlipidemia:  He presents for follow up of hyperlipidemia.  He is taking medication to lower cholesterol. He is not having myalgia or other side effects to statin medications.    Hypertension: He presents for follow up of hypertension.  He does check blood pressure  regularly outside of the clinic. Outside blood pressures have been over 140/90. He does not follow a low salt diet.     Reason for visit:  Follow up from physical, cholesterol meds prescribed, have been light headed  Symptom onset:  3-7 days ago  Symptom intensity:  Severe  Symptom progression:  Staying the same  Had these symptoms before:  Yes  Has tried/received treatment for these symptoms:  " "Yes  Previous treatment was successful:  No  What makes it worse:  Standing  What makes it better:  Sitting    He eats 2-3 servings of fruits and vegetables daily.He consumes 0 sweetened beverage(s) daily.He exercises with enough effort to increase his heart rate 30 to 60 minutes per day.  He exercises with enough effort to increase his heart rate 7 days per week.   He is taking medications regularly.     BP have been high when he goes in for his Infusions.     History of UC and see's GI- getting infusions and getting dizziness from them.   Was seen in ED 12/21. Care Everywhere Reviewed    History of PE and on anticoagulation therapy.      Review of Systems   Constitutional, HEENT, cardiovascular, pulmonary, gi and gu systems are negative, except as otherwise noted.      Objective    /80   Pulse 105   Temp 98.1  F (36.7  C) (Tympanic)   Resp 22   Ht 1.854 m (6' 1\")   Wt 120.7 kg (266 lb)   SpO2 97%   BMI 35.09 kg/m    Body mass index is 35.09 kg/m .  Physical Exam   GENERAL: healthy, alert and no distress  EYES: Eyes grossly normal to inspection, PERRL and conjunctivae and sclerae normal  HENT: ear canals and TM's normal, nose and mouth without ulcers or lesions  NECK: no adenopathy, no asymmetry, masses, or scars and thyroid normal to palpation  RESP: lungs clear to auscultation - no rales, rhonchi or wheezes  CV: regular rate and rhythm, normal S1 S2, no S3 or S4, no murmur, click or rub, no peripheral edema and peripheral pulses strong  ABDOMEN: soft, nontender, no hepatosplenomegaly, no masses and bowel sounds normal  MS: no gross musculoskeletal defects noted, no edema  SKIN: no suspicious lesions or rashes  NEURO: Normal strength and tone, mentation intact and speech normal  PSYCH: mentation appears normal, affect normal/bright    Labs pending.            "

## 2022-03-16 NOTE — PROGRESS NOTES
ANTICOAGULATION MANAGEMENT     Donato De Leon 51 year old male is on warfarin with therapeutic INR result. (Goal INR 2.0-3.0)    Recent labs: (last 7 days)     03/16/22  0000   INR 2.10       ASSESSMENT       Source(s): Chart Review and Patient/Caregiver Call       Warfarin doses taken: Warfarin taken as instructed    Diet: No new diet changes identified    New illness, injury, or hospitalization: No    Medication/supplement changes: None noted    Signs or symptoms of bleeding or clotting: No    Previous INR: Supratherapeutic    Additional findings: None       PLAN     Recommended plan for no diet, medication or health factor changes affecting INR     Dosing Instructions: Continue your current warfarin dose with next INR in 2 weeks       Summary  As of 3/16/2022    Full warfarin instructions:  5 mg every Mon; 10 mg all other days   Next INR check:  3/30/2022             Telephone call with Donato who verbalizes understanding and agrees to plan    Patient to recheck with home meter    Education provided: Goal range and significance of current result and Importance of therapeutic range    Plan made per [facility] anticoagulation protocol    Barrera Jeter RN  Anticoagulation Clinic  3/16/2022    _______________________________________________________________________     Anticoagulation Episode Summary     Current INR goal:  2.0-3.0   TTR:  73.6 % (1 y)   Target end date:  Indefinite   Send INR reminders to:  GISEL CANCHOLA    Indications    Deep vein thrombosis (DVT) (H) [I82.409] (Resolved) [I82.409]  Long-term (current) use of anticoagulants [Z79.01] [Z79.01]  Deep vein thrombosis (DVT) of left lower extremity  unspecified chronicity  unspecified vein (H) [I82.402]  Ulcerative colitis with complication  unspecified location (H) [K51.919]  Acute deep vein thrombosis (DVT) of right lower extremity  unspecified vein (H) (Resolved) [I82.401]           Comments:  [facility] INR Monitoring patient. Monitor by exception. OK to  leave DVM.         Anticoagulation Care Providers     Provider Role Specialty Phone number    Carter Skinner PA-C Referring Family Medicine [phone]

## 2022-03-17 NOTE — RESULT ENCOUNTER NOTE
MrMariaa De Leon,    All of your labs were normal/near normal for you.    Please contact the clinic if you have additional questions.  Thank you.    Sincerely,    Carter Skinner PA-C

## 2022-03-28 ENCOUNTER — TRANSFERRED RECORDS (OUTPATIENT)
Dept: HEALTH INFORMATION MANAGEMENT | Facility: CLINIC | Age: 52
End: 2022-03-28
Payer: COMMERCIAL

## 2022-04-04 ENCOUNTER — DOCUMENTATION ONLY (OUTPATIENT)
Dept: ANTICOAGULATION | Facility: CLINIC | Age: 52
End: 2022-04-04
Payer: COMMERCIAL

## 2022-04-04 DIAGNOSIS — Z79.01 LONG TERM CURRENT USE OF ANTICOAGULANT THERAPY: Primary | ICD-10-CM

## 2022-04-04 DIAGNOSIS — I82.402 DEEP VEIN THROMBOSIS (DVT) OF LEFT LOWER EXTREMITY, UNSPECIFIED CHRONICITY, UNSPECIFIED VEIN (H): ICD-10-CM

## 2022-04-04 DIAGNOSIS — K51.919 ULCERATIVE COLITIS WITH COMPLICATION, UNSPECIFIED LOCATION (H): ICD-10-CM

## 2022-04-04 LAB — INR HOME MONITORING: 2.6 (ref 2–3)

## 2022-04-04 NOTE — PROGRESS NOTES
ANTICOAGULATION  MANAGEMENT-Home Monitor Managed by Exception    Donato EUGENE De Leon 52 year old male is on warfarin with therapeutic INR result. (Goal INR 2.0-3.0)    Recent labs: (last 7 days)     04/04/22  0000   INR 2.60         Previous INR was Therapeutic    Medication, diet, health changes since last INR:chart reviewed; none identified    Contacted within the last 12 weeks by phone on 3/16/22      JULEE Sewell was NOT contacted regarding therapeutic result today per home monitoring policy manage by exception agreement.   Current warfarin dose is to be continued:     Summary  As of 4/4/2022    Full warfarin instructions:  5 mg every Mon; 10 mg all other days   Next INR check:  4/18/2022           ?   Annette Preciado RN  Anticoagulation Clinic  4/4/2022    _______________________________________________________________________     Anticoagulation Episode Summary     Current INR goal:  2.0-3.0   TTR:  73.6 % (1 y)   Target end date:  Indefinite   Send INR reminders to:  GISEL CANCHOLA    Indications    Deep vein thrombosis (DVT) (H) [I82.409] (Resolved) [I82.409]  Long-term (current) use of anticoagulants [Z79.01] [Z79.01]  Deep vein thrombosis (DVT) of left lower extremity  unspecified chronicity  unspecified vein (H) [I82.402]  Ulcerative colitis with complication  unspecified location (H) [K51.919]  Acute deep vein thrombosis (DVT) of right lower extremity  unspecified vein (H) (Resolved) [I82.401]           Comments:  [facility] INR Monitoring patient. Monitor by exception. OK to leave DVM.         Anticoagulation Care Providers     Provider Role Specialty Phone number    Carter Skinner PA-C Referring Family Medicine [phone]

## 2022-04-21 ENCOUNTER — DOCUMENTATION ONLY (OUTPATIENT)
Dept: ANTICOAGULATION | Facility: CLINIC | Age: 52
End: 2022-04-21
Payer: COMMERCIAL

## 2022-04-21 DIAGNOSIS — Z79.01 LONG TERM CURRENT USE OF ANTICOAGULANT THERAPY: ICD-10-CM

## 2022-04-21 DIAGNOSIS — I82.402 DEEP VEIN THROMBOSIS (DVT) OF LEFT LOWER EXTREMITY, UNSPECIFIED CHRONICITY, UNSPECIFIED VEIN (H): Primary | ICD-10-CM

## 2022-04-21 NOTE — PROGRESS NOTES
ANTICOAGULATION MANAGEMENT      Donato De Leon due for annual renewal of referral to anticoagulation monitoring. Order pended for your review and signature.      ANTICOAGULATION SUMMARY      Warfarin indication(s)     DVT    Heart valve present?  NO       Current goal range   INR: 2.0-3.0     Goal appropriate for indication? Yes, INR 2-3 appropriate for hx of DVT, PE, hypercoagulable state, Afib, LVAD, or bileaflet AVR without risk factors     Current duration of therapy Indefinite/long term therapy   Time in Therapeutic Range (TTR)  (Goal > 60%) 73.6%       Office visit with referring provider's group within last year yes on 8/11/21       Kia Domingo RN

## 2022-04-25 ENCOUNTER — DOCUMENTATION ONLY (OUTPATIENT)
Dept: ANTICOAGULATION | Facility: CLINIC | Age: 52
End: 2022-04-25
Payer: COMMERCIAL

## 2022-04-25 DIAGNOSIS — K51.919 ULCERATIVE COLITIS WITH COMPLICATION, UNSPECIFIED LOCATION (H): ICD-10-CM

## 2022-04-25 DIAGNOSIS — I82.402 DEEP VEIN THROMBOSIS (DVT) OF LEFT LOWER EXTREMITY, UNSPECIFIED CHRONICITY, UNSPECIFIED VEIN (H): ICD-10-CM

## 2022-04-25 DIAGNOSIS — Z79.01 LONG TERM CURRENT USE OF ANTICOAGULANT THERAPY: Primary | ICD-10-CM

## 2022-04-25 LAB — INR HOME MONITORING: 2.4 (ref 2–3)

## 2022-04-25 NOTE — PROGRESS NOTES
ANTICOAGULATION  MANAGEMENT-Home Monitor Managed by Exception    Donato EUGENE De Leon 52 year old male is on warfarin with therapeutic INR result. (Goal INR 2.0-3.0)    Recent labs: (last 7 days)     04/25/22  0000   INR 2.40         Previous INR was Therapeutic    Medication, diet, health changes since last INR:chart reviewed; none identified    Contacted within the last 12 weeks by phone on 3/16/22      JULEE Sewell was NOT contacted regarding therapeutic result today per home monitoring policy manage by exception agreement.   Current warfarin dose is to be continued:     Summary  As of 4/25/2022    Full warfarin instructions:  5 mg every Mon; 10 mg all other days   Next INR check:  5/9/2022           ?   Annette Preciado RN  Anticoagulation Clinic  4/25/2022    _______________________________________________________________________     Anticoagulation Episode Summary     Current INR goal:  2.0-3.0   TTR:  73.6 % (1 y)   Target end date:  Indefinite   Send INR reminders to:  GISEL CANCHOLA    Indications    Deep vein thrombosis (DVT) (H) [I82.409] (Resolved) [I82.409]  Long-term (current) use of anticoagulants [Z79.01] [Z79.01]  Deep vein thrombosis (DVT) of left lower extremity  unspecified chronicity  unspecified vein (H) [I82.402]  Ulcerative colitis with complication  unspecified location (H) [K51.919]  Acute deep vein thrombosis (DVT) of right lower extremity  unspecified vein (H) (Resolved) [I82.401]           Comments:  [facility] INR Monitoring patient. Monitor by exception. OK to leave DVM.         Anticoagulation Care Providers     Provider Role Specialty Phone number    Carter Skinner PA-C Referring Family Medicine [phone]             Addended by: MIKE COLLINS on: 6/16/2020 07:53 AM     Modules accepted: Orders

## 2022-05-04 NOTE — PROGRESS NOTES
ANTICOAGULATION FOLLOW-UP CLINIC VISIT    Patient Name:  Donato De Leon  Date:  12/11/2019  Contact Type:  Telephone    SUBJECTIVE:  Patient Findings     Positives:   Change in medications    Comments:   INR per home monitor 3.7. was 3.8 a week ago. Patient on antibiotic for pneumonia. Left message on patient's voicemail to reduce warfarin dose today and tomorrow and Call back if any concerns with bleeding or bruising or any other questions. Recheck INR in 10-14 days.         Clinical Outcomes     Comments:   INR per home monitor 3.7. was 3.8 a week ago. Patient on antibiotic for pneumonia. Left message on patient's voicemail to reduce warfarin dose today and tomorrow and Call back if any concerns with bleeding or bruising or any other questions. Recheck INR in 10-14 days.            OBJECTIVE    INR   Date Value Ref Range Status   12/11/2019 3.7 (A) 0.9 - 1.1 Final       ASSESSMENT / PLAN  INR assessment SUPRA    Recheck INR In: 10 DAYS    INR Location Home INR    Billed home INR Yes      Anticoagulation Summary  As of 12/11/2019    INR goal:   2.0-3.0   TTR:   33.9 % (1 y)   INR used for dosing:   3.7! (12/11/2019)   Warfarin maintenance plan:   15 mg (5 mg x 3) every Wed; 10 mg (5 mg x 2) all other days   Full warfarin instructions:   12/11: 5 mg; 12/12: 5 mg; Otherwise 15 mg every Wed; 10 mg all other days   Weekly warfarin total:   75 mg   Plan last modified:   Lyudmila Good RN (8/22/2019)   Next INR check:   12/24/2019   Priority:   High   Target end date:       Indications    Deep vein thrombosis (DVT) (H) [I82.409] [I82.409]  Long-term (current) use of anticoagulants [Z79.01] [Z79.01]             Anticoagulation Episode Summary     INR check location:       Preferred lab:       Send INR reminders to:   GISEL PERSAUD    Comments:         Anticoagulation Care Providers     Provider Role Specialty Phone number    Carter Skinner PA-C Responsible Physician Assistant [phone]            See  the Encounter Report to view Anticoagulation Flowsheet and Dosing Calendar (Go to Encounters tab in chart review, and find the Anticoagulation Therapy Visit)        Lyudmila Good RN                  Quality 130: Documentation Of Current Medications In The Medical Record: Current Medications Documented Detail Level: Detailed Quality 431: Preventive Care And Screening: Unhealthy Alcohol Use - Screening: Patient not identified as an unhealthy alcohol user when screened for unhealthy alcohol use using a systematic screening method Quality 110: Preventive Care And Screening: Influenza Immunization: Influenza Immunization Administered during Influenza season Quality 226: Preventive Care And Screening: Tobacco Use: Screening And Cessation Intervention: Patient screened for tobacco use and is an ex/non-smoker

## 2022-05-16 ENCOUNTER — DOCUMENTATION ONLY (OUTPATIENT)
Dept: ANTICOAGULATION | Facility: CLINIC | Age: 52
End: 2022-05-16
Payer: COMMERCIAL

## 2022-05-16 DIAGNOSIS — Z79.01 LONG TERM CURRENT USE OF ANTICOAGULANT THERAPY: Primary | ICD-10-CM

## 2022-05-16 DIAGNOSIS — K51.919 ULCERATIVE COLITIS WITH COMPLICATION, UNSPECIFIED LOCATION (H): ICD-10-CM

## 2022-05-16 DIAGNOSIS — I82.402 DEEP VEIN THROMBOSIS (DVT) OF LEFT LOWER EXTREMITY, UNSPECIFIED CHRONICITY, UNSPECIFIED VEIN (H): ICD-10-CM

## 2022-05-16 LAB — INR HOME MONITORING: 2.4 (ref 2–3)

## 2022-05-16 NOTE — PROGRESS NOTES
ANTICOAGULATION  MANAGEMENT-Home Monitor Managed by Exception    Donato EUGENE De Leon 52 year old male is on warfarin with therapeutic INR result. (Goal INR 2.0-3.0)    Recent labs: (last 7 days)     05/16/22  0000   INR 2.40         Previous INR was Therapeutic    Medication, diet, health changes since last INR:chart reviewed; none identified    Contacted within the last 12 weeks by phone on 3/16/22      JULEE Sewell was NOT contacted regarding therapeutic result today per home monitoring policy manage by exception agreement.   Current warfarin dose is to be continued:     Summary  As of 5/16/2022    Full warfarin instructions:  5 mg every Mon; 10 mg all other days   Next INR check:  5/31/2022           ?   Annette Preciado RN  Anticoagulation Clinic  5/16/2022    _______________________________________________________________________     Anticoagulation Episode Summary     Current INR goal:  2.0-3.0   TTR:  76.9 % (1 y)   Target end date:  Indefinite   Send INR reminders to:  GISEL CANCHOLA    Indications    Deep vein thrombosis (DVT) (H) [I82.409] (Resolved) [I82.409]  Long-term (current) use of anticoagulants [Z79.01] [Z79.01]  Deep vein thrombosis (DVT) of left lower extremity  unspecified chronicity  unspecified vein (H) [I82.402]  Ulcerative colitis with complication  unspecified location (H) [K51.919]  Acute deep vein thrombosis (DVT) of right lower extremity  unspecified vein (H) (Resolved) [I82.401]           Comments:  [facility] INR Monitoring patient. Monitor by exception. OK to leave DVM.         Anticoagulation Care Providers     Provider Role Specialty Phone number    Carter Skinner PA-C Referring Family Medicine [phone]

## 2022-05-19 ENCOUNTER — TRANSFERRED RECORDS (OUTPATIENT)
Dept: HEALTH INFORMATION MANAGEMENT | Facility: CLINIC | Age: 52
End: 2022-05-19
Payer: COMMERCIAL

## 2022-06-06 ENCOUNTER — DOCUMENTATION ONLY (OUTPATIENT)
Dept: ANTICOAGULATION | Facility: CLINIC | Age: 52
End: 2022-06-06
Payer: COMMERCIAL

## 2022-06-06 DIAGNOSIS — K51.919 ULCERATIVE COLITIS WITH COMPLICATION, UNSPECIFIED LOCATION (H): ICD-10-CM

## 2022-06-06 DIAGNOSIS — Z79.01 LONG TERM CURRENT USE OF ANTICOAGULANT THERAPY: Primary | ICD-10-CM

## 2022-06-06 DIAGNOSIS — I82.402 DEEP VEIN THROMBOSIS (DVT) OF LEFT LOWER EXTREMITY, UNSPECIFIED CHRONICITY, UNSPECIFIED VEIN (H): ICD-10-CM

## 2022-06-06 LAB — INR HOME MONITORING: 2.7 (ref 2–3)

## 2022-06-06 NOTE — PROGRESS NOTES
ANTICOAGULATION  MANAGEMENT-Home Monitor Managed by Exception    Donato EUGENE De Leon 52 year old male is on warfarin with therapeutic INR result. (Goal INR 2.0-3.0)    Recent labs: (last 7 days)     06/06/22  0000   INR 2.70         Previous INR was Therapeutic    Medication, diet, health changes since last INR:chart reviewed; none identified    Contacted within the last 12 weeks by phone on 3/16/22      JULEE Sewell was NOT contacted regarding therapeutic result today per home monitoring policy manage by exception agreement.   Current warfarin dose is to be continued:     Summary  As of 6/6/2022    Full warfarin instructions:  5 mg every Mon; 10 mg all other days   Next INR check:  6/20/2022           ?   Annette Preciado RN  Anticoagulation Clinic  6/6/2022    _______________________________________________________________________     Anticoagulation Episode Summary     Current INR goal:  2.0-3.0   TTR:  79.9 % (1 y)   Target end date:  Indefinite   Send INR reminders to:  GISEL CANCHOLA    Indications    Deep vein thrombosis (DVT) (H) [I82.409] (Resolved) [I82.409]  Long-term (current) use of anticoagulants [Z79.01] [Z79.01]  Deep vein thrombosis (DVT) of left lower extremity  unspecified chronicity  unspecified vein (H) [I82.402]  Ulcerative colitis with complication  unspecified location (H) [K51.919]  Acute deep vein thrombosis (DVT) of right lower extremity  unspecified vein (H) (Resolved) [I82.401]           Comments:  [facility] INR Monitoring patient. Monitor by exception. OK to leave DVM.         Anticoagulation Care Providers     Provider Role Specialty Phone number    Carter Skinner PA-C Referring Family Medicine [phone]

## 2022-06-15 ENCOUNTER — TRANSFERRED RECORDS (OUTPATIENT)
Dept: HEALTH INFORMATION MANAGEMENT | Facility: CLINIC | Age: 52
End: 2022-06-15

## 2022-06-25 ENCOUNTER — OFFICE VISIT (OUTPATIENT)
Dept: URGENT CARE | Facility: URGENT CARE | Age: 52
End: 2022-06-25
Payer: COMMERCIAL

## 2022-06-25 ENCOUNTER — NURSE TRIAGE (OUTPATIENT)
Dept: NURSING | Facility: CLINIC | Age: 52
End: 2022-06-25

## 2022-06-25 VITALS
TEMPERATURE: 98 F | SYSTOLIC BLOOD PRESSURE: 126 MMHG | HEART RATE: 97 BPM | DIASTOLIC BLOOD PRESSURE: 84 MMHG | RESPIRATION RATE: 16 BRPM | OXYGEN SATURATION: 96 %

## 2022-06-25 DIAGNOSIS — M79.672 PAIN OF LEFT HEEL: Primary | ICD-10-CM

## 2022-06-25 PROCEDURE — 99213 OFFICE O/P EST LOW 20 MIN: CPT | Performed by: FAMILY MEDICINE

## 2022-06-25 RX ORDER — PREDNISONE 20 MG/1
40 TABLET ORAL DAILY
Qty: 14 TABLET | Refills: 0 | Status: SHIPPED | OUTPATIENT
Start: 2022-06-25 | End: 2022-07-02

## 2022-06-25 RX ORDER — VEDOLIZUMAB 300 MG/5ML
INJECTION, POWDER, LYOPHILIZED, FOR SOLUTION INTRAVENOUS
COMMUNITY
End: 2023-10-18

## 2022-06-25 NOTE — PROGRESS NOTES
Assessment & Plan     Pain of left heel  Differentials discussed in detail involving acute gout versus tendinopathy.  Patient had couple of drinks of wine last evening, history of gout and woke up with left ear pain this morning, presentation favoring diagnosis of gout flareup.  Prednisone prescribed, common side effects discussed.  Dietary counseling and written information provided.  Follow-up with PCP in 3 to 5 days or earlier if needed.  Patient, spouse understood and in agreement with above plan.  All questions answered.  - predniSONE (DELTASONE) 20 MG tablet; Take 2 tablets (40 mg) by mouth daily for 7 days      Brian Wood MD  [facility]    Subjective   Jerry is a 52 year old accompanied by his spouse., presenting for the following health issues:  Foot Pain (Left foot pain- woke up this morning with pain. No known injury)      HPI     Concern -   Onset: woke up this morning   Description: left heel pain, ubale to weight bear, patient had couple of wine drinks last evening  Intensity: moderate  Progression of Symptoms:  worsening  Accompanying Signs & Symptoms: no fever, chills or other relevant systemic symptoms, no recent history of fall/trauma  Previous history of similar problem: gout   Therapies tried and outcome: tylenol, ice         Review of Systems   Constitutional, HEENT, cardiovascular, pulmonary, gi and gu systems are negative, except as otherwise noted.      Objective    /84   Pulse 97   Temp 98  F (36.7  C) (Tympanic)   Resp 16   SpO2 96%   There is no height or weight on file to calculate BMI.  Physical Exam   GENERAL: alert and on wheelchair  EYES: Eyes grossly normal to inspection, PERRL and conjunctivae and sclerae normal  RESP: no wheezes  MS: subjective left heel pain, Canela and Homans' sign negative, unable to weight-bear, mildly tender, no significant swelling or skin discoloration noted, pedal pulses 3+  SKIN: no suspicious lesions or  rashes  NEURO: Normal strength and tone, mentation intact and speech normal  PSYCH: mentation appears normal, affect normal/bright          .  ..

## 2022-06-25 NOTE — TELEPHONE ENCOUNTER
Triage call:     Woke up at around 5 am with pain in his   Tylenol and ice   Heel in his left foot  Rating pain 8/10- having trouble putting weight on the heel  States he is limping due to the pain and not able to put weight on the foot without pain  Unsure what may have caused this pain  Denies additional symptoms  States over the counter pain medication did not help     Advised to be seen in the urgent care today- reviewed additional care advice with patient and he verbalizes understanding. Will go to [facility] today to be seen.     Zulema Walker RN BSN 6/25/2022 12:58 PM       Reason for Disposition    [1] SEVERE pain (e.g., excruciating, unable to do any normal activities) AND [2] not improved after 2 hours of pain medicine    Additional Information    Negative: Followed a foot injury    Negative: Diabetes    Negative: Ankle pain is main symptom    Negative: Thigh or calf pain is main symptom    Negative: Entire foot is cool or blue in comparison to other foot    Negative: Purple or black skin on foot or toe    Negative: [1] Red area or streak AND [2] fever    Negative: [1] Swollen foot AND [2] fever    Negative: Patient sounds very sick or weak to the triager    Protocols used: FOOT PAIN-A-AH

## 2022-06-28 ENCOUNTER — ANTICOAGULATION THERAPY VISIT (OUTPATIENT)
Dept: ANTICOAGULATION | Facility: CLINIC | Age: 52
End: 2022-06-28

## 2022-06-28 ENCOUNTER — DOCUMENTATION ONLY (OUTPATIENT)
Dept: ANTICOAGULATION | Facility: CLINIC | Age: 52
End: 2022-06-28

## 2022-06-28 ENCOUNTER — OFFICE VISIT (OUTPATIENT)
Dept: URGENT CARE | Facility: URGENT CARE | Age: 52
End: 2022-06-28
Payer: COMMERCIAL

## 2022-06-28 VITALS
SYSTOLIC BLOOD PRESSURE: 145 MMHG | DIASTOLIC BLOOD PRESSURE: 100 MMHG | HEART RATE: 86 BPM | TEMPERATURE: 97.7 F | OXYGEN SATURATION: 98 % | RESPIRATION RATE: 16 BRPM

## 2022-06-28 DIAGNOSIS — M72.2 PLANTAR FASCIITIS: ICD-10-CM

## 2022-06-28 DIAGNOSIS — Z79.01 LONG TERM CURRENT USE OF ANTICOAGULANT THERAPY: Primary | ICD-10-CM

## 2022-06-28 DIAGNOSIS — M25.572 PAIN IN JOINT INVOLVING ANKLE AND FOOT, LEFT: ICD-10-CM

## 2022-06-28 DIAGNOSIS — I82.402 DEEP VEIN THROMBOSIS (DVT) OF LEFT LOWER EXTREMITY, UNSPECIFIED CHRONICITY, UNSPECIFIED VEIN (H): ICD-10-CM

## 2022-06-28 DIAGNOSIS — M79.672 LEFT FOOT PAIN: ICD-10-CM

## 2022-06-28 DIAGNOSIS — K51.919 ULCERATIVE COLITIS WITH COMPLICATION, UNSPECIFIED LOCATION (H): ICD-10-CM

## 2022-06-28 DIAGNOSIS — Z87.39 HISTORY OF GOUT: ICD-10-CM

## 2022-06-28 LAB
BASOPHILS # BLD AUTO: 0 10E3/UL (ref 0–0.2)
BASOPHILS NFR BLD AUTO: 0 %
CRP SERPL-MCNC: <2.9 MG/L (ref 0–8)
EOSINOPHIL # BLD AUTO: 0 10E3/UL (ref 0–0.7)
EOSINOPHIL NFR BLD AUTO: 0 %
ERYTHROCYTE [DISTWIDTH] IN BLOOD BY AUTOMATED COUNT: 14.1 % (ref 10–15)
ERYTHROCYTE [SEDIMENTATION RATE] IN BLOOD BY WESTERGREN METHOD: 9 MM/HR (ref 0–20)
HCT VFR BLD AUTO: 46.8 % (ref 40–53)
HGB BLD-MCNC: 16.1 G/DL (ref 13.3–17.7)
INR HOME MONITORING: 1.9 (ref 2–3)
LYMPHOCYTES # BLD AUTO: 1.2 10E3/UL (ref 0.8–5.3)
LYMPHOCYTES NFR BLD AUTO: 10 %
MCH RBC QN AUTO: 31.2 PG (ref 26.5–33)
MCHC RBC AUTO-ENTMCNC: 34.4 G/DL (ref 31.5–36.5)
MCV RBC AUTO: 91 FL (ref 78–100)
MONOCYTES # BLD AUTO: 0.6 10E3/UL (ref 0–1.3)
MONOCYTES NFR BLD AUTO: 5 %
NEUTROPHILS # BLD AUTO: 10.3 10E3/UL (ref 1.6–8.3)
NEUTROPHILS NFR BLD AUTO: 85 %
PLATELET # BLD AUTO: 197 10E3/UL (ref 150–450)
RBC # BLD AUTO: 5.16 10E6/UL (ref 4.4–5.9)
URATE SERPL-MCNC: 5.6 MG/DL (ref 3.5–7.2)
WBC # BLD AUTO: 12.1 10E3/UL (ref 4–11)

## 2022-06-28 PROCEDURE — 84550 ASSAY OF BLOOD/URIC ACID: CPT | Performed by: PHYSICIAN ASSISTANT

## 2022-06-28 PROCEDURE — 86140 C-REACTIVE PROTEIN: CPT | Performed by: PHYSICIAN ASSISTANT

## 2022-06-28 PROCEDURE — 85652 RBC SED RATE AUTOMATED: CPT | Performed by: PHYSICIAN ASSISTANT

## 2022-06-28 PROCEDURE — 36415 COLL VENOUS BLD VENIPUNCTURE: CPT | Performed by: PHYSICIAN ASSISTANT

## 2022-06-28 PROCEDURE — 85025 COMPLETE CBC W/AUTO DIFF WBC: CPT | Performed by: PHYSICIAN ASSISTANT

## 2022-06-28 PROCEDURE — 99214 OFFICE O/P EST MOD 30 MIN: CPT | Performed by: PHYSICIAN ASSISTANT

## 2022-06-28 RX ORDER — PREDNISONE 20 MG/1
60 TABLET ORAL DAILY
Qty: 15 TABLET | Refills: 0 | Status: SHIPPED | OUTPATIENT
Start: 2022-06-28 | End: 2022-07-03

## 2022-06-28 NOTE — PROGRESS NOTES
Patient presents with:  Foot Pain: Left foot pain since Saturday morning   Pt seen Saturday for this      Clinical Decision Making:  Patient has had a past medical history for gout and was on a prednisone taper.  The prednisone is increased to 60 mg for a 7-day course.  Patient also may have some crossover with plantar fasciitis.  I do recommend that you follow-up with podiatry for having a steroid injection into the foot.  Also testing for the gout was performed in the office to establish baseline and may be rechecked in the future to have tracking to see if the levels are increasing decreasing or staying the same.  Risks and benefits of prednisone were gone over to include but not limited to if he has difficulty with melanotic stools or hematemesis to discontinue the medication and follow-up with his primary care provider since he is on anticoagulation therapy.  Also future concern with podiatry is to be fitted for shoe inserts for support.  Brief office intervention on symptomatic care for plantar fasciitis was reviewed.      ICD-10-CM    1. Left foot pain  M79.672    2. Pain in joint involving ankle and foot, left  M25.572 XR Foot Left G/E 3 Views     XR Ankle Left G/E 3 Views     CBC with Platelets & Differential     Uric acid     Erythrocyte sedimentation rate auto     CRP inflammation     predniSONE (DELTASONE) 20 MG tablet   3. History of gout  Z87.39 CBC with Platelets & Differential     Uric acid     Erythrocyte sedimentation rate auto     CRP inflammation     predniSONE (DELTASONE) 20 MG tablet   4. Plantar fasciitis  M72.2 predniSONE (DELTASONE) 20 MG tablet           Patient Instructions   You are treated with extended course of prednisone.  Use your treatment for plantar fasciitis to include the shoe inserts.  Do not walk barefoot.  Stretching of the plantar fascia and topical ice.  Return to see podiatry for reevaluation of your plantar fasciitis treatment      Take the medication as written.  Follow  up with primary care provider if you do not get resolution with the course of treatment.  Return to walk-in care if complication or new symptoms arise in the interim.                        HPI:  Donato De Leon is a 52 year old male who is on long-term anticoagulation therapy and has a history of gout who presents today for left foot pain over the last 4 days.  Patient is recently been seen in urgent care on 6/25/2022.  Please see visit notes for specifics.  In summary patient was given a prednisone Dosepak.  Patient is still having difficulty with 25% weightbearing on the left heel and having pain into the foot.  Patient has had a history of gout in the past.  No laboratory values were performed last time.     History obtained from chart review and the patient.    Problem List:  2022-03: Morbid obesity (H)  2021-08: High cholesterol  2021-04: Acute deep vein thrombosis (DVT) of right lower extremity,   unspecified vein (H)  2018-08: Elevated LFTs  2017-07: Deep vein thrombosis (DVT) of left lower extremity,   unspecified chronicity, unspecified vein (H)  2017-05: Acute gout of right foot, unspecified cause  2017-03: BMI 32.0-32.9,adult  2017-03: Ulcerative colitis with complication, unspecified location   (H)  2017-03: Deep vein thrombosis (DVT) (H) [I82.409]  2017-03: Long-term (current) use of anticoagulants [Z79.01]  2016-04: Gouty arthritis of toe of right foot  2016-04: Ulcerative colitis with complication, unspecified ulcerative   colitis  2015-03: Plantar fasciitis  2015-03: Cough  2012-08: Cellulitis of arm, right  2010-10: CARDIOVASCULAR SCREENING; LDL GOAL LESS THAN 160  Colitis, ulcerative (H)  Renal lithiasis      Past Medical History:   Diagnosis Date     Acute gout of right foot, unspecified cause 5/3/2017     Colitis, ulcerative (H)      Deep vein thrombosis (DVT) of left lower extremity, unspecified chronicity, unspecified vein (H) 7/11/2017     Long-term (current) use of anticoagulants [Z79.01]  3/29/2017     Renal lithiasis        Social History     Tobacco Use     Smoking status: Never Smoker     Smokeless tobacco: Never Used   Substance Use Topics     Alcohol use: Yes     Comment: rare       Review of Systems  As above in HPI otherwise negative.    Vitals:    06/28/22 1104   BP: (!) 145/100   Pulse: 86   Resp: 16   Temp: 97.7  F (36.5  C)   TempSrc: Tympanic   SpO2: 98%       General: Patient is resting comfortably no acute distress is afebrile  HEENT: Head is normocephalic atraumatic   Skin: Without rash non-diaphoretic  Musculoskeletal: Patient has pain over the calcaneus over the left plantar fascia origin.  This does reproduce his pain.    Physical Exam      Labs:  Results for orders placed or performed in visit on 06/28/22   INR (External Result)     Status: Abnormal   Result Value Ref Range    INR HOME MONITORING 1.9 (L) 2.000 - 3.000   Results for orders placed or performed in visit on 06/28/22   XR Foot Left G/E 3 Views     Status: None    Narrative    XR FOOT LEFT G/E 3 VIEWS 6/28/2022 11:41 AM     HISTORY: plantar fasciitis; Pain in joint involving ankle and foot,  left    COMPARISON: 12/20/2016.      Impression    IMPRESSION: No fractures are evident. Normal joint spacing. Plantar  and Achilles calcaneal spurs. Findings are stable.    HAL AMARO MD         SYSTEM ID:  [id number]   XR Ankle Left G/E 3 Views     Status: None    Narrative    XR ANKLE LEFT G/E 3 VIEWS 6/28/2022 11:40 AM     HISTORY: history of gout and ankle pain; Pain in joint involving ankle  and foot, left    COMPARISON: None.      Impression    IMPRESSION: No fractures are identified. The ankle mortise is intact.  The talar dome is unremarkable. No ankle joint effusion. No calcified  tophi. Plantar and Achilles calcaneal spurs.     HAL AMARO MD         SYSTEM ID:  [id number]   Uric acid     Status: Normal   Result Value Ref Range    Uric Acid 5.6 3.5 - 7.2 mg/dL   Erythrocyte sedimentation rate auto     Status:  Normal   Result Value Ref Range    Erythrocyte Sedimentation Rate 9 0 - 20 mm/hr   CRP inflammation     Status: Normal   Result Value Ref Range    CRP Inflammation <2.9 0.0 - 8.0 mg/L   CBC with platelets and differential     Status: Abnormal   Result Value Ref Range    WBC Count 12.1 (H) 4.0 - 11.0 10e3/uL    RBC Count 5.16 4.40 - 5.90 10e6/uL    Hemoglobin 16.1 13.3 - 17.7 g/dL    Hematocrit 46.8 40.0 - 53.0 %    MCV 91 78 - 100 fL    MCH 31.2 26.5 - 33.0 pg    MCHC 34.4 31.5 - 36.5 g/dL    RDW 14.1 10.0 - 15.0 %    Platelet Count 197 150 - 450 10e3/uL    % Neutrophils 85 %    % Lymphocytes 10 %    % Monocytes 5 %    % Eosinophils 0 %    % Basophils 0 %    Absolute Neutrophils 10.3 (H) 1.6 - 8.3 10e3/uL    Absolute Lymphocytes 1.2 0.8 - 5.3 10e3/uL    Absolute Monocytes 0.6 0.0 - 1.3 10e3/uL    Absolute Eosinophils 0.0 0.0 - 0.7 10e3/uL    Absolute Basophils 0.0 0.0 - 0.2 10e3/uL   CBC with Platelets & Differential     Status: Abnormal    Narrative    The following orders were created for panel order CBC with Platelets & Differential.  Procedure                               Abnormality         Status                     ---------                               -----------         ------                     CBC with platelets and d...[[id number]]  Abnormal            Final result                 Please view results for these tests on the individual orders.     At the end of the encounter, I discussed results, diagnosis, medications. Discussed red flags for immediate return to clinic/ER, as well as indications for follow up if no improvement. Patient understood and agreed to plan. Patient was stable for discharge.

## 2022-06-28 NOTE — PATIENT INSTRUCTIONS
You are treated with extended course of prednisone.  Use your treatment for plantar fasciitis to include the shoe inserts.  Do not walk barefoot.  Stretching of the plantar fascia and topical ice.  Return to see podiatry for reevaluation of your plantar fasciitis treatment      Take the medication as written.  Follow up with primary care provider if you do not get resolution with the course of treatment.  Return to walk-in care if complication or new symptoms arise in the interim.

## 2022-06-28 NOTE — PROGRESS NOTES
ANTICOAGULATION MANAGEMENT     Donato De Leon 52 year old male is on warfarin with subtherapeutic INR result. (Goal INR 2.0-3.0)    Recent labs: (last 7 days)     06/28/22  0000   INR 1.9*       ASSESSMENT       Source(s): Chart Review and Patient/Caregiver Call       Warfarin doses taken: Warfarin taken as instructed    Diet: No new diet changes identified    New illness, injury, or hospitalization: Yes: Left foot pain, plantar fasciitis seen on 6/25/22 and 6/28/22    Medication/supplement changes: Prednisone 60 mg daily x5 days started on 6/28/22 which has potential for interaction; increasing INR    Prednisone 40 mg daily x7 days started on 6/25/22 which has potential for interaction; increasing INR (will finish out this dose after 60 mg days are complete)    Signs or symptoms of bleeding or clotting: No    Previous INR: Therapeutic last 2(+) visits    Additional findings: None       PLAN     Recommended plan for temporary change(s) affecting INR     Dosing Instructions: continue your current warfarin dose with next INR in 3 days       Summary  As of 6/28/2022    Full warfarin instructions:  5 mg every Mon; 10 mg all other days   Next INR check:  7/1/2022             Telephone call with Jerry who verbalizes understanding and agrees to plan    Patient to recheck with home meter    Education provided: Importance of following up at instructed interval, Potential interaction between warfarin and Prednisone, Monitoring for bleeding signs and symptoms and Contact [phone] with any changes, questions or concerns.     Plan made per [facility] anticoagulation protocol    Lizbet Mack RN  Anticoagulation Clinic  6/28/2022    _______________________________________________________________________     Anticoagulation Episode Summary     Current INR goal:  2.0-3.0   TTR:  79.1 % (1 y)   Target end date:  Indefinite   Send INR reminders to:  [facility]CHOLA    Indications    Deep vein thrombosis (DVT) (H) [I82.409] (Resolved)  [I82.409]  Long-term (current) use of anticoagulants [Z79.01] [Z79.01]  Deep vein thrombosis (DVT) of left lower extremity  unspecified chronicity  unspecified vein (H) [I82.402]  Ulcerative colitis with complication  unspecified location (H) [K51.919]  Acute deep vein thrombosis (DVT) of right lower extremity  unspecified vein (H) (Resolved) [I82.401]           Comments:  [employer] [facility]R Monitoring patient. Monitor by exception. OK to leave DVM.         Anticoagulation Care Providers     Provider Role Specialty Phone number    Crater Skinner PA-C Referring Family Medicine [phone]

## 2022-06-28 NOTE — PROGRESS NOTES
ANTICOAGULATION     Donato De Leon is overdue for INR check.      Left message reminding patient to check INR with their home meter and call results to the home monitoring company as soon as possible.     Annette Preciado RN

## 2022-07-05 ENCOUNTER — OFFICE VISIT (OUTPATIENT)
Dept: FAMILY MEDICINE | Facility: CLINIC | Age: 52
End: 2022-07-05
Payer: COMMERCIAL

## 2022-07-05 VITALS
OXYGEN SATURATION: 97 % | WEIGHT: 260.8 LBS | SYSTOLIC BLOOD PRESSURE: 114 MMHG | HEART RATE: 98 BPM | TEMPERATURE: 98.2 F | DIASTOLIC BLOOD PRESSURE: 82 MMHG | RESPIRATION RATE: 16 BRPM | BODY MASS INDEX: 34.41 KG/M2

## 2022-07-05 DIAGNOSIS — M25.572 PAIN IN JOINT INVOLVING ANKLE AND FOOT, LEFT: ICD-10-CM

## 2022-07-05 DIAGNOSIS — M79.673 HEEL PAIN, UNSPECIFIED LATERALITY: Primary | ICD-10-CM

## 2022-07-05 DIAGNOSIS — K51.919 ULCERATIVE COLITIS WITH COMPLICATION, UNSPECIFIED LOCATION (H): ICD-10-CM

## 2022-07-05 PROCEDURE — 99213 OFFICE O/P EST LOW 20 MIN: CPT | Performed by: FAMILY MEDICINE

## 2022-07-05 RX ORDER — PREDNISONE 20 MG/1
TABLET ORAL
Qty: 16 TABLET | Refills: 1 | Status: SHIPPED | OUTPATIENT
Start: 2022-07-05 | End: 2022-08-19

## 2022-07-05 ASSESSMENT — PAIN SCALES - GENERAL: PAINLEVEL: NO PAIN (0)

## 2022-07-05 NOTE — PROGRESS NOTES
ASSESSMENT / PLAN:  (M79.370) Heel pain, unspecified laterality  (primary encounter diagnosis)  Comment: likely tendonitis  Plan: Orthopedic  Referral, predniSONE         (DELTASONE) 20 MG tablet        Reveiwed risks and side effects of medication  Heat and lots of calf stretching. Follow-up podiatry- patient will call his old podiatrist first.   Expected course and warning signs reviewed. Call/email with questions/concerns     (M15.108) Ulcerative colitis with complication, unspecified location (H)  Comment: stable  Plan: per GI    (M25.572) Pain in joint involving ankle and foot, left  Comment: see above. Possible gout/inflammatory arthritis too.   Plan: predniSONE (DELTASONE) 20 MG tablet        Prednisone helpful.   Consider rheumatology if ongoing/more issues.         Subjective   Jerry is a 52 year old presenting for the following health issues:  Foot pain. Seen in clinic x2 last week and normal xray. Had crp/esr/uric acid and cbc done - all normal except wbc 12. Given prednisone.   No major injury. Prednisone was helpful. Needed higher dosage.   Was doing yard work day before.  History gout last 5 years ago. Had in big toe in past.    History plantar fascitis in past - had injections.   History ulcerative colitis -  Stable on infusions.   No stretching. Doing exercise bike.   Water intake good. No issues insomnia. Emotionally doing ok. No history ucler.   Not seen podiatry.   No chief complaint on file.      HPI     ED/UC Followup:  Facility:  [facility]  Date of visit: 6/25/2022 & 6/28/2022  Reason for visit: Left foot pain  Current Status: better but not resolved        Review of Systems         Objective    There were no vitals taken for this visit.  There is no height or weight on file to calculate BMI.   /82   Pulse 98   Temp 98.2  F (36.8  C) (Oral)   Resp 16   Wt 118.3 kg (260 lb 12.8 oz)   SpO2 97%   BMI 34.41 kg/m     Physical Exam   GENERAL: healthy,  alert and no distress  RESP: lungs clear to auscultation - no rales, rhonchi or wheezes  CV: regular rate and rhythm, normal S1 S2, no S3 or S4, no murmur, click or rub, no peripheral edema and peripheral pulses strong  MS: no gross musculoskeletal defects noted, no edema  MS: mild swelling ankle and tenderness heel. Some pain with forced flexion and RANGE OF MOTION ankle  SKIN: no suspicious lesions or rashes  NEURO: Normal strength and tone, mentation intact and speech normal  PSYCH: mentation appears normal, affect normal/bright

## 2022-07-11 ENCOUNTER — DOCUMENTATION ONLY (OUTPATIENT)
Dept: ANTICOAGULATION | Facility: CLINIC | Age: 52
End: 2022-07-11

## 2022-07-11 ENCOUNTER — ANTICOAGULATION THERAPY VISIT (OUTPATIENT)
Dept: ANTICOAGULATION | Facility: CLINIC | Age: 52
End: 2022-07-11

## 2022-07-11 DIAGNOSIS — Z79.01 LONG TERM CURRENT USE OF ANTICOAGULANT THERAPY: Primary | ICD-10-CM

## 2022-07-11 DIAGNOSIS — I82.402 DEEP VEIN THROMBOSIS (DVT) OF LEFT LOWER EXTREMITY, UNSPECIFIED CHRONICITY, UNSPECIFIED VEIN (H): ICD-10-CM

## 2022-07-11 DIAGNOSIS — K51.919 ULCERATIVE COLITIS WITH COMPLICATION, UNSPECIFIED LOCATION (H): ICD-10-CM

## 2022-07-11 LAB — INR HOME MONITORING: 5.3 (ref 2–3)

## 2022-07-11 NOTE — PROGRESS NOTES
ANTICOAGULATION MANAGEMENT     Donato De Leon 52 year old male is on warfarin with supratherapeutic INR result. (Goal INR 2.0-3.0)    Recent labs: (last 7 days)     07/11/22  0000   INR 5.3*       ASSESSMENT       Source(s): Chart Review and Patient/Caregiver Call       Warfarin doses taken: Warfarin taken as instructed    Diet: No new diet changes identified    New illness, injury, or hospitalization: Yes: Heel Pain    Medication/supplement changes: Prednisone tapering dose    Signs or symptoms of bleeding or clotting: No    Previous INR: Subtherapeutic    Additional findings: 20 mg Tablets of Prednisone (3 tabs x2 days, then 2 tabs x3 days, then 1 tab x4 days. Take with food) 7/5-7/14       PLAN     Recommended plan for temporary change(s) affecting INR     Dosing Instructions: hold dose then continue your current warfarin dose with next INR in 2 days   / already took coumadin dose for today in AM so will hold dose tomorrow    Summary  As of 7/11/2022    Full warfarin instructions:  7/12: Hold; Otherwise 5 mg every Mon; 10 mg all other days   Next INR check:  7/13/2022             Telephone call with Jerry who verbalizes understanding and agrees to plan    Patient to recheck with home meter    Education provided: Please call back if any changes to your diet, medications or how you've been taking warfarin, Monitoring for bleeding signs and symptoms, Monitoring for clotting signs and symptoms and When to seek medical attention/emergency care    Plan made per [facility] anticoagulation protocol    Eliza Donovan, RN  Anticoagulation Clinic  7/11/2022    _______________________________________________________________________     Anticoagulation Episode Summary     Current INR goal:  2.0-3.0   TTR:  76.6 % (1 y)   Target end date:  Indefinite   Send INR reminders to:  [facility]EL CANCHOLA    Indications    Deep vein thrombosis (DVT) (H) [I82.409] (Resolved) [I82.409]  Long-term (current) use of anticoagulants [Z79.01]  [Z79.01]  Deep vein thrombosis (DVT) of left lower extremity  unspecified chronicity  unspecified vein (H) [I82.402]  Ulcerative colitis with complication  unspecified location (H) [K51.919]  Acute deep vein thrombosis (DVT) of right lower extremity  unspecified vein (H) (Resolved) [I82.401]           Comments:  [employer] [facility] INR Monitoring patient. Monitor by exception. OK to leave DVM.         Anticoagulation Care Providers     Provider Role Specialty Phone number    Carter Skinner PA-C Referring Family Medicine [phone]

## 2022-07-14 ENCOUNTER — ANTICOAGULATION THERAPY VISIT (OUTPATIENT)
Dept: ANTICOAGULATION | Facility: CLINIC | Age: 52
End: 2022-07-14

## 2022-07-14 LAB — INR HOME MONITORING: 1.8 (ref 2–3)

## 2022-07-14 NOTE — PROGRESS NOTES
ANTICOAGULATION MANAGEMENT     Donato De Leon 52 year old male is on warfarin with subtherapeutic INR result. (Goal INR 2.0-3.0)    Recent labs: (last 7 days)     07/14/22  0000   INR 1.8*       ASSESSMENT       Source(s): Chart Review    Previous INR was Supratherapeutic     Tried multiple times to call pt but unable to leave a voicemail. [employer] message sent    It looks like plantar fasciitis /left foot pain and prednisone could be the reason that pt's INR was elevated on 7/11/22. Today 7/14/22 should be the last day of prednisone. Hopefully, pt's heel is feeling better after treatment.    Pt to respond by [employer] or call back if questions, updates or concerns.           PLAN         Dosing Instructions: continue your current warfarin dose with next INR in 5 days       Summary  As of 7/14/2022    Full warfarin instructions:  5 mg every Mon; 10 mg all other days   Next INR check:  7/19/2022             Sent [employer] message with dosing and follow up instructions    Patient to recheck with home meter    Education provided: Importance of notifying clinic for changes in medications; a sooner lab recheck maybe needed. and Contact [phone]  with any changes, questions or concerns.     Plan made per [facility] anticoagulation protocol    Kim Carbajal RN  Anticoagulation Clinic  7/14/2022    _______________________________________________________________________     Anticoagulation Episode Summary     Current INR goal:  2.0-3.0   TTR:  76.8 % (1 y)   Target end date:  Indefinite   Send INR reminders to:  [facility] CANCHOLA    Indications    Deep vein thrombosis (DVT) (H) [I82.409] (Resolved) [I82.409]  Long-term (current) use of anticoagulants [Z79.01] [Z79.01]  Deep vein thrombosis (DVT) of left lower extremity  unspecified chronicity  unspecified vein (H) [I82.402]  Ulcerative colitis with complication  unspecified location (H) [K51.919]  Acute deep vein thrombosis (DVT) of right lower extremity  unspecified vein (H)  (Resolved) [I82.401]           Comments:  [employer] [facility] INR Monitoring patient. Monitor by exception. OK to leave DVM.         Anticoagulation Care Providers     Provider Role Specialty Phone number    Carter Skinner PA-C Referring Family Medicine [phone]

## 2022-07-25 ENCOUNTER — TELEPHONE (OUTPATIENT)
Dept: ANTICOAGULATION | Facility: CLINIC | Age: 52
End: 2022-07-25

## 2022-07-25 NOTE — TELEPHONE ENCOUNTER
ANTICOAGULATION     Donato EUGENE Kevinkirk is overdue for INR check.      Was unable to reach patient and was unable to leave a voicemail. If patient calls, please schedule INR check as soon as possible.     Kiran Escalona RN

## 2022-08-01 ENCOUNTER — TELEPHONE (OUTPATIENT)
Dept: ANTICOAGULATION | Facility: CLINIC | Age: 52
End: 2022-08-01

## 2022-08-01 NOTE — TELEPHONE ENCOUNTER
ANTICOAGULATION     Donato EUGENE De Leon is overdue for INR check.      Left message reminding patient to check INR with their home meter and call results to the home monitoring company as soon as possible.     Annette Alexis RN

## 2022-08-02 ENCOUNTER — ANTICOAGULATION THERAPY VISIT (OUTPATIENT)
Dept: ANTICOAGULATION | Facility: CLINIC | Age: 52
End: 2022-08-02

## 2022-08-02 DIAGNOSIS — I82.402 DEEP VEIN THROMBOSIS (DVT) OF LEFT LOWER EXTREMITY, UNSPECIFIED CHRONICITY, UNSPECIFIED VEIN (H): ICD-10-CM

## 2022-08-02 DIAGNOSIS — K51.919 ULCERATIVE COLITIS WITH COMPLICATION, UNSPECIFIED LOCATION (H): ICD-10-CM

## 2022-08-02 DIAGNOSIS — Z79.01 LONG TERM CURRENT USE OF ANTICOAGULANT THERAPY: Primary | ICD-10-CM

## 2022-08-02 LAB — INR HOME MONITORING: 3.5 (ref 2–3)

## 2022-08-02 NOTE — PROGRESS NOTES
ANTICOAGULATION MANAGEMENT     Donato De Leon 52 year old male is on warfarin with supratherapeutic INR result. (Goal INR 2.0-3.0)    Recent labs: (last 7 days)     08/02/22  0000   INR 3.5*       ASSESSMENT       Source(s): Chart Review and Patient/Caregiver Call       Warfarin doses taken: Warfarin taken as instructed    Diet: No new diet changes identified    New illness, injury, or hospitalization: Yes: still having issues with plantar fasciitis.     Medication/supplement changes: finished another round of prednisone last week.    Signs or symptoms of bleeding or clotting: No    Previous INR: Subtherapeutic    Additional findings: He will be seeing podiatrist on Friday of this week.       PLAN     Recommended plan for temporary change(s) affecting INR     Dosing Instructions: partial hold then continue your current warfarin dose with next INR in 1-2 weeks       Summary  As of 8/2/2022    Full warfarin instructions:  8/2: 5 mg; Otherwise 5 mg every Mon; 10 mg all other days   Next INR check:  8/16/2022             Telephone call with Jerry who verbalizes understanding and agrees to plan    Patient to recheck with home meter    Education provided: Please call back if any changes to your diet, medications or how you've been taking warfarin, Goal range and significance of current result and Importance of therapeutic range    Plan made per [facility] anticoagulation protocol    Lu Aiken RN  Anticoagulation Clinic  8/2/2022    _______________________________________________________________________     Anticoagulation Episode Summary     Current INR goal:  2.0-3.0   TTR:  78.4 % (1 y)   Target end date:  Indefinite   Send INR reminders to:  [facility]CH[facility]A    Indications    Deep vein thrombosis (DVT) (H) [I82.409] (Resolved) [I82.409]  Long-term (current) use of anticoagulants [Z79.01] [Z79.01]  Deep vein thrombosis (DVT) of left lower extremity  unspecified chronicity  unspecified vein (H) [I82.402]  Ulcerative  colitis with complication  unspecified location (H) [K55.052]  Acute deep vein thrombosis (DVT) of right lower extremity  unspecified vein (H) (Resolved) [I82.401]           Comments:  [employer] [facility]R Monitoring patient. Monitor by exception. OK to leave DVM.         Anticoagulation Care Providers     Provider Role Specialty Phone number    Carter Skinner PA-C Referring [facility] [phone]

## 2022-08-05 ENCOUNTER — OFFICE VISIT (OUTPATIENT)
Dept: PODIATRY | Facility: CLINIC | Age: 52
End: 2022-08-05
Attending: FAMILY MEDICINE
Payer: COMMERCIAL

## 2022-08-05 VITALS
SYSTOLIC BLOOD PRESSURE: 132 MMHG | WEIGHT: 260 LBS | HEIGHT: 73 IN | BODY MASS INDEX: 34.46 KG/M2 | DIASTOLIC BLOOD PRESSURE: 100 MMHG | HEART RATE: 79 BPM

## 2022-08-05 DIAGNOSIS — M79.673 HEEL PAIN, UNSPECIFIED LATERALITY: ICD-10-CM

## 2022-08-05 DIAGNOSIS — M72.2 PLANTAR FASCIITIS, LEFT: Primary | ICD-10-CM

## 2022-08-05 DIAGNOSIS — B35.1 PAIN DUE TO ONYCHOMYCOSIS OF TOENAILS OF BOTH FEET: ICD-10-CM

## 2022-08-05 DIAGNOSIS — M79.675 PAIN DUE TO ONYCHOMYCOSIS OF TOENAILS OF BOTH FEET: ICD-10-CM

## 2022-08-05 DIAGNOSIS — M79.674 PAIN DUE TO ONYCHOMYCOSIS OF TOENAILS OF BOTH FEET: ICD-10-CM

## 2022-08-05 DIAGNOSIS — M76.62 TENDONITIS, ACHILLES, LEFT: ICD-10-CM

## 2022-08-05 PROCEDURE — 99203 OFFICE O/P NEW LOW 30 MIN: CPT | Performed by: PODIATRIST

## 2022-08-05 RX ORDER — CICLOPIROX 80 MG/ML
SOLUTION TOPICAL DAILY
Qty: 6.6 ML | Refills: 1 | Status: SHIPPED | OUTPATIENT
Start: 2022-08-05 | End: 2023-10-18

## 2022-08-05 ASSESSMENT — PAIN SCALES - GENERAL: PAINLEVEL: MODERATE PAIN (4)

## 2022-08-05 NOTE — NURSING NOTE
"Chief Complaint   Patient presents with     Consult     Left heel pain       Initial BP (!) 154/110   Pulse 79   Ht 1.854 m (6' 1\")   Wt 117.9 kg (260 lb)   BMI 34.30 kg/m   Estimated body mass index is 34.3 kg/m  as calculated from the following:    Height as of this encounter: 1.854 m (6' 1\").    Weight as of this encounter: 117.9 kg (260 lb).  Medications and allergies reviewed.      Marisa JIMENEZ MA    "

## 2022-08-05 NOTE — PROGRESS NOTES
PATIENT HISTORY:  Donato De Leon is a 52 year old male who presents to clinic in consultation at the request of  Willie Cummings M.D. with a chief complaint of chronic left heel pain.  The patient is seen by themselves.  The patient relates the pain is primarily located around the bottom of heel into the back.  Reports insidious onset without acute precipitating event. that has been going on for 1 year(s).  The patient has previously tried orthotics with little relief.  Prior history of similar pain/issues ~ 12 years ago..  The patient is currently employed as sales.  Any previous notes and studies that pertain to the patient's condition were reviewed.         Past Medical History:   Past Medical History:   Diagnosis Date     Acute gout of right foot, unspecified cause 5/3/2017     Colitis, ulcerative (H)      Deep vein thrombosis (DVT) of left lower extremity, unspecified chronicity, unspecified vein (H) 7/11/2017     Long-term (current) use of anticoagulants [Z79.01] 3/29/2017     Renal lithiasis        Medications:   Current Outpatient Medications:      Acetaminophen (TYLENOL PO), Take by mouth as needed for mild pain or fever, Disp: , Rfl:      atorvastatin (LIPITOR) 40 MG tablet, TAKE 1 TABLET DAILY, Disp: 90 tablet, Rfl: 2     ciclopirox (PENLAC) 8 % external solution, Apply topically daily Apply to affected nails daily.  Remove the residual build up weekly with nail polish remover or an [place] board., Disp: 6.6 mL, Rfl: 1     vedolizumab (ENTYVIO) 60 MG/ML injection, , Disp: , Rfl:      predniSONE (DELTASONE) 20 MG tablet, TAKE THREE TABLETS (60MG) BY MOUTH ONCE DAILY FOR 2 DAYS, THEN TAKE TWO TABLETS (40MG) ONCE DAILY FOR 3 DAYS, THEN TAKE ONE TABLET(20MG) ONCE DAILY FOR 4 DAYS, Disp: 16 tablet, Rfl: 0     warfarin ANTICOAGULANT (COUMADIN) 5 MG tablet, TAKE 5 MG EVERY MONDAY, THEN 10 MG ALL OTHER DAYS OR AS DIRECTED BY INR CLINIC, Disp: 165 tablet, Rfl: 1     Allergies:    Allergies   Allergen Reactions  "    Nkda [No Known Drug Allergies]        Vitals: BP (!) 132/100   Pulse 79   Ht 1.854 m (6' 1\")   Wt 117.9 kg (260 lb)   BMI 34.30 kg/m    BMI= Body mass index is 34.3 kg/m .    LOWER EXTREMITY PHYSICAL EXAM    Dermatologic: Skin is intact to left lower extremity without significant lesions, rash or abrasion.  Noted hypertrophic and discolored toenails on both feet.        Vascular: DP & PT pulses are intact & regular on the left.   CFT and skin temperature is normal to the left lower extremity.     Neurologic: Lower extremity sensation is intact to light touch.  No evidence of weakness in the left lower extremity.        Musculoskeletal: Patient is ambulatory without assistive device or brace.  No gross ankle deformity noted.  No foot or ankle joint effusion is noted.  Noted pain on palpation over the posterior and plantar aspect of the left heel near the insertion point of the Achilles tendon and plantar fascia respectively.  Noted tight gastroc complex on the left lower extremity.    Diagnostics: Recent radiographs included three views of the left foot and ankle demonstrating calcaneal spurring.  No cortical erosions or periosteal elevation.  All joint margins appear stable.  There is no apparent fracture or tumor formation noted.  There is no evidence of foreign body.  The images were independently reviewed by myself along with the patient explaining the findings.      ASSESSMENT / PLAN:     ICD-10-CM    1. Plantar fasciitis, left  M72.2 Ankle/Foot Bracing Supplies DME   2. Heel pain, unspecified laterality  M79.673 Orthopedic  Referral   3. Pain due to onychomycosis of toenails of both feet  B35.1 ciclopirox (PENLAC) 8 % external solution    M79.675     M79.674    4. Tendonitis, Achilles, left  M76.62 Ankle/Foot Bracing Supplies DME       I have explained to Donato about the conditions.  We discussed the underlying contributing factors to the condition as well as treatment options along with " expected length of recovery.  At this time, the patient was educated on the importance of offloading supportive shoes and other devices.  I demonstrated to the patient calf stretches to perform every hour daily until symptoms resolve.  After symptoms resolve, the patient was advised to perform the stretches 3 times daily to prevent future recurrence.  The patient was instructed to perform warm soaks with Epson salt after which to also apply over-the-counter Voltaren gel to deeply massage the injured tissue.  The patient was instructed to do this on a daily basis until symptoms resolve.   The patient was fitted with a quarter inch heel lift along with a Tri-Lock ankle brace that will aid in offloading the tension forces to the soft tissues and prevent further inflammation.  The patient was prescribed topical Penlac antifungal medication to be applied to the affected toenails on a daily basis for up to 6 to 9 months.  The patient will return in four weeks for reevaluation and to determine if any further treatment will be needed.      Donato verbalized agreement with and understanding of the rational for the diagnosis and treatment plan.  All questions were answered to best of my ability and the patient's satisfaction. The patient was advised to contact the clinic with any questions that may arise after the clinic visit.      Disclaimer: This note consists of symbols derived from keyboarding, dictation and/or voice recognition software. As a result, there may be errors in the script that have gone undetected. Please consider this when interpreting information found in this chart.       DANIELLE Duenas D.P.M., TRISTON.F.A.S.

## 2022-08-05 NOTE — LETTER
8/5/2022         RE: Donato De Leon  [address]  [place] [address]        Dear Colleague,    Thank you for referring your patient, Donato De Leon, to the [facility] [place]. Please see a copy of my visit note below.    PATIENT HISTORY:  Donato De Leon is a 52 year old male who presents to clinic in consultation at the request of  Willie Cummings M.D. with a chief complaint of chronic left heel pain.  The patient is seen by themselves.  The patient relates the pain is primarily located around the bottom of heel into the back.  Reports insidious onset without acute precipitating event. that has been going on for 1 year(s).  The patient has previously tried orthotics with little relief.  Prior history of similar pain/issues ~ 12 years ago..  The patient is currently employed as sales.  Any previous notes and studies that pertain to the patient's condition were reviewed.         Past Medical History:   Past Medical History:   Diagnosis Date     Acute gout of right foot, unspecified cause 5/3/2017     Colitis, ulcerative (H)      Deep vein thrombosis (DVT) of left lower extremity, unspecified chronicity, unspecified vein (H) 7/11/2017     Long-term (current) use of anticoagulants [Z79.01] 3/29/2017     Renal lithiasis        Medications:   Current Outpatient Medications:      Acetaminophen (TYLENOL PO), Take by mouth as needed for mild pain or fever, Disp: , Rfl:      atorvastatin (LIPITOR) 40 MG tablet, TAKE 1 TABLET DAILY, Disp: 90 tablet, Rfl: 2     ciclopirox (PENLAC) 8 % external solution, Apply topically daily Apply to affected nails daily.  Remove the residual build up weekly with nail polish remover or an [place] board., Disp: 6.6 mL, Rfl: 1     vedolizumab (ENTYVIO) 60 MG/ML injection, , Disp: , Rfl:      predniSONE (DELTASONE) 20 MG tablet, TAKE THREE TABLETS (60MG) BY MOUTH ONCE DAILY FOR 2 DAYS, THEN TAKE TWO TABLETS (40MG) ONCE DAILY FOR 3 DAYS, THEN TAKE  "ONE TABLET(20MG) ONCE DAILY FOR 4 DAYS, Disp: 16 tablet, Rfl: 0     warfarin ANTICOAGULANT (COUMADIN) 5 MG tablet, TAKE 5 MG EVERY MONDAY, THEN 10 MG ALL OTHER DAYS OR AS DIRECTED BY INR CLINIC, Disp: 165 tablet, Rfl: 1     Allergies:    Allergies   Allergen Reactions     Nkda [No Known Drug Allergies]        Vitals: BP (!) 132/100   Pulse 79   Ht 1.854 m (6' 1\")   Wt 117.9 kg (260 lb)   BMI 34.30 kg/m    BMI= Body mass index is 34.3 kg/m .    LOWER EXTREMITY PHYSICAL EXAM    Dermatologic: Skin is intact to left lower extremity without significant lesions, rash or abrasion.  Noted hypertrophic and discolored toenails on both feet.        Vascular: DP & PT pulses are intact & regular on the left.   CFT and skin temperature is normal to the left lower extremity.     Neurologic: Lower extremity sensation is intact to light touch.  No evidence of weakness in the left lower extremity.        Musculoskeletal: Patient is ambulatory without assistive device or brace.  No gross ankle deformity noted.  No foot or ankle joint effusion is noted.  Noted pain on palpation over the posterior and plantar aspect of the left heel near the insertion point of the Achilles tendon and plantar fascia respectively.  Noted tight gastroc complex on the left lower extremity.    Diagnostics: Recent radiographs included three views of the left foot and ankle demonstrating calcaneal spurring.  No cortical erosions or periosteal elevation.  All joint margins appear stable.  There is no apparent fracture or tumor formation noted.  There is no evidence of foreign body.  The images were independently reviewed by myself along with the patient explaining the findings.      ASSESSMENT / PLAN:     ICD-10-CM    1. Plantar fasciitis, left  M72.2 Ankle/Foot Bracing Supplies DME   2. Heel pain, unspecified laterality  M79.673 Orthopedic  Referral   3. Pain due to onychomycosis of toenails of both feet  B35.1 ciclopirox (PENLAC) 8 % external " solution    M79.675     M79.674    4. Tendonitis, Achilles, left  M76.62 Ankle/Foot Bracing Supplies DME       I have explained to Donato about the conditions.  We discussed the underlying contributing factors to the condition as well as treatment options along with expected length of recovery.  At this time, the patient was educated on the importance of offloading supportive shoes and other devices.  I demonstrated to the patient calf stretches to perform every hour daily until symptoms resolve.  After symptoms resolve, the patient was advised to perform the stretches 3 times daily to prevent future recurrence.  The patient was instructed to perform warm soaks with Epson salt after which to also apply over-the-counter Voltaren gel to deeply massage the injured tissue.  The patient was instructed to do this on a daily basis until symptoms resolve.   The patient was fitted with a quarter inch heel lift along with a Tri-Lock ankle brace that will aid in offloading the tension forces to the soft tissues and prevent further inflammation.  The patient was prescribed topical Penlac antifungal medication to be applied to the affected toenails on a daily basis for up to 6 to 9 months.  The patient will return in four weeks for reevaluation and to determine if any further treatment will be needed.      Donato verbalized agreement with and understanding of the rational for the diagnosis and treatment plan.  All questions were answered to best of my ability and the patient's satisfaction. The patient was advised to contact the clinic with any questions that may arise after the clinic visit.      Disclaimer: This note consists of symbols derived from keyboarding, dictation and/or voice recognition software. As a result, there may be errors in the script that have gone undetected. Please consider this when interpreting information found in this chart.       DANIELLE Duenas D.P.M., F.ANTON.C.F.A.S.        Again, thank you for  allowing me to participate in the care of your patient.        Sincerely,        Kadeem Duenas DPM

## 2022-08-05 NOTE — PATIENT INSTRUCTIONS

## 2022-08-10 ENCOUNTER — TRANSFERRED RECORDS (OUTPATIENT)
Dept: HEALTH INFORMATION MANAGEMENT | Facility: CLINIC | Age: 52
End: 2022-08-10

## 2022-08-17 DIAGNOSIS — I82.402 DEEP VEIN THROMBOSIS (DVT) OF LEFT LOWER EXTREMITY, UNSPECIFIED CHRONICITY, UNSPECIFIED VEIN (H): ICD-10-CM

## 2022-08-17 RX ORDER — WARFARIN SODIUM 5 MG/1
TABLET ORAL
Qty: 165 TABLET | Refills: 1 | Status: SHIPPED | OUTPATIENT
Start: 2022-08-17 | End: 2023-02-15

## 2022-08-17 NOTE — TELEPHONE ENCOUNTER
ANTICOAGULATION MANAGEMENT:  Medication Refill    Anticoagulation Summary  As of 8/2/2022    Warfarin maintenance plan:  5 mg (5 mg x 1) every Mon; 10 mg (5 mg x 2) all other days   Next INR check:  8/16/2022   Target end date:  Indefinite    Indications    Deep vein thrombosis (DVT) (H) [I82.409] (Resolved) [I82.409]  Long-term (current) use of anticoagulants [Z79.01] [Z79.01]  Deep vein thrombosis (DVT) of left lower extremity  unspecified chronicity  unspecified vein (H) [I82.402]  Ulcerative colitis with complication  unspecified location (H) [K51.919]  Acute deep vein thrombosis (DVT) of right lower extremity  unspecified vein (H) (Resolved) [I82.401]             Anticoagulation Care Providers     Provider Role Specialty Phone number    Carter Skinner PA-C Referring Family Medicine [phone]          Visit with referring provider/group within last year: Yes    [facility] referral signed within last year: Yes    Donato meets all criteria for refill (current [facility] referral, office visit with referring provider/group in last year, lab monitoring up to date or not exceeding 2 weeks overdue). Rx instructions and quantity match patient's current dosing plan. Warfarin 90 day supply with 1 refill granted per [facility] protocol     Ghada Boateng RN  Anticoagulation Clinic

## 2022-08-19 ENCOUNTER — TELEPHONE (OUTPATIENT)
Dept: NURSING | Facility: CLINIC | Age: 52
End: 2022-08-19

## 2022-08-19 ENCOUNTER — NURSE TRIAGE (OUTPATIENT)
Dept: NURSING | Facility: CLINIC | Age: 52
End: 2022-08-19

## 2022-08-19 DIAGNOSIS — M25.572 PAIN IN JOINT INVOLVING ANKLE AND FOOT, LEFT: ICD-10-CM

## 2022-08-19 DIAGNOSIS — M79.673 HEEL PAIN, UNSPECIFIED LATERALITY: ICD-10-CM

## 2022-08-19 RX ORDER — PREDNISONE 20 MG/1
TABLET ORAL
Qty: 16 TABLET | Refills: 0 | Status: SHIPPED | OUTPATIENT
Start: 2022-08-19 | End: 2022-09-09

## 2022-08-19 NOTE — TELEPHONE ENCOUNTER
Patient called again.  Says he is still waiting for refill of prednisone.  Would like to pick this up before pharmacy closes today.    Krystal Guerra RN  Triage Nurse Advisor

## 2022-08-19 NOTE — TELEPHONE ENCOUNTER
Patient requesting prednisone taper for plantar fascitis. Refill sent to provider to advise.     Christen Moore RN 08/19/22 8:52 AM   [facility] Triage Nurse Advisor

## 2022-08-19 NOTE — TELEPHONE ENCOUNTER
Patient calling reporting a plantar fascitis flare, reports this has been helpful in the past. Requesting refill sent to pharmacy. Please advise.     Routing refill request to provider for review/approval because:  Drug not on the [facility] refill protocol     Last Written Prescription Date:  7/5/22  Last Fill Quantity: 16,  # refills: 1   Last office visit provider:   7/5/22    Requested Prescriptions   Pending Prescriptions Disp Refills     predniSONE (DELTASONE) 20 MG tablet [Pharmacy Med Name: PREDNISONE 20MG TABS] 16 tablet 1     Sig: TAKE THREE TABLETS (60MG) BY MOUTH ONCE DAILY FOR 2 DAYS, THEN TAKE TWO TABLETS (40MG) ONCE DAILY FOR 3 DAYS, THEN TAKE ONE TABLET(20MG) ONCE DAILY FOR 4 DAYS       There is no refill protocol information for this order          Christen Moore RN 08/19/22 8:47 AM

## 2022-08-19 NOTE — TELEPHONE ENCOUNTER
Patient calling back again about refill request.  See refill note.  Krystal Guerra, RN  Triage Nurse Advisor'

## 2022-08-24 ENCOUNTER — DOCUMENTATION ONLY (OUTPATIENT)
Dept: ANTICOAGULATION | Facility: CLINIC | Age: 52
End: 2022-08-24

## 2022-08-24 ENCOUNTER — ANTICOAGULATION THERAPY VISIT (OUTPATIENT)
Dept: ANTICOAGULATION | Facility: CLINIC | Age: 52
End: 2022-08-24

## 2022-08-24 DIAGNOSIS — I82.402 DEEP VEIN THROMBOSIS (DVT) OF LEFT LOWER EXTREMITY, UNSPECIFIED CHRONICITY, UNSPECIFIED VEIN (H): ICD-10-CM

## 2022-08-24 DIAGNOSIS — Z79.01 LONG TERM CURRENT USE OF ANTICOAGULANT THERAPY: Primary | ICD-10-CM

## 2022-08-24 DIAGNOSIS — K51.919 ULCERATIVE COLITIS WITH COMPLICATION, UNSPECIFIED LOCATION (H): ICD-10-CM

## 2022-08-24 LAB — INR HOME MONITORING: 2.7 (ref 2–3)

## 2022-08-24 NOTE — PROGRESS NOTES
ANTICOAGULATION MANAGEMENT     Donato De Leon 52 year old male is on warfarin with therapeutic INR result. (Goal INR 2.0-3.0)    Recent labs: (last 7 days)     08/24/22  0000   INR 2.7       ASSESSMENT       Source(s): Chart Review    Previous INR was Supratherapeutic    Medication, diet, health changes since last INR predinsone taper will finish on 9/27/2022           PLAN     Recommended plan for temporary change(s) affecting INR     Dosing Instructions: Continue your current warfarin dose with next INR in 2 weeks       Summary  As of 8/24/2022    Full warfarin instructions:  5 mg every Mon; 10 mg all other days   Next INR check:  9/7/2022             Detailed voice message left for Jerry with dosing instructions and follow up date.     Patient to recheck with home meter    Education provided: Please call back if any changes to your diet, medications or how you've been taking warfarin, Goal range and significance of current result, Importance of therapeutic range, Importance of following up at instructed interval, Importance of taking warfarin as instructed and Contact [phone]  with any changes, questions or concerns.     Plan made per [facility] anticoagulation protocol    Daly Juan RN  Anticoagulation Clinic  8/24/2022    _______________________________________________________________________     Anticoagulation Episode Summary     Current INR goal:  2.0-3.0   TTR:  77.6 % (1 y)   Target end date:  Indefinite   Send INR reminders to:  [facility]    Indications    Deep vein thrombosis (DVT) (H) [I82.409] (Resolved) [I82.409]  Long-term (current) use of anticoagulants [Z79.01] [Z79.01]  Deep vein thrombosis (DVT) of left lower extremity  unspecified chronicity  unspecified vein (H) [I82.402]  Ulcerative colitis with complication  unspecified location (H) [K51.919]  Acute deep vein thrombosis (DVT) of right lower extremity  unspecified vein (H) (Resolved) [I82.401]           Comments:  [facility] INR  Monitoring patient. Monitor by exception. OK to leave DVM.         Anticoagulation Care Providers     Provider Role Specialty Phone number    Carter Skinner PA-C Referring Family Medicine [phone]

## 2022-09-01 DIAGNOSIS — I82.402 DEEP VEIN THROMBOSIS (DVT) OF LEFT LOWER EXTREMITY, UNSPECIFIED CHRONICITY, UNSPECIFIED VEIN (H): Primary | ICD-10-CM

## 2022-09-08 ENCOUNTER — ANTICOAGULATION THERAPY VISIT (OUTPATIENT)
Dept: ANTICOAGULATION | Facility: CLINIC | Age: 52
End: 2022-09-08

## 2022-09-08 DIAGNOSIS — Z79.01 LONG TERM CURRENT USE OF ANTICOAGULANT THERAPY: Primary | ICD-10-CM

## 2022-09-08 DIAGNOSIS — K51.919 ULCERATIVE COLITIS WITH COMPLICATION, UNSPECIFIED LOCATION (H): ICD-10-CM

## 2022-09-08 DIAGNOSIS — I82.402 DEEP VEIN THROMBOSIS (DVT) OF LEFT LOWER EXTREMITY, UNSPECIFIED CHRONICITY, UNSPECIFIED VEIN (H): ICD-10-CM

## 2022-09-08 LAB — INR HOME MONITORING: 3.7 (ref 2–3)

## 2022-09-08 NOTE — PROGRESS NOTES
ANTICOAGULATION MANAGEMENT     Donato De Leon 52 year old male is on warfarin with supratherapeutic INR result. (Goal INR 2.0-3.0)    Recent labs: (last 7 days)     09/08/22  0000   INR 3.7*       ASSESSMENT       Source(s): Chart Review and Patient/Caregiver Call       Warfarin doses taken: Warfarin taken as instructed    Diet: No new diet changes identified    New illness, injury, or hospitalization: No    Medication/supplement changes: just completed prednisone on 9/2    Signs or symptoms of bleeding or clotting: No    Previous INR: Therapeutic last visit; previously outside of goal range    Additional findings: None       PLAN     Recommended plan for temporary change(s) affecting INR     Dosing Instructions: partial hold then continue your current warfarin dose with next INR in 2 weeks       Summary  As of 9/8/2022    Full warfarin instructions:  9/8: 5 mg; Otherwise 5 mg every Mon; 10 mg all other days   Next INR check:  9/22/2022             Telephone call with Jerry who verbalizes understanding and agrees to plan    Patient to recheck with home meter    Education provided: Contact [phone]  with any changes, questions or concerns.     Plan made per [facility] anticoagulation protocol    Ghada Boateng RN  Anticoagulation Clinic  9/8/2022    _______________________________________________________________________     Anticoagulation Episode Summary     Current INR goal:  2.0-3.0   TTR:  78.1 % (1 y)   Target end date:  Indefinite   Send INR reminders to:  [facility]    Indications    Deep vein thrombosis (DVT) (H) [I82.409] (Resolved) [I82.409]  Long-term (current) use of anticoagulants [Z79.01] [Z79.01]  Deep vein thrombosis (DVT) of left lower extremity  unspecified chronicity  unspecified vein (H) [I82.402]  Ulcerative colitis with complication  unspecified location (H) [K51.919]  Acute deep vein thrombosis (DVT) of right lower extremity  unspecified vein (H) (Resolved) [I82.401]           Comments:   [facility] INR Monitoring patient. Monitor by exception. OK to leave DVM.         Anticoagulation Care Providers     Provider Role Specialty Phone number    Carter Skinner PA-C Referring Family Medicine [phone]

## 2022-09-09 ENCOUNTER — OFFICE VISIT (OUTPATIENT)
Dept: PODIATRY | Facility: CLINIC | Age: 52
End: 2022-09-09
Payer: COMMERCIAL

## 2022-09-09 VITALS
BODY MASS INDEX: 34.46 KG/M2 | WEIGHT: 260 LBS | DIASTOLIC BLOOD PRESSURE: 104 MMHG | HEART RATE: 89 BPM | SYSTOLIC BLOOD PRESSURE: 150 MMHG | HEIGHT: 73 IN

## 2022-09-09 DIAGNOSIS — M10.072 ACUTE IDIOPATHIC GOUT OF LEFT ANKLE: Primary | ICD-10-CM

## 2022-09-09 DIAGNOSIS — M76.62 TENDONITIS, ACHILLES, LEFT: ICD-10-CM

## 2022-09-09 DIAGNOSIS — M72.2 PLANTAR FASCIITIS, LEFT: ICD-10-CM

## 2022-09-09 PROCEDURE — 99213 OFFICE O/P EST LOW 20 MIN: CPT | Performed by: PODIATRIST

## 2022-09-09 RX ORDER — METHYLPREDNISOLONE 4 MG
4 TABLET, DOSE PACK ORAL SEE ADMIN INSTRUCTIONS
Qty: 21 TABLET | Refills: 0 | Status: SHIPPED | OUTPATIENT
Start: 2022-09-09 | End: 2022-09-19

## 2022-09-09 ASSESSMENT — PAIN SCALES - GENERAL: PAINLEVEL: MODERATE PAIN (5)

## 2022-09-09 NOTE — LETTER
"    9/9/2022         RE: Donato De Leon  [address] [place] [address]        Dear Colleague,    Thank you for referring your patient, Donato De Leon, to the [facility] [place]. Please see a copy of my visit note below.    Donato returns to the office for reevaluation of the left foot.  The patient relates following the instructions given at the last visit with noted overall more pain and less improvement in function of the left foot.   The patient relates no other problems.    Vitals: BP (!) 150/104   Pulse 89   Ht 1.854 m (6' 1\")   Wt 117.9 kg (260 lb)   BMI 34.30 kg/m    BMI= Body mass index is 34.3 kg/m .    Lower Extremity Physical Exam:      Neurovascular status remains unchanged.  Muscular exam is within normal limits to major muscle groups.  Integument is intact.      Noted pain on palpation on the plantar aspect and posterior aspect of the left heel near the insertion of the plantar fascia and Achilles tendon on the left.  Noted pain with ankle range of motion on the left.  No crepitus noted.    Diagnostics:  Recent radiograph evaluation including three views of the left foot and ankle reveals calcaneal spurring with no degenerative changes to the ankle joint.       Assessment:     ICD-10-CM    1. Acute idiopathic gout of left ankle  M10.072 methylPREDNISolone (MEDROL DOSEPAK) 4 MG tablet therapy pack   2. Plantar fasciitis, left  M72.2 Physical Therapy Referral     methylPREDNISolone (MEDROL DOSEPAK) 4 MG tablet therapy pack   3. Tendonitis, Achilles, left  M76.62 Physical Therapy Referral     methylPREDNISolone (MEDROL DOSEPAK) 4 MG tablet therapy pack       Plan:    I have explained to Donato about the progress of the conditions.  At this time, the patient was educated on the importance of offloading supportive shoes and other devices.  I demonstrated to the patient calf stretches to perform every hour daily until symptoms resolve.  After symptoms resolve, " the patient was advised to perform the stretches 3 times daily to prevent future recurrence.  The patient was instructed to perform warm soaks with [place] salt after which to also apply over-the-counter Voltaren gel to deeply massage the injured tissue.  The patient was instructed to do this on a daily basis until symptoms resolve.   The patient was referred to physical therapy for deep tissue fascial release of the gastroc complex and plantar fascia.  To reduce the amount of current inflammation, the patient was prescribed Medrol Dosepak to be taken as directed by pharmacy.  The patient will return in four weeks for reevaluation to determine if any further treatment will be needed.      Donato verbalized agreement with and understanding of the rational for the diagnosis and treatment plan.  All questions were answered to best of my ability and the patient's satisfaction. The patient was advised to contact the clinic with any questions that may arise after the clinic visit.      Disclaimer: This note consists of symbols derived from keyboarding, dictation and/or voice recognition software. As a result, there may be errors in the script that have gone undetected. Please consider this when interpreting information found in this chart.       TONY Kent.PSARAN., F.A.C.F.A.S.        Again, thank you for allowing me to participate in the care of your patient.        Sincerely,        Kadeem Duenas DPM

## 2022-09-09 NOTE — NURSING NOTE
"Chief Complaint   Patient presents with     RECHECK     Pain has inproved but hasn't gotten worse- swelling in ankle left      Consult     Gout flare up great right toe       Initial BP (!) 150/104   Pulse 89   Ht 1.854 m (6' 1\")   Wt 117.9 kg (260 lb)   BMI 34.30 kg/m   Estimated body mass index is 34.3 kg/m  as calculated from the following:    Height as of this encounter: 1.854 m (6' 1\").    Weight as of this encounter: 117.9 kg (260 lb).  Medications and allergies reviewed.      Marisa JIMENEZ MA    "

## 2022-09-09 NOTE — PATIENT INSTRUCTIONS
GOUT  Gout is a disorder that results from the build-up of uric acid in the tissues or a joint. It most often affects the joint of the big toe.  CAUSES  Gout attacks are caused by deposits of crystallized uric acid in the joint. Uric acid is present in the blood and eliminated in the urine, but in people who have gout, uric acid accumulates and crystallizes in the joints. Uric acid is the result of the breakdown of purines, chemicals that are found naturally in our bodies and in food. Some people develop gout because their kidneys have difficulty eliminating normal amounts of uric acid, while others produce too much uric acid.  Gout occurs most commonly in the big toe because uric acid is sensitive to temperature changes. At cooler temperatures, uric acid turns into crystals. Since the toe is the part of the body that is farthest from the heart, it s also the coolest part of the body - and, thus, the most likely target of gout. However, gout can affect any joint in the body.  The tendency to accumulate uric acid is often inherited. Other factors that put a person at risk for developing gout include: high blood pressure, diabetes, obesity, surgery, chemotherapy, stress, and certain medications and vitamins. For example, the body s ability to remove uric acid can be negatively affected by taking aspirin, some diuretic medications ( water pills ), and the vitamin niacin (also called nicotinic acid). While gout is more common in men aged 40 to 60 years, it can occur in younger men as well as in women.  Consuming foods and beverages that contain high levels of purines can trigger an attack of gout. Some foods contain more purines than others and have been associated with an increase of uric acid, which leads to gout. You may be able to reduce your chances of getting a gout attack by limiting or avoiding shellfish, organ meats (kidney, liver, etc.), red wine, beer, and red meat.  Other Triggers include but are not limited  to:  Congestive heart failure, deep vein thrombosis, pneumonia, fasting, dehydration, trauma/surgery, psoriasis.  SYMTOMS  An attack of gout can be miserable, marked by the following symptoms:  Intense pain that comes on suddenly - often in the middle of the night or upon arising   Signs of inflammation such as redness, swelling, and warmth over the joint.   DIAGNOSIS  To diagnose gout, the foot and ankle surgeon will ask questions about your personal and family medical history, followed by an examination of the affected joint. Laboratory tests and x-rays are sometimes ordered to determine if the inflammation is caused by something other than gout.  TREATMENT  Initial treatment of an attack of gout typically includes the following:  Medications. Prescription medications or injections are used to treat the pain, swelling, and inflammation.   Dietary restrictions. Foods and beverages that are high in purines should be avoided, since purines are converted in the body to uric acid.   Fluids. Drink plenty of water and other fluids each day, while also avoiding alcoholic beverages, which cause dehydration. Cherry Juice works well to help decrease pain.  Immobilize and elevate the foot. Avoid standing and walking to give your foot a rest. Also, elevate your foot (level with or slightly above the heart) to help reduce swelling.   The symptoms of gout and the inflammatory process usually resolve in three to ten days with treatment. If gout symptoms continue despite the initial treatment, or if repeated attacks occur, see your primary care physician for maintenance treatment that may involve daily medication. In cases of repeated episodes, the underlying problem must be addressed, as the build-up of uric acid over time can cause arthritic damage to the joint.  DIET CHANGE  A gout diet reduces your intake of foods that are high in purines, which helps control your body's production of uric acid. If you're overweight or obese,  lose weight. However, avoid fasting and rapid weight loss because these can promote a gout attack. Drink plenty of fluids to help flush uric acid from your body. Also avoid high-protein diets, which can cause you to produce too much uric acid (hyperuricemia).   To follow the diet:   Limit meat, poultry and fish. Animal proteins are high in purine. Avoid or severely limit high-purine foods, such as organ meats, herring, anchovies and mackerel. Red meat (beef, pork and lamb), fatty fish and seafood (tuna, shrimp, lobster and scallops) are associated with increased risk of gout. Because all meat, poultry and fish contain purines, limit your intake to 4 to 6 ounces (113 to 170 grams) daily.   Eat more plant-based proteins. You can increase your protein by including more plant-based sources, such as beans and legumes. This switch will also help you cut down on saturated fats, which may indirectly contribute to obesity and gout.   Limit or avoid alcohol. Alcohol interferes with the elimination of uric acid from your body. Drinking beer, in particular, has been linked to gout attacks. If you're having an attack, avoid alcohol. However, when you're not having an attack, drinking one or two 5-ounce (148 milliliter) servings a day of wine is not likely to increase your risk.   Drink plenty of fluids, particularly water. Fluids can help remove uric acid from your body. Aim for eight to 16 8-ounce (237 milliliter) glasses a day.   Choose low-fat or fat-free dairy products. Some studies have shown that drinking skim or low-fat milk and eating foods made with them, such as yogurt, help reduce the risk of gout. Aim for adequate dairy intake of 16 to 24 fluid ounces (473 to 710 milliliters) daily.   Choose complex carbohydrates. Eat more whole grains and fruits and vegetables and fewer refined carbohydrates, such as white bread, cakes and candy.   Limit or avoid sugar. Too many sweets can leave you with no room for plant-based  proteins and low-fat or fat-free dairy products -- the foods you need to avoid gout. Sugary foods also tend to be high in calories, so they make it easier to eat more than you're likely to burn off. Although there's debate about whether sugar has a direct effect on uric acid levels, sweets are definitely linked to overweight and obesity.   There's also some evidence that drinking four to six cups of coffee a day lowers gout risk in men.   RESULTS  Following a gout diet can help you limit your body's uric acid production and increase its elimination. It's not likely to lower the uric acid concentration in your blood enough to treat your gout without medication, but it may help decrease the number of attacks and limit their severity. Following the gout diet and limiting your calories -- particularly if you also add in moderate daily exercise, such as brisk walking -- also can improve your overall health by helping you achieve and maintain a healthy weight.

## 2022-09-09 NOTE — PROGRESS NOTES
"Donato returns to the office for reevaluation of the left foot.  The patient relates following the instructions given at the last visit with noted overall more pain and less improvement in function of the left foot.   The patient relates no other problems.    Vitals: BP (!) 150/104   Pulse 89   Ht 1.854 m (6' 1\")   Wt 117.9 kg (260 lb)   BMI 34.30 kg/m    BMI= Body mass index is 34.3 kg/m .    Lower Extremity Physical Exam:      Neurovascular status remains unchanged.  Muscular exam is within normal limits to major muscle groups.  Integument is intact.      Noted pain on palpation on the plantar aspect and posterior aspect of the left heel near the insertion of the plantar fascia and Achilles tendon on the left.  Noted pain with ankle range of motion on the left.  No crepitus noted.    Diagnostics:  Recent radiograph evaluation including three views of the left foot and ankle reveals calcaneal spurring with no degenerative changes to the ankle joint.       Assessment:     ICD-10-CM    1. Acute idiopathic gout of left ankle  M10.072 methylPREDNISolone (MEDROL DOSEPAK) 4 MG tablet therapy pack   2. Plantar fasciitis, left  M72.2 Physical Therapy Referral     methylPREDNISolone (MEDROL DOSEPAK) 4 MG tablet therapy pack   3. Tendonitis, Achilles, left  M76.62 Physical Therapy Referral     methylPREDNISolone (MEDROL DOSEPAK) 4 MG tablet therapy pack       Plan:    I have explained to Donato about the progress of the conditions.  At this time, the patient was educated on the importance of offloading supportive shoes and other devices.  I demonstrated to the patient calf stretches to perform every hour daily until symptoms resolve.  After symptoms resolve, the patient was advised to perform the stretches 3 times daily to prevent future recurrence.  The patient was instructed to perform warm soaks with Epson salt after which to also apply over-the-counter Voltaren gel to deeply massage the injured tissue.  The patient " was instructed to do this on a daily basis until symptoms resolve.   The patient was referred to physical therapy for deep tissue fascial release of the gastroc complex and plantar fascia.  To reduce the amount of current inflammation, the patient was prescribed Medrol Dosepak to be taken as directed by pharmacy.  The patient will return in four weeks for reevaluation to determine if any further treatment will be needed.      Donato verbalized agreement with and understanding of the rational for the diagnosis and treatment plan.  All questions were answered to best of my ability and the patient's satisfaction. The patient was advised to contact the clinic with any questions that may arise after the clinic visit.      Disclaimer: This note consists of symbols derived from keyboarding, dictation and/or voice recognition software. As a result, there may be errors in the script that have gone undetected. Please consider this when interpreting information found in this chart.       DANIELLE Duenas D.P.M., F.A.C.F.A.S.

## 2022-09-29 ENCOUNTER — ANTICOAGULATION THERAPY VISIT (OUTPATIENT)
Dept: ANTICOAGULATION | Facility: CLINIC | Age: 52
End: 2022-09-29

## 2022-09-29 ENCOUNTER — NURSE TRIAGE (OUTPATIENT)
Dept: NURSING | Facility: CLINIC | Age: 52
End: 2022-09-29

## 2022-09-29 DIAGNOSIS — M72.2 PLANTAR FASCIITIS, LEFT: Primary | ICD-10-CM

## 2022-09-29 LAB — INR HOME MONITORING: 3 (ref 2–3)

## 2022-09-29 RX ORDER — METHYLPREDNISOLONE 4 MG
4 TABLET, DOSE PACK ORAL SEE ADMIN INSTRUCTIONS
Qty: 21 TABLET | Refills: 0 | Status: SHIPPED | OUTPATIENT
Start: 2022-09-29 | End: 2023-10-18

## 2022-09-29 NOTE — TELEPHONE ENCOUNTER
Having re-occurring symptoms with foot. Extreme pain. July 2nd it started. Five years ago had gout and plantar faciitis. Couldn't walk on July 2nd. Given steroiods and it subsides. One week after dosing it's back. Minimal activity, near bed rest. Visited family and had another re-occurrence. He was in [place] and needed a wheel chair. They renewed steroid. He's back to where he started. He had a conversation with an outside RN that works with Entyvio injection every couple of months, which is a biologic infusion to treat ulcerative cholitis. She was asking if he had side effects. Joint pain is one of the side effects pain in extremities. Put a call into GI who treats the ulcerative cholitis and hasn't heard back from them yet. Doesn't know if that's what's going on. What changed in last year and five years but injections may be the issue. The way this progresses he states that tomorrow he will be back in trouble with difficulty walking or taking steps. Has therapy tomorrow afternoon. MD thought it may be flare up of plantar faciitis. It doesn't feel the same. The idea of someone working on his foot with the pain, he doesn't know he can do that. I told him he can change his therapy. Methylprednisolone works. He's trying to research anything that's causing this severity. Nothing in June triggered it, as best as he can tell. He's been treated thru urgent care.  He's also seen Dr Duenas who has treated him. Podiatry is who he wants to get this as a FYI. He is in need of a refill of methylprednisolone.  He is cancelling PT until this resolves. He can't handle the pain with what is going on. Please call patient back about the request for methylprednisolone. Pharmacy [facility]. Please call him at:   [phone].  May leave detailed message.  Viky Choi RN  [facility]    Reason for Disposition    Caller has URGENT medicine question about med that PCP or specialist prescribed and triager  unable to answer question    Additional Information    Negative: Intentional drug overdose and suicidal thoughts or ideas    Negative: Drug overdose and triager unable to answer question    Negative: Caller requesting a renewal or refill of a medicine patient is currently taking    Negative: Caller requesting information unrelated to medicine    Negative: Caller requesting information about COVID-19 Vaccine    Negative: Caller requesting information about Emergency Contraception    Negative: Caller requesting information about Combined Birth Control Pills    Negative: Caller requesting information about Progestin Birth Control Pills    Negative: Caller requesting information about Post-Op pain or medicines    Negative: Caller requesting a prescription antibiotic (such as penicillin) for Strep throat and has a positive culture result    Negative: Caller requesting a prescription anti-viral med (such as Tamiflu) and has influenza (flu) symptoms    Negative: Immunization reaction suspected    Negative: Rash while taking a medicine or within 3 days of stopping it    Negative: Asthma and having symptoms of asthma (cough, wheezing, etc.)    Negative: Symptom of illness (e.g., headache, abdominal pain, earache, vomiting) that are more than mild    Negative: Breastfeeding questions about mother's medicines and diet    Negative: MORE THAN A DOUBLE DOSE of a prescription or over-the-counter (OTC) drug    Negative: DOUBLE DOSE (an extra dose or lesser amount) of prescription drug and any symptoms (e.g., dizziness, nausea, pain, sleepiness)    Negative: DOUBLE DOSE (an extra dose or lesser amount) of over-the-counter (OTC) drug and any symptoms (e.g., dizziness, nausea, pain, sleepiness)    Negative: Took another person's prescription drug    Negative: DOUBLE DOSE (an extra dose or lesser amount) of prescription drug and NO symptoms  (Exception: A double dose of antibiotics.)    Negative: Diabetes drug error or overdose (e.g.,  took wrong type of insulin or took extra dose)    Negative: Caller has medication question about med NOT prescribed by PCP and triager unable to answer question (e.g., compatibility with other med, storage)    Negative: Pharmacy calling with prescription question and triager unable to answer question    Negative: Prescription not at pharmacy and was prescribed by PCP recently  (Exception: triager has access to EMR and prescription is recorded there. Go to Home Care and confirm for pharmacy.)    Protocols used: MEDICATION QUESTION CALL-A-[place]

## 2022-09-29 NOTE — PROGRESS NOTES
ANTICOAGULATION MANAGEMENT     Donato De Leon 52 year old male is on warfarin with therapeutic INR result. (Goal INR 2.0-3.0)    Recent labs: (last 7 days)     09/29/22  0000   INR 3.0       ASSESSMENT       Source(s): Chart Review    Previous INR was Supratherapeutic     Pt has left foot pain. Pt was referred for physical therapy for deep tissue fascial release of the gastroc complex and plantar fascia R/O gout. Pt had podiatry visit on 9/19/22 and given another medrol dose sarah. The prednisone can increase and decrease INR. The pain and inflammation can increase INR. Will continue to monitor. Continue maintenance dose of warfarin for now.Pt to call back if updates, questions or concerns. Previous INR was 9/8/22.         PLAN       Dosing Instructions: Continue your current warfarin dose with next INR in 2 weeks       Summary  As of 9/29/2022    Full warfarin instructions:  5 mg every Mon; 10 mg all other days   Next INR check:  10/13/2022             Detailed voice message left for Jerry with dosing instructions and follow up date.     Patient to recheck with home meter    Education provided: Importance of notifying clinic for changes in medications; a sooner lab recheck maybe needed. and Contact [phone]  with any changes, questions or concerns.     Plan made per [facility] anticoagulation protocol    Kim Carbajal RN  Anticoagulation Clinic  9/29/2022    _______________________________________________________________________     Anticoagulation Episode Summary     Current INR goal:  2.0-3.0   TTR:  72.4 % (1 y)   Target end date:  Indefinite   Send INR reminders to:  [facility] HOME MONITORING    Indications    Deep vein thrombosis (DVT) (H) [I82.409] (Resolved) [I82.409]  Long-term (current) use of anticoagulants [Z79.01] [Z79.01]  Deep vein thrombosis (DVT) of left lower extremity  unspecified chronicity  unspecified vein (H) [I82.402]  Ulcerative colitis with complication  unspecified location (H)  [M92.757]  Acute deep vein thrombosis (DVT) of right lower extremity  unspecified vein (H) (Resolved) [I82.401]           Comments:  [employer] [facility] INR Monitoring patient. Monitor by exception. OK to leave DVM.         Anticoagulation Care Providers     Provider Role Specialty Phone number    Carter Skinner PA-C Referring Family Medicine [phone]

## 2022-09-29 NOTE — TELEPHONE ENCOUNTER
Spoke to patient discussed notify patient  that a prescribed refill for a Mederol Dosepak was approved and sent to their pharmacy.  The patient will need to return to clinic for reevaluation if symptoms persist.patient understood and had no further questions.     Phone Number patient can be reached at:  Cell number on file:    Telephone Information:   Mobile [phone]       Best Time:  any    Can we leave a detailed message on this number:  YES

## 2022-09-29 NOTE — TELEPHONE ENCOUNTER
Please notify the patient that a prescribed refill for a Mederol Dosepak was approved and sent to their pharmacy.  The patient will need to return to clinic for reevaluation if symptoms persist.

## 2022-09-29 NOTE — TELEPHONE ENCOUNTER
Provider Response to 2nd Level Triage Request    I have reviewed the RN documentation. My recommendation is:  to have another course of the Medrol Dosepak then follow up in clinic for reevaluation

## 2022-10-05 ENCOUNTER — TRANSFERRED RECORDS (OUTPATIENT)
Dept: HEALTH INFORMATION MANAGEMENT | Facility: CLINIC | Age: 52
End: 2022-10-05

## 2022-10-05 ENCOUNTER — MEDICAL CORRESPONDENCE (OUTPATIENT)
Dept: HEALTH INFORMATION MANAGEMENT | Facility: CLINIC | Age: 52
End: 2022-10-05

## 2022-10-05 LAB
ALT SERPL-CCNC: 26 IU/L (ref 0–44)
AST SERPL-CCNC: 16 IU/L (ref 0–40)
CREATININE (EXTERNAL): 1.25 MG/DL (ref 0.76–1.27)
GFR ESTIMATED (EXTERNAL): 69 ML/MIN/1.73
GLUCOSE (EXTERNAL): 101 MG/DL (ref 70–99)
POTASSIUM (EXTERNAL): 4.5 MMOL/L (ref 3.5–5.2)

## 2022-10-07 ENCOUNTER — NURSE TRIAGE (OUTPATIENT)
Dept: NURSING | Facility: CLINIC | Age: 52
End: 2022-10-07

## 2022-10-07 DIAGNOSIS — M72.2 PLANTAR FASCIITIS, LEFT: Primary | ICD-10-CM

## 2022-10-07 RX ORDER — METHYLPREDNISOLONE 4 MG
4 TABLET, DOSE PACK ORAL SEE ADMIN INSTRUCTIONS
Qty: 21 TABLET | Refills: 0 | Status: SHIPPED | OUTPATIENT
Start: 2022-10-07 | End: 2022-11-21

## 2022-10-07 NOTE — TELEPHONE ENCOUNTER
Patient is calling and states that he submitted a refill request for Methylprednisolone.  Patient is having pain in left foot heel around ankle.  Patient states that he gets this when he stops Methylprednisolone.  Pharmacy of choice is [facility].  Patient denies fever cough and shortness of breath.  Patient was exposed to covid through his daughter that is positive.  Patient does not wish to come into clinic.  Patient states that pain is starting to become severe and he feels that he cannot go the weekend without the medication.  Patient denies entire foot is cool or blue and denies purple or black skin on foot or toe  Denies red streak and denies swollen foot.  Please phone patient regarding medication.      Reason for Disposition    SEVERE pain (e.g., excruciating, unable to do any normal activities)    Additional Information    Negative: Entire foot is cool or blue in comparison to other foot    Negative: Purple or black skin on foot or toe    Negative: Red area or streak and fever    Negative: Swollen foot and fever    Negative: Patient sounds very sick or weak to the triager    Protocols used: FOOT PAIN-A-OH

## 2022-10-17 ENCOUNTER — TELEPHONE (OUTPATIENT)
Dept: ANTICOAGULATION | Facility: CLINIC | Age: 52
End: 2022-10-17

## 2022-10-17 NOTE — TELEPHONE ENCOUNTER
ANTICOAGULATION     Donato De Leon is overdue for INR check.      Left message reminding patient to check INR with their home meter and call results to the home monitoring company as soon as possible.     Maggie Varghese RN

## 2022-10-18 ENCOUNTER — ANTICOAGULATION THERAPY VISIT (OUTPATIENT)
Dept: ANTICOAGULATION | Facility: CLINIC | Age: 52
End: 2022-10-18

## 2022-10-18 DIAGNOSIS — Z79.01 LONG TERM CURRENT USE OF ANTICOAGULANT THERAPY: Primary | ICD-10-CM

## 2022-10-18 DIAGNOSIS — K51.919 ULCERATIVE COLITIS WITH COMPLICATION, UNSPECIFIED LOCATION (H): ICD-10-CM

## 2022-10-18 DIAGNOSIS — I82.402 DEEP VEIN THROMBOSIS (DVT) OF LEFT LOWER EXTREMITY, UNSPECIFIED CHRONICITY, UNSPECIFIED VEIN (H): ICD-10-CM

## 2022-10-18 LAB — INR HOME MONITORING: 3.4 (ref 2–3)

## 2022-10-18 NOTE — PROGRESS NOTES
ANTICOAGULATION MANAGEMENT     Donato De Leon 52 year old male is on warfarin with supratherapeutic INR result. (Goal INR 2.0-3.0)    Recent labs: (last 7 days)     10/18/22  0000   INR 3.4*       ASSESSMENT       Source(s): Chart Review and Patient/Caregiver Call       Warfarin doses taken: Warfarin taken as instructed    Diet: No new diet changes identified    New illness, injury, or hospitalization: Yes: foot pain, unchanged    Medication/supplement changes: he has been on and off prednisone recently for his foot pain. Currently off for about the last 10 days    Signs or symptoms of bleeding or clotting: No    Previous INR: Therapeutic last visit; previously outside of goal range    Additional findings: None       PLAN     Recommended plan for temporary change(s) affecting INR     Dosing Instructions: partial hold then continue your current warfarin dose with next INR in 2 weeks       Summary  As of 10/18/2022    Full warfarin instructions:  10/18: 5 mg; Otherwise 5 mg every Mon; 10 mg all other days   Next INR check:  11/1/2022             Telephone call with Jerry who verbalizes understanding and agrees to plan    Patient to recheck with home meter    Education provided: Goal range and significance of current result, Potential interaction between warfarin and prednisone and Monitoring for bleeding signs and symptoms    Plan made per [facility] anticoagulation protocol    Alla Colmenares RN  Anticoagulation Clinic  10/18/2022    _______________________________________________________________________     Anticoagulation Episode Summary     Current INR goal:  2.0-3.0   TTR:  67.2 % (1 y)   Target end date:  Indefinite   Send INR reminders to:  [facility] HOME MONITORING    Indications    Deep vein thrombosis (DVT) (H) [I82.409] (Resolved) [I82.409]  Long-term (current) use of anticoagulants [Z79.01] [Z79.01]  Deep vein thrombosis (DVT) of left lower extremity  unspecified chronicity  unspecified vein (H)  [I82.402]  Ulcerative colitis with complication  unspecified location (H) [K51.919]  Acute deep vein thrombosis (DVT) of right lower extremity  unspecified vein (H) (Resolved) [I82.401]           Comments:  [employer] [facility] INR Monitoring patient. Monitor by exception. OK to leave DVM.         Anticoagulation Care Providers     Provider Role Specialty Phone number    Carter Skinner PA-C Referring [facility] [phone]

## 2022-11-08 ENCOUNTER — TELEPHONE (OUTPATIENT)
Dept: ANTICOAGULATION | Facility: CLINIC | Age: 52
End: 2022-11-08

## 2022-11-08 ENCOUNTER — ANTICOAGULATION THERAPY VISIT (OUTPATIENT)
Dept: ANTICOAGULATION | Facility: CLINIC | Age: 52
End: 2022-11-08

## 2022-11-08 ENCOUNTER — DOCUMENTATION ONLY (OUTPATIENT)
Dept: ANTICOAGULATION | Facility: CLINIC | Age: 52
End: 2022-11-08

## 2022-11-08 DIAGNOSIS — I82.402 DEEP VEIN THROMBOSIS (DVT) OF LEFT LOWER EXTREMITY, UNSPECIFIED CHRONICITY, UNSPECIFIED VEIN (H): ICD-10-CM

## 2022-11-08 DIAGNOSIS — K51.919 ULCERATIVE COLITIS WITH COMPLICATION, UNSPECIFIED LOCATION (H): ICD-10-CM

## 2022-11-08 DIAGNOSIS — Z79.01 LONG TERM CURRENT USE OF ANTICOAGULANT THERAPY: Primary | ICD-10-CM

## 2022-11-08 LAB — INR HOME MONITORING: 2.1 (ref 2–3)

## 2022-11-08 NOTE — PROGRESS NOTES
ANTICOAGULATION MANAGEMENT     Donato De Leon 52 year old male is on warfarin with therapeutic INR result. (Goal INR 2.0-3.0)    Recent labs: (last 7 days)     11/08/22  0000   INR 2.1       ASSESSMENT       Source(s): Chart Review    Previous INR was Supratherapeutic    Medication, diet, health changes since last INR chart reviewed; none identified           PLAN     Recommended plan for no diet, medication or health factor changes affecting INR     Dosing Instructions: Continue your current warfarin dose with next INR in 2 weeks       Summary  As of 11/8/2022    Full warfarin instructions:  5 mg every Mon; 10 mg all other days; Starting 11/8/2022   Next INR check:  11/22/2022             Detailed voice message left for Jerry with dosing instructions and follow up date.     Patient to recheck with home meter    Education provided:     Please call back if any changes to your diet, medications or how you've been taking warfarin    Resume manage by exception with home monitor. Continue to submit INR results to home monitor company.You will only be called when your result is out of range. Please call and notify [facility] if new medication started, dose missed, signs or symptoms of bleeding or clotting, or a surgery/procedure is scheduled.    Plan made per [facility] anticoagulation protocol    Annette Preciado RN  Anticoagulation Clinic  11/8/2022    _______________________________________________________________________     Anticoagulation Episode Summary     Current INR goal:  2.0-3.0   TTR:  70.1 % (1 y)   Target end date:  Indefinite   Send INR reminders to:  [facility] HOME MONITORING    Indications    Deep vein thrombosis (DVT) (H) [I82.409] (Resolved) [I82.409]  Long-term (current) use of anticoagulants [Z79.01] [Z79.01]  Deep vein thrombosis (DVT) of left lower extremity  unspecified chronicity  unspecified vein (H) [I82.402]  Ulcerative colitis with complication  unspecified location (H) [K51.419]  Acute deep vein thrombosis  (DVT) of right lower extremity  unspecified vein (H) (Resolved) [I82.401]           Comments:  [employer] [facility] INR Monitoring patient. Monitor by exception. OK to leave DVM.         Anticoagulation Care Providers     Provider Role Specialty Phone number    Carter Skinner PA-C Referring Family Medicine [phone]

## 2022-11-08 NOTE — TELEPHONE ENCOUNTER
Incoming fax from [facility] requesting a 4 day hold of warfarin prior to colonoscopy on 12/8/22    Please call orders to [facility] at [phone]

## 2022-11-09 ENCOUNTER — THERAPY VISIT (OUTPATIENT)
Dept: PHYSICAL THERAPY | Facility: CLINIC | Age: 52
End: 2022-11-09
Attending: PODIATRIST
Payer: COMMERCIAL

## 2022-11-09 DIAGNOSIS — M79.672 LEFT FOOT PAIN: ICD-10-CM

## 2022-11-09 DIAGNOSIS — M72.2 PLANTAR FASCIITIS, LEFT: ICD-10-CM

## 2022-11-09 DIAGNOSIS — M76.62 TENDONITIS, ACHILLES, LEFT: ICD-10-CM

## 2022-11-09 PROCEDURE — 97161 PT EVAL LOW COMPLEX 20 MIN: CPT | Mod: GP | Performed by: PHYSICAL THERAPIST

## 2022-11-09 PROCEDURE — 97110 THERAPEUTIC EXERCISES: CPT | Mod: GP | Performed by: PHYSICAL THERAPIST

## 2022-11-09 NOTE — PROGRESS NOTES
Physical Therapy Initial Evaluation  Subjective:  The history is provided by the patient.   Therapist Generated HPI Evaluation  Problem details: Had plantar fasciitis 6-7 years ago.  Pain had improved and resolved.  Woke up with pain in June due to unknown etiology.  Went to ED a few times due to pain and was unable to put any weight on his L foot.  Was on and off prednisone for several months.  Meds worked for a while and then sx would return.  Pt also has colitis and started a new medication and sx in foot started about 1 month later.  Recently stopped the meds and had another steroid pac and then sx have resolved. Not sure if it was from the last steroid pack or stopping the other medication.  This past week he has noticed a new pain in his R foot after working in the yard.  Has a history of fractures so not sure if it is a stress fracture or not. All pain is on R currently.  .         Type of problem:  Left ankle and left foot.    This is a new condition.  Condition occurred with:  Insidious onset (woke up with pain).  Where condition occurred: for unknown reasons.  Patient reports pain:  Longitudinal arch and posterior.  Pain quality: much improved with stopping meds. Pain frequency: No reported pain on L currently.  Pain radiates to:  No radiation. Pain is the same all the time.  Since onset symptoms are rapidly improving.  Associated symptoms:  Loss of motion/stiffness. Symptoms are exacerbated by nothing (L foot doing well currently)  Relieved by: stopping meds.  Special tests included:  X-ray (spurs).    Restrictions due to condition include:  Working in normal job without restrictions.      Patient Health History  Donato De Leon being seen for L foot therapy.     Problem began: 6/28/2022.   Problem occurred: unknown - possibly medication   Pain is reported as 2/10 on pain scale.  General health as reported by patient is fair.  Pertinent medical history includes: cancer and history of fractures  (ulcerative colitis).   Red flags:  None as reported by patient.  Medical allergies: none.   Surgeries include:  Cancer surgery. Other surgery history details: skin.    Current medications:  None.    Current occupation is Sales.   Primary job tasks include:  Computer work and prolonged sitting.                                    Objective:    Gait:  Currently antalgic due to R foot.  Initially when pain started on L foot he was unable to put any weight on it.    Gait Type:  Antalgic     Deviations:  Ankle:  Push off decr R and heel strike decr R          Ankle/Foot Evaluation  ROM:    AROM:    Dorsiflexion: Left:   0  Right:    Plantarflexion: Left:  Normal     Right:             Strength is normal (without pain).  LIGAMENT TESTING: normal              SPECIAL TESTS: normal    PALPATION: Palpation of ankle: denies tenderness on L     EDEMA: normal                                                              General     ROS    Assessment/Plan:    Patient is a 52 year old male with resolving L foot pain complaints.    Patient has the following significant findings with corresponding treatment plan.                Diagnosis 1:  L foot  Pain -  manual therapy, self management, education, directional preference exercise and home program  Decreased ROM/flexibility - manual therapy and therapeutic exercise  Impaired muscle performance - neuro re-education  Decreased function - therapeutic activities    Therapy Evaluation Codes:   1) History comprised of:   Personal factors that impact the plan of care:      None.    Comorbidity factors that impact the plan of care are:      Cancer and ulcerative colitis.     Medications impacting care: None.  2) Examination of Body Systems comprised of:   Body structures and functions that impact the plan of care:      foot.   Activity limitations that impact the plan of care are:      Walking.  3) Clinical presentation characteristics  are:   Stable/Uncomplicated.  4) Decision-Making    Low complexity using standardized patient assessment instrument and/or measureable assessment of functional outcome.  Cumulative Therapy Evaluation is: Low complexity.    Previous and current functional limitations:  (See Goal Flow Sheet for this information)    Short term and Long term goals: (See Goal Flow Sheet for this information)     Communication ability:  Patient appears to be able to clearly communicate and understand verbal and written communication and follow directions correctly.  Treatment Explanation - The following has been discussed with the patient:   RX ordered/plan of care  Anticipated outcomes  Possible risks and side effects  This patient would benefit from PT intervention to resume normal activities.   Rehab potential is good.    Frequency:  2 X a month, once daily  Duration:  for 2 months  Discharge Plan:  Achieve all LTG.  Independent in home treatment program.  Reach maximal therapeutic benefit.    Please refer to the daily flowsheet for treatment today, total treatment time and time spent performing 1:1 timed codes.

## 2022-11-14 NOTE — TELEPHONE ENCOUNTER
Voice mail message left on home care line to follow up hold orders requested for upcoming on colonoscopy on 12/8/22.  Requesting a 4 day hold.    MICHELE BloodN, RN  Anticoagulation Clinic

## 2022-11-18 NOTE — TELEPHONE ENCOUNTER
Clarissa from [facility] calling again requesting hold orders. Noted that pharmacists is working on plan still.

## 2022-11-18 NOTE — TELEPHONE ENCOUNTER
"LAVON-PROCEDURAL ANTICOAGULATION  MANAGEMENT    ASSESSMENT     Warfarin interruption plan for colonoscopy on 2022.    Indication for Anticoagulation: DVT      DVT 2017, provoked with UC flare     Successfully tolerated hold, no bridge 2021 for colonoscopy      Lavon-Procedure Risk stratification for thromboembolism: low ( Chest guidelines and 2018 American Society of Hematology guideline)    VTE:  CHEST Perioperative Management guidelines suggest against bridging for patients with Hx of VTE as sole clinical indication for warfarin except in high risk stratification patients.  VTE: 2018 American Society of Hematology recommends against bridging in low and moderate risk VTE patients holding warfarin     RECOMMENDATION       Pre-Procedure:  o Hold warfarin for 4 days, until after procedure startin2022   o No Bridge      Post-Procedure:  o Resume warfarin dose if okay with provider doing procedure on night of procedure, 2022 PM: 20mg  o Recheck INR ~ 7 days after resuming warfarin       Plan routed to referring provider for approval  ?   Stormy Steward [facility]    SUBJECTIVE/OBJECTIVE     Donato De Leon, a 52 year old male    Goal INR Range: 2.0-3.0     Patient bridged in past: Yes: 2020 before updated guidelines      Wt Readings from Last 3 Encounters:   22 117.9 kg (260 lb)   22 117.9 kg (260 lb)   22 118.3 kg (260 lb 12.8 oz)      Ideal body weight: 79.9 kg (176 lb 2.4 oz)  Adjusted ideal body weight: 95.1 kg (209 lb 11 oz)     Estimated body mass index is 34.3 kg/m  as calculated from the following:    Height as of 22: 1.854 m (6' 1\").    Weight as of 22: 117.9 kg (260 lb).    Lab Results   Component Value Date    INR 2.1 2022    INR 3.4 (H) 10/18/2022    INR 3.0 2022     Lab Results   Component Value Date    HGB 16.1 2022    HGB 15.8 2017    HCT 46.8 2022    HCT 46.9 2017     2022     2017 "     Lab Results   Component Value Date    CR 1.23 03/16/2022    CR 1.39 (H) 08/11/2021    CR 1.06 01/24/2020     CrCl cannot be calculated (Patient's most recent lab result is older than the maximum 30 days allowed.).

## 2022-11-21 ENCOUNTER — MYC REFILL (OUTPATIENT)
Dept: FAMILY MEDICINE | Facility: CLINIC | Age: 52
End: 2022-11-21

## 2022-11-21 DIAGNOSIS — M72.2 PLANTAR FASCIITIS, LEFT: ICD-10-CM

## 2022-11-21 RX ORDER — METHYLPREDNISOLONE 4 MG
4 TABLET, DOSE PACK ORAL SEE ADMIN INSTRUCTIONS
Qty: 21 TABLET | Refills: 0 | Status: SHIPPED | OUTPATIENT
Start: 2022-11-21 | End: 2023-10-18

## 2022-11-21 NOTE — TELEPHONE ENCOUNTER
Please notify the patient that a prescribed refill for a Medrol Dosepak was approved and sent to their pharmacy.  The patient will need to return to clinic for reevaluation.

## 2022-11-21 NOTE — TELEPHONE ENCOUNTER
Please call the patient back and find out which joint is painful.  He has taken as much oral steroid as I am comfortable prescribing and am going to recommend a steroid injection.  I can place an order for ultrasound guided injection that will provide more long term relief of the inflammation pain.  I just need to confirm which joint.  Thank you.

## 2022-11-23 NOTE — TELEPHONE ENCOUNTER
Called and left detailed message for [facility] of the hold, no bridge plan below. Called patient and informed of the procedure plan as written below.    Chanelle Molina RN    [facility]

## 2022-11-30 ENCOUNTER — ANTICOAGULATION THERAPY VISIT (OUTPATIENT)
Dept: ANTICOAGULATION | Facility: CLINIC | Age: 52
End: 2022-11-30

## 2022-11-30 ENCOUNTER — THERAPY VISIT (OUTPATIENT)
Dept: PHYSICAL THERAPY | Facility: CLINIC | Age: 52
End: 2022-11-30
Payer: COMMERCIAL

## 2022-11-30 DIAGNOSIS — K51.919 ULCERATIVE COLITIS WITH COMPLICATION, UNSPECIFIED LOCATION (H): ICD-10-CM

## 2022-11-30 DIAGNOSIS — I82.402 DEEP VEIN THROMBOSIS (DVT) OF LEFT LOWER EXTREMITY, UNSPECIFIED CHRONICITY, UNSPECIFIED VEIN (H): ICD-10-CM

## 2022-11-30 DIAGNOSIS — M79.672 LEFT FOOT PAIN: Primary | ICD-10-CM

## 2022-11-30 DIAGNOSIS — Z79.01 LONG TERM CURRENT USE OF ANTICOAGULANT THERAPY: Primary | ICD-10-CM

## 2022-11-30 LAB — INR HOME MONITORING: 1.9 (ref 2–3)

## 2022-11-30 PROCEDURE — 97110 THERAPEUTIC EXERCISES: CPT | Mod: GP | Performed by: PHYSICAL THERAPIST

## 2022-11-30 NOTE — PROGRESS NOTES
ANTICOAGULATION MANAGEMENT     Donato De Leon 52 year old male is on warfarin with subtherapeutic INR result. (Goal INR 2.0-3.0)    Recent labs: (last 7 days)     11/30/22  0000   INR 1.9*       ASSESSMENT       Source(s): Chart Review and Patient/Caregiver Call       Warfarin doses taken: Warfarin taken as instructed    Diet: No new diet changes identified    New illness, injury, or hospitalization: Yes: Severe foot pain, patient reports it's not as intense but it has not gone away, Patient has been icing foot to help with pain.    Medication/supplement changes: Patient has completed a 6 day dosing of methylprednisolone, Tylelnol usage for pain.    Signs or symptoms of bleeding or clotting: No    Previous INR: Therapeutic last visit; previously outside of goal range   Additional findings: Colonscopy on 12/8/22- patient was given dosing to hold by [facility] by [facility] nurse last week, see TE from 11/8/22     PLAN     Recommended plan for no diet, medication or health factor changes affecting INR     Dosing Instructions: Continue your current warfarin dose with next INR in 2 weeks       Summary  As of 11/30/2022    Full warfarin instructions:  12/4: Hold; 12/5: Hold; 12/6: Hold; 12/7: Hold; Otherwise 5 mg every Mon; 10 mg all other days; Starting 11/30/2022   Next INR check:  12/14/2022             Telephone call with Jerry who verbalizes understanding and agrees to plan    Patient to recheck with home meter    Education provided:     Please call back if any changes to your diet, medications or how you've been taking warfarin    Plan made per [facility] anticoagulation protocol    Kia Domingo, RN  Anticoagulation Clinic  11/30/2022    _______________________________________________________________________     Anticoagulation Episode Summary     Current INR goal:  2.0-3.0   TTR:  69.1 % (1 y)   Target end date:  Indefinite   Send INR reminders to:  [facility] HOME MONITORING    Indications    Deep vein thrombosis (DVT) (H) [I82.409]  (Resolved) [I82.409]  Long-term (current) use of anticoagulants [Z79.01] [Z79.01]  Deep vein thrombosis (DVT) of left lower extremity  unspecified chronicity  unspecified vein (H) [I82.402]  Ulcerative colitis with complication  unspecified location (H) [K51.919]  Acute deep vein thrombosis (DVT) of right lower extremity  unspecified vein (H) (Resolved) [I82.401]           Comments:  [employer] [facility] INR Monitoring patient. Monitor by exception. OK to leave DVM.         Anticoagulation Care Providers     Provider Role Specialty Phone number    Carter Skinner PA-C Referring Family Medicine [phone]

## 2022-12-08 ENCOUNTER — TRANSFERRED RECORDS (OUTPATIENT)
Dept: HEALTH INFORMATION MANAGEMENT | Facility: CLINIC | Age: 52
End: 2022-12-08

## 2022-12-19 ENCOUNTER — ANTICOAGULATION THERAPY VISIT (OUTPATIENT)
Dept: ANTICOAGULATION | Facility: CLINIC | Age: 52
End: 2022-12-19

## 2022-12-19 DIAGNOSIS — K51.919 ULCERATIVE COLITIS WITH COMPLICATION, UNSPECIFIED LOCATION (H): ICD-10-CM

## 2022-12-19 DIAGNOSIS — Z79.01 LONG TERM CURRENT USE OF ANTICOAGULANT THERAPY: Primary | ICD-10-CM

## 2022-12-19 DIAGNOSIS — I82.402 DEEP VEIN THROMBOSIS (DVT) OF LEFT LOWER EXTREMITY, UNSPECIFIED CHRONICITY, UNSPECIFIED VEIN (H): ICD-10-CM

## 2022-12-19 LAB — INR HOME MONITORING: 2.3 (ref 2–3)

## 2022-12-19 NOTE — PROGRESS NOTES
ANTICOAGULATION MANAGEMENT     Donato De Leon 52 year old male is on warfarin with therapeutic INR result. (Goal INR 2.0-3.0)    Recent labs: (last 7 days)     12/19/22  0000   INR 2.3       ASSESSMENT       Source(s): Chart Review and Patient/Caregiver Call       Warfarin doses taken: Warfarin taken as instructed    Diet: No new diet changes identified    New illness, injury, or hospitalization: No    Medication/supplement changes: None noted    Signs or symptoms of bleeding or clotting: No    Previous INR: Subtherapeutic    Additional findings: None       PLAN     Recommended plan for no diet, medication or health factor changes affecting INR     Dosing Instructions: Continue your current warfarin dose with next INR in 2 weeks       Summary  As of 12/19/2022    Full warfarin instructions:  5 mg every Mon; 10 mg all other days; Starting 12/19/2022   Next INR check:  1/2/2023             Telephone call with Jerry who verbalizes understanding and agrees to plan    Patient to recheck with home meter    Education provided:     Resume manage by exception with home monitor. Continue to submit INR results to home monitor company.You will only be called when your result is out of range. Please call and notify [facility] if new medication started, dose missed, signs or symptoms of bleeding or clotting, or a surgery/procedure is scheduled.    Plan made per [facility] anticoagulation protocol    Kia Domingo RN  Anticoagulation Clinic  12/19/2022    _______________________________________________________________________     Anticoagulation Episode Summary     Current INR goal:  2.0-3.0   TTR:  67.8 % (1 y)   Target end date:  Indefinite   Send INR reminders to:  [facility] HOME MONITORING    Indications    Deep vein thrombosis (DVT) (H) [I82.409] (Resolved) [I82.409]  Long-term (current) use of anticoagulants [Z79.01] [Z79.01]  Deep vein thrombosis (DVT) of left lower extremity  unspecified chronicity  unspecified vein (H)  [I82.402]  Ulcerative colitis with complication  unspecified location (H) [K51.919]  Acute deep vein thrombosis (DVT) of right lower extremity  unspecified vein (H) (Resolved) [I82.401]           Comments:  [employer] INR Monitoring patient. Monitor by exception. OK to leave DVM.         Anticoagulation Care Providers     Provider Role Specialty Phone number    Carter Skinner PA-C Referring [facility] [phone]

## 2022-12-26 NOTE — TELEPHONE ENCOUNTER
Dr Duenas, can you complete and close this encounter?  Thank you,  Viky Choi RN  [facility]

## 2023-01-09 ENCOUNTER — DOCUMENTATION ONLY (OUTPATIENT)
Dept: ANTICOAGULATION | Facility: CLINIC | Age: 53
End: 2023-01-09

## 2023-01-09 NOTE — PROGRESS NOTES
ANTICOAGULATION     Donato Brownkirk is overdue for INR check.      [employer] message sent.    Annette Preciado RN

## 2023-01-10 ENCOUNTER — DOCUMENTATION ONLY (OUTPATIENT)
Dept: ANTICOAGULATION | Facility: CLINIC | Age: 53
End: 2023-01-10

## 2023-01-10 DIAGNOSIS — I82.402 DEEP VEIN THROMBOSIS (DVT) OF LEFT LOWER EXTREMITY, UNSPECIFIED CHRONICITY, UNSPECIFIED VEIN (H): ICD-10-CM

## 2023-01-10 DIAGNOSIS — K51.919 ULCERATIVE COLITIS WITH COMPLICATION, UNSPECIFIED LOCATION (H): ICD-10-CM

## 2023-01-10 DIAGNOSIS — Z79.01 LONG TERM CURRENT USE OF ANTICOAGULANT THERAPY: Primary | ICD-10-CM

## 2023-01-10 LAB — INR HOME MONITORING: 2.8 (ref 2–3)

## 2023-01-10 NOTE — PROGRESS NOTES
ANTICOAGULATION  MANAGEMENT-Home Monitor Managed by Exception    Donato EUGENE De Leon 52 year old male is on warfarin with therapeutic INR result. (Goal INR 2.0-3.0)    Recent labs: (last 7 days)     01/10/23  0000   INR 2.8         Previous INR was Therapeutic    Medication, diet, health changes since last INR:chart reviewed; none identified    Contacted within the last 12 weeks by phone on 12/19/2022      JULEE Sewell was NOT contacted regarding therapeutic result today per home monitoring policy manage by exception agreement.   Current warfarin dose is to be continued:     Summary  As of 1/10/2023    Full warfarin instructions:  5 mg every Mon; 10 mg all other days   Next INR check:  1/24/2023           ?   Chanelle Molina, RN  Anticoagulation Clinic  1/10/2023    _______________________________________________________________________     Anticoagulation Episode Summary     Current INR goal:  2.0-3.0   TTR:  67.8 % (1 y)   Target end date:  Indefinite   Send INR reminders to:  [facility] HOME MONITORING    Indications    Deep vein thrombosis (DVT) (H) [I82.409] (Resolved) [I82.409]  Long-term (current) use of anticoagulants [Z79.01] [Z79.01]  Deep vein thrombosis (DVT) of left lower extremity  unspecified chronicity  unspecified vein (H) [I82.402]  Ulcerative colitis with complication  unspecified location (H) [K51.919]  Acute deep vein thrombosis (DVT) of right lower extremity  unspecified vein (H) (Resolved) [I82.401]           Comments:  [employer] [facility]R Monitoring patient. Monitor by exception. OK to leave DVM.         Anticoagulation Care Providers     Provider Role Specialty Phone number    Carter Skinner PA-C Referring Family Medicine [phone]

## 2023-02-01 ENCOUNTER — DOCUMENTATION ONLY (OUTPATIENT)
Dept: ANTICOAGULATION | Facility: CLINIC | Age: 53
End: 2023-02-01
Payer: COMMERCIAL

## 2023-02-01 DIAGNOSIS — Z79.01 LONG TERM CURRENT USE OF ANTICOAGULANT THERAPY: Primary | ICD-10-CM

## 2023-02-01 DIAGNOSIS — K51.919 ULCERATIVE COLITIS WITH COMPLICATION, UNSPECIFIED LOCATION (H): ICD-10-CM

## 2023-02-01 DIAGNOSIS — I82.402 DEEP VEIN THROMBOSIS (DVT) OF LEFT LOWER EXTREMITY, UNSPECIFIED CHRONICITY, UNSPECIFIED VEIN (H): ICD-10-CM

## 2023-02-01 LAB — INR HOME MONITORING: 2.1 (ref 2–3)

## 2023-02-01 NOTE — PROGRESS NOTES
ANTICOAGULATION  MANAGEMENT-Home Monitor Managed by Exception    Donato EUGENE De Leon 52 year old male is on warfarin with therapeutic INR result. (Goal INR 2.0-3.0)    Recent labs: (last 7 days)     02/01/23  0000   INR 2.1         Previous INR was Therapeutic    Medication, diet, health changes since last INR:chart reviewed; none identified    Contacted within the last 12 weeks by phone on 12/19/2022      JULEE Sewell was NOT contacted regarding therapeutic result today per home monitoring policy manage by exception agreement.   Current warfarin dose is to be continued:     Summary  As of 2/1/2023    Full warfarin instructions:  5 mg every Mon; 10 mg all other days   Next INR check:  2/15/2023           ?   Kia Domingo RN  Anticoagulation Clinic  2/1/2023    _______________________________________________________________________     Anticoagulation Episode Summary     Current INR goal:  2.0-3.0   TTR:  67.8 % (1 y)   Target end date:  Indefinite   Send INR reminders to:  [facility] HOME MONITORING    Indications    Deep vein thrombosis (DVT) (H) [I82.409] (Resolved) [I82.409]  Long-term (current) use of anticoagulants [Z79.01] [Z79.01]  Deep vein thrombosis (DVT) of left lower extremity  unspecified chronicity  unspecified vein (H) [I82.402]  Ulcerative colitis with complication  unspecified location (H) [K51.919]  Acute deep vein thrombosis (DVT) of right lower extremity  unspecified vein (H) (Resolved) [I82.401]           Comments:  [employer]R Monitoring patient. Monitor by exception. OK to leave DVM.         Anticoagulation Care Providers     Provider Role Specialty Phone number    Carter Skinner PA-C Referring Family Medicine [phone]

## 2023-02-01 NOTE — PROGRESS NOTES
ANTICOAGULATION     Donato EUGEEN De Leon is overdue for INR check.      Left message reminding patient to check INR with their home meter and call results to the home monitoring company as soon as possible.     Kia Domingo RN

## 2023-02-22 ENCOUNTER — TELEPHONE (OUTPATIENT)
Dept: ANTICOAGULATION | Facility: CLINIC | Age: 53
End: 2023-02-22
Payer: COMMERCIAL

## 2023-02-23 NOTE — PROGRESS NOTES
Subjective:  HPI  Physical Exam                    Objective:  System    Physical Exam    General     ROS    Assessment/Plan:    DISCHARGE REPORT    Progress reporting period is from 11/9/2022 to 11/30/2022.     SUBJECTIVE  Subjective: R felt better about a week later without pain.  L pain increased about 1 week later.  Felt like he stepped on something.  Feels like a stress fracture in his foot.  Wearing a boot at home to help protect it.  Unsure why pain increased again.   Current Pain level: No change   Initial Pain level: 2/10   Changes in function: No changes noted in function since last SOAP note   Adverse reactions: Activity:;   , Adverse reaction activity: pain increased in L foot from unknown etiology   Patient has failed to return to therapy so current objective findings are unknown.  The subjective and objective information are from the last SOAP note on this patient.    OBJECTIVE  Objective: tender base of 5th metatarsal      ASSESSMENT/PLAN  Updated problem list and treatment plan:Diagnosis 1:  L foot  Pain -  manual therapy, self management, education, directional preference exercise and home program  Decreased ROM/flexibility - manual therapy and therapeutic exercise  Impaired muscle performance - neuro re-education  Decreased function - therapeutic activities   STG/LTGs have been met or progress has been made towards goals:  unknown  Assessment of Progress: The patient has not returned to therapy. Current status is unknown.  Self Management Plans:  Patient has been instructed in a home treatment program.    Donato continues to require the following intervention to meet STG and LTG's: PT intervention is no longer required to meet STG/LTG.  The patient failed to complete scheduled/ordered appointments so current information is unknown.  We will discharge this patient from PT.    Recommendations:  Pt did not schedule any additional visits.  Discharge pt from PT.      Please refer to the daily flowsheet  for treatment today, total treatment time and time spent performing 1:1 timed codes.

## 2023-02-28 ENCOUNTER — ANTICOAGULATION THERAPY VISIT (OUTPATIENT)
Dept: ANTICOAGULATION | Facility: CLINIC | Age: 53
End: 2023-02-28
Payer: COMMERCIAL

## 2023-02-28 DIAGNOSIS — Z79.01 LONG TERM CURRENT USE OF ANTICOAGULANT THERAPY: Primary | ICD-10-CM

## 2023-02-28 DIAGNOSIS — K51.919 ULCERATIVE COLITIS WITH COMPLICATION, UNSPECIFIED LOCATION (H): ICD-10-CM

## 2023-02-28 DIAGNOSIS — I82.402 DEEP VEIN THROMBOSIS (DVT) OF LEFT LOWER EXTREMITY, UNSPECIFIED CHRONICITY, UNSPECIFIED VEIN (H): ICD-10-CM

## 2023-02-28 LAB — INR HOME MONITORING: 3.1 (ref 2–3)

## 2023-02-28 NOTE — PROGRESS NOTES
ANTICOAGULATION MANAGEMENT     Donato De Leon 52 year old male is on warfarin with supratherapeutic INR result. (Goal INR 2.0-3.0)    Recent labs: (last 7 days)     02/28/23  0000   INR 3.1*       ASSESSMENT       Source(s): Chart Review and Patient/Caregiver Call       Warfarin doses taken: Warfarin taken as instructed    Diet: No new diet changes identified    New illness, injury, or hospitalization: No    Medication/supplement changes: None noted    Signs or symptoms of bleeding or clotting: No    Previous INR: Therapeutic last 2(+) visits    Additional findings: None         PLAN     Recommended plan for no diet, medication or health factor changes affecting INR     Dosing Instructions: Continue your current warfarin dose with next INR in 2 weeks       Summary  As of 2/28/2023    Full warfarin instructions:  5 mg every Mon; 10 mg all other days   Next INR check:  3/14/2023             Telephone call with Jerry who verbalizes understanding and agrees to plan    Patient to recheck with home meter    Education provided:     None required    Plan made per [facility] anticoagulation protocol    Chanelle Molina RN  Anticoagulation Clinic  2/28/2023    _______________________________________________________________________     Anticoagulation Episode Summary     Current INR goal:  2.0-3.0   TTR:  69.0 % (1 y)   Target end date:  Indefinite   Send INR reminders to:  [facility] HOME MONITORING    Indications    Deep vein thrombosis (DVT) (H) [I82.409] (Resolved) [I82.409]  Long-term (current) use of anticoagulants [Z79.01] [Z79.01]  Deep vein thrombosis (DVT) of left lower extremity  unspecified chronicity  unspecified vein (H) [I82.402]  Ulcerative colitis with complication  unspecified location (H) [K51.919]  Acute deep vein thrombosis (DVT) of right lower extremity  unspecified vein (H) (Resolved) [I82.401]           Comments:  [employer] INR Monitoring patient. Monitor by exception. OK to leave DVM.          Anticoagulation Care Providers     Provider Role Specialty Phone number    Carter Skinner PA-C Referring Family Medicine [phone]

## 2023-03-19 LAB — INR HOME MONITORING: 2 (ref 2–3)

## 2023-03-20 ENCOUNTER — ANTICOAGULATION THERAPY VISIT (OUTPATIENT)
Dept: ANTICOAGULATION | Facility: CLINIC | Age: 53
End: 2023-03-20
Payer: COMMERCIAL

## 2023-03-20 DIAGNOSIS — Z79.01 LONG TERM CURRENT USE OF ANTICOAGULANT THERAPY: Primary | ICD-10-CM

## 2023-03-20 DIAGNOSIS — I82.402 DEEP VEIN THROMBOSIS (DVT) OF LEFT LOWER EXTREMITY, UNSPECIFIED CHRONICITY, UNSPECIFIED VEIN (H): ICD-10-CM

## 2023-03-20 DIAGNOSIS — K51.919 ULCERATIVE COLITIS WITH COMPLICATION, UNSPECIFIED LOCATION (H): ICD-10-CM

## 2023-03-20 NOTE — PROGRESS NOTES
ANTICOAGULATION MANAGEMENT     Donato De Leon 52 year old male is on warfarin with therapeutic INR result. (Goal INR 2.0-3.0)    Recent labs: (last 7 days)     03/19/23  0000   INR 2.0       ASSESSMENT       Source(s): Chart Review and Patient/Caregiver Call       Warfarin doses taken: Warfarin taken as instructed    Diet: No new diet changes identified    New illness, injury, or hospitalization: No    Medication/supplement changes: None noted    Signs or symptoms of bleeding or clotting: No    Previous INR: Supratherapeutic    Additional findings: None         PLAN     Recommended plan for no diet, medication or health factor changes affecting INR     Dosing Instructions: Continue your current warfarin dose with next INR in 2 weeks       Summary  As of 3/20/2023    Full warfarin instructions:  5 mg every Mon; 10 mg all other days   Next INR check:  4/3/2023             Telephone call with Jerry who verbalizes understanding and agrees to plan    Patient to recheck with home meter    Education provided:     Please call back if any changes to your diet, medications or how you've been taking warfarin    Resume manage by exception with home monitor. Continue to submit INR results to home monitor company.You will only be called when your result is out of range. Please call and notify [facility] if new medication started, dose missed, signs or symptoms of bleeding or clotting, or a surgery/procedure is scheduled.    Plan made per [facility] anticoagulation protocol    Kia Domingo RN  Anticoagulation Clinic  3/20/2023    _______________________________________________________________________     Anticoagulation Episode Summary     Current INR goal:  2.0-3.0   TTR:  68.9 % (1 y)   Target end date:  Indefinite   Send INR reminders to:  [facility] HOME MONITORING    Indications    Deep vein thrombosis (DVT) (H) [I82.409] (Resolved) [I82.409]  Long-term (current) use of anticoagulants [Z79.01] [Z79.01]  Deep vein thrombosis (DVT) of left  lower extremity  unspecified chronicity  unspecified vein (H) [I82.402]  Ulcerative colitis with complication  unspecified location (H) [K51.919]  Acute deep vein thrombosis (DVT) of right lower extremity  unspecified vein (H) (Resolved) [I82.401]           Comments:  [employer] [facility] INR Monitoring patient. Monitor by exception. OK to leave DVM.         Anticoagulation Care Providers     Provider Role Specialty Phone number    Carter Skinner PA-C Referring Family Medicine [phone]

## 2023-04-10 ENCOUNTER — DOCUMENTATION ONLY (OUTPATIENT)
Dept: ANTICOAGULATION | Facility: CLINIC | Age: 53
End: 2023-04-10
Payer: COMMERCIAL

## 2023-04-12 ENCOUNTER — ANTICOAGULATION THERAPY VISIT (OUTPATIENT)
Dept: ANTICOAGULATION | Facility: CLINIC | Age: 53
End: 2023-04-12
Payer: COMMERCIAL

## 2023-04-12 DIAGNOSIS — Z79.01 LONG TERM CURRENT USE OF ANTICOAGULANT THERAPY: Primary | ICD-10-CM

## 2023-04-12 DIAGNOSIS — K51.919 ULCERATIVE COLITIS WITH COMPLICATION, UNSPECIFIED LOCATION (H): ICD-10-CM

## 2023-04-12 DIAGNOSIS — I82.402 DEEP VEIN THROMBOSIS (DVT) OF LEFT LOWER EXTREMITY, UNSPECIFIED CHRONICITY, UNSPECIFIED VEIN (H): ICD-10-CM

## 2023-04-12 LAB — INR HOME MONITORING: 3.6 (ref 2–3)

## 2023-04-12 NOTE — PROGRESS NOTES
ANTICOAGULATION MANAGEMENT     Donato De Leon 53 year old male is on warfarin with supratherapeutic INR result. (Goal INR 2.0-3.0)    Recent labs: (last 7 days)     04/12/23  0000   INR 3.6*       ASSESSMENT       Source(s): Chart Review and Patient/Caregiver Call       Warfarin doses taken: Warfarin taken as instructed    Diet: No new diet changes identified    New illness, injury, or hospitalization: No    Medication/supplement changes: None noted    Signs or symptoms of bleeding or clotting: No    Previous INR: Therapeutic last visit; previously outside of goal range    Additional findings: None         PLAN     Recommended plan for no diet, medication or health factor changes affecting INR     Dosing Instructions: partial hold then continue your current warfarin dose with next INR in 1 week       Summary  As of 4/12/2023    Full warfarin instructions:  4/12: 5 mg; Otherwise 5 mg every Mon; 10 mg all other days   Next INR check:  4/19/2023             Telephone call with  pt who verbalizes understanding and agrees to plan    Patient to recheck with home meter    Education provided:     Please call back if any changes to your diet, medications or how you've been taking warfarin    Symptom monitoring: monitoring for bleeding signs and symptoms    Plan made per [facility] anticoagulation protocol    Makenzie Talamantes, RN  Anticoagulation Clinic  4/12/2023    _______________________________________________________________________     Anticoagulation Episode Summary     Current INR goal:  2.0-3.0   TTR:  66.5 % (1 y)   Target end date:  Indefinite   Send INR reminders to:  [facility] HOME MONITOR[facility]    Indications    Deep vein thrombosis (DVT) (H) [I82.409] (Resolved) [I82.409]  Long-term (current) use of anticoagulants [Z79.01] [Z79.01]  Deep vein thrombosis (DVT) of left lower extremity  unspecified chronicity  unspecified vein (H) [I82.402]  Ulcerative colitis with complication  unspecified location (H) [K51.479]  Acute  deep vein thrombosis (DVT) of right lower extremity  unspecified vein (H) (Resolved) [I82.401]           Comments:  [employer] [facility] INR Monitoring patient. Monitor by exception. OK to leave DVM.         Anticoagulation Care Providers     Provider Role Specialty Phone number    Carter Skinner PA-C Referring Family Medicine [phone]

## 2023-04-20 ENCOUNTER — DOCUMENTATION ONLY (OUTPATIENT)
Dept: ANTICOAGULATION | Facility: CLINIC | Age: 53
End: 2023-04-20
Payer: COMMERCIAL

## 2023-04-20 NOTE — PROGRESS NOTES
ANTICOAGULATION     Donato De Leon is overdue for INR check.   [employer] message sent     Annette Preciado RN

## 2023-04-24 ENCOUNTER — DOCUMENTATION ONLY (OUTPATIENT)
Dept: ANTICOAGULATION | Facility: CLINIC | Age: 53
End: 2023-04-24
Payer: COMMERCIAL

## 2023-04-24 DIAGNOSIS — I82.402 DEEP VEIN THROMBOSIS (DVT) OF LEFT LOWER EXTREMITY, UNSPECIFIED CHRONICITY, UNSPECIFIED VEIN (H): Primary | ICD-10-CM

## 2023-04-27 ENCOUNTER — TELEPHONE (OUTPATIENT)
Dept: ANTICOAGULATION | Facility: CLINIC | Age: 53
End: 2023-04-27
Payer: COMMERCIAL

## 2023-04-27 ENCOUNTER — ANTICOAGULATION THERAPY VISIT (OUTPATIENT)
Dept: ANTICOAGULATION | Facility: CLINIC | Age: 53
End: 2023-04-27
Payer: COMMERCIAL

## 2023-04-27 DIAGNOSIS — K51.919 ULCERATIVE COLITIS WITH COMPLICATION, UNSPECIFIED LOCATION (H): ICD-10-CM

## 2023-04-27 DIAGNOSIS — Z79.01 LONG TERM CURRENT USE OF ANTICOAGULANT THERAPY: Primary | ICD-10-CM

## 2023-04-27 DIAGNOSIS — I82.402 DEEP VEIN THROMBOSIS (DVT) OF LEFT LOWER EXTREMITY, UNSPECIFIED CHRONICITY, UNSPECIFIED VEIN (H): ICD-10-CM

## 2023-04-27 LAB — INR HOME MONITORING: 1.7 (ref 2–3)

## 2023-04-27 NOTE — PROGRESS NOTES
ANTICOAGULATION MANAGEMENT     Donato De Leon 53 year old male is on warfarin with subtherapeutic INR result. (Goal INR 2.0-3.0)    Recent labs: (last 7 days)     04/27/23  0000   INR 1.7*       ASSESSMENT       Source(s): Chart Review and Patient/Caregiver Call       Warfarin doses taken: Less warfarin taken than planned which may be affecting INR    Diet: No new diet changes identified    Medication/supplement changes: None noted    New illness, injury, or hospitalization: No    Signs or symptoms of bleeding or clotting: No    Previous result: Supratherapeutic    Additional findings: None         PLAN     Recommended plan for temporary change(s) affecting INR     Dosing Instructions: 4/27/23 booster dose, then continue current maintenance dose with next INR in 2 weeks     Summary  As of 4/27/2023    Full warfarin instructions:  4/27: 15 mg; Otherwise 5 mg every Mon; 10 mg all other days   Next INR check:  5/11/2023             Telephone call with Jerry who agrees to plan and repeated back plan correctly    Patient to recheck with home meter    Education provided:     Please call back if any changes to your diet, medications or how you've been taking warfarin    Goal range and lab monitoring: goal range and significance of current result    Plan made per [facility] anticoagulation protocol    Dotty Howell RN  Anticoagulation Clinic  4/27/2023    _______________________________________________________________________     Anticoagulation Episode Summary     Current INR goal:  2.0-3.0   TTR:  64.5 % (1 y)   Target end date:  Indefinite   Send INR reminders to:  [facility] MONITOR[facility]    Indications    Deep vein thrombosis (DVT) (H) [I82.409] (Resolved) [I82.409]  Long-term (current) use of anticoagulants [Z79.01] [Z79.01]  Deep vein thrombosis (DVT) of left lower extremity  unspecified chronicity  unspecified vein (H) [I82.402]  Ulcerative colitis with complication  unspecified location (H) [K51.161]  Acute deep vein  thrombosis (DVT) of right lower extremity  unspecified vein (H) (Resolved) [I82.401]           Comments:  [employer] [facility] INR Monitoring patient. Monitor by exception. OK to leave DVM.         Anticoagulation Care Providers     Provider Role Specialty Phone number    Carter Skinner PA-C Referring Family Medicine [phone]

## 2023-05-03 ENCOUNTER — TRANSFERRED RECORDS (OUTPATIENT)
Dept: HEALTH INFORMATION MANAGEMENT | Facility: CLINIC | Age: 53
End: 2023-05-03
Payer: COMMERCIAL

## 2023-05-03 LAB
ALT SERPL-CCNC: 40 IU/L (ref 0–44)
AST SERPL-CCNC: 35 IU/L (ref 0–40)

## 2023-05-16 ENCOUNTER — TRANSFERRED RECORDS (OUTPATIENT)
Dept: HEALTH INFORMATION MANAGEMENT | Facility: CLINIC | Age: 53
End: 2023-05-16
Payer: COMMERCIAL

## 2023-05-17 ENCOUNTER — ANTICOAGULATION THERAPY VISIT (OUTPATIENT)
Dept: ANTICOAGULATION | Facility: CLINIC | Age: 53
End: 2023-05-17
Payer: COMMERCIAL

## 2023-05-17 DIAGNOSIS — I82.402 DEEP VEIN THROMBOSIS (DVT) OF LEFT LOWER EXTREMITY, UNSPECIFIED CHRONICITY, UNSPECIFIED VEIN (H): ICD-10-CM

## 2023-05-17 DIAGNOSIS — K51.919 ULCERATIVE COLITIS WITH COMPLICATION, UNSPECIFIED LOCATION (H): ICD-10-CM

## 2023-05-17 DIAGNOSIS — Z79.01 LONG TERM CURRENT USE OF ANTICOAGULANT THERAPY: Primary | ICD-10-CM

## 2023-05-17 LAB — INR HOME MONITORING: 2.1 (ref 2–3)

## 2023-05-17 NOTE — PROGRESS NOTES
ANTICOAGULATION MANAGEMENT     Donato De Leon 53 year old male is on warfarin with therapeutic INR result. (Goal INR 2.0-3.0)    Recent labs: (last 7 days)     05/17/23  0000   INR 2.1       ASSESSMENT       Source(s): Chart Review    Previous INR was Subtherapeutic    Medication, diet, health changes since last INR chart reviewed; none identified        I left a detailed voicemail with the orders reflected in flowsheet. I have also requested a call back if there have been any missed doses, concerns, illness, fever, or if there have been any changes in medications, activity level, or diet       PLAN     Recommended plan for no diet, medication or health factor changes affecting INR     Dosing Instructions: Continue your current warfarin dose with next INR in 2 weeks       Summary  As of 5/17/2023    Full warfarin instructions:  5 mg every Mon; 10 mg all other days   Next INR check:  5/31/2023             Detailed voice message left for Jerry with dosing instructions and follow up date.     Lab visit scheduled    Education provided:     Please call back if any changes to your diet, medications or how you've been taking warfarin    Plan made per [facility] anticoagulation protocol    Makenzie Talamantes, RN  Anticoagulation Clinic  5/17/2023    _______________________________________________________________________     Anticoagulation Episode Summary     Current INR goal:  2.0-3.0   TTR:  60.5 % (1 y)   Target end date:  Indefinite   Send INR reminders to:  [facility] MONITOR[facility]    Indications    Deep vein thrombosis (DVT) (H) [I82.409] (Resolved) [I82.409]  Long-term (current) use of anticoagulants [Z79.01] [Z79.01]  Deep vein thrombosis (DVT) of left lower extremity  unspecified chronicity  unspecified vein (H) [I82.402]  Ulcerative colitis with complication  unspecified location (H) [K51.919]  Acute deep vein thrombosis (DVT) of right lower extremity  unspecified vein (H) (Resolved) [I82.401]           Comments:  [employer]  [facility] INR Monitoring patient. Monitor by exception. OK to leave DVM.         Anticoagulation Care Providers     Provider Role Specialty Phone number    Carter Skinner PA-C Referring Family Medicine [phone]

## 2023-05-23 DIAGNOSIS — E78.00 HIGH CHOLESTEROL: ICD-10-CM

## 2023-05-23 NOTE — LETTER
May 24, 2023    Donato De Leon  [address] [place] [address]    Dear Donato,       We recently received a refill request for atorvastatin (LIPITOR) 40 MG tablet.  We have refilled this for a one time 90 day supply only because you are due for a:    Medication Check office visit      Please call at your earliest convenience so that there will not be a delay with your future refills.          Thank you,   Your [facility] Team/AL  [phone]

## 2023-05-24 ENCOUNTER — MEDICAL CORRESPONDENCE (OUTPATIENT)
Dept: HEALTH INFORMATION MANAGEMENT | Facility: CLINIC | Age: 53
End: 2023-05-24
Payer: COMMERCIAL

## 2023-05-24 RX ORDER — ATORVASTATIN CALCIUM 40 MG/1
TABLET, FILM COATED ORAL
Qty: 90 TABLET | Refills: 0 | Status: SHIPPED | OUTPATIENT
Start: 2023-05-24 | End: 2023-10-18

## 2023-06-01 ENCOUNTER — TELEPHONE (OUTPATIENT)
Dept: ANTICOAGULATION | Facility: CLINIC | Age: 53
End: 2023-06-01
Payer: COMMERCIAL

## 2023-06-09 ENCOUNTER — DOCUMENTATION ONLY (OUTPATIENT)
Dept: ANTICOAGULATION | Facility: CLINIC | Age: 53
End: 2023-06-09
Payer: COMMERCIAL

## 2023-06-09 NOTE — PROGRESS NOTES
ANTICOAGULATION     Donato De Leon is overdue for INR check.     [employer] message sent     Annette Preciado RN

## 2023-06-12 ENCOUNTER — ANTICOAGULATION THERAPY VISIT (OUTPATIENT)
Dept: ANTICOAGULATION | Facility: CLINIC | Age: 53
End: 2023-06-12
Payer: COMMERCIAL

## 2023-06-12 DIAGNOSIS — I82.402 DEEP VEIN THROMBOSIS (DVT) OF LEFT LOWER EXTREMITY, UNSPECIFIED CHRONICITY, UNSPECIFIED VEIN (H): ICD-10-CM

## 2023-06-12 DIAGNOSIS — Z79.01 LONG TERM CURRENT USE OF ANTICOAGULANT THERAPY: Primary | ICD-10-CM

## 2023-06-12 DIAGNOSIS — K51.919 ULCERATIVE COLITIS WITH COMPLICATION, UNSPECIFIED LOCATION (H): ICD-10-CM

## 2023-06-12 LAB — INR HOME MONITORING: 4.4 (ref 2–3)

## 2023-06-12 NOTE — PROGRESS NOTES
ANTICOAGULATION MANAGEMENT     Donato De Leon 53 year old male is on warfarin with supratherapeutic INR result. (Goal INR 2.0-3.0)    Recent labs: (last 7 days)     06/12/23  0000   INR 4.4*       ASSESSMENT       Source(s): Chart Review and Patient/Caregiver Call       Warfarin doses taken: Warfarin taken as instructed    Diet: No new diet changes identified    Medication/supplement changes: has one week left of prednisone taper    New illness, injury, or hospitalization: Yes: patient was diagnosed with ulcerative colitis about 6 weeks ago and was on a prednisone taper    Signs or symptoms of bleeding or clotting: No    Previous result: Therapeutic last visit; previously outside of goal range    Additional findings: None     Patient already took dose this morning so unable to hold today's dose.         PLAN     Recommended plan for temporary change(s) affecting INR     Dosing Instructions: hold dose then continue your current warfarin dose with next INR in 1 week       Summary  As of 6/12/2023    Full warfarin instructions:  6/13: Hold; Otherwise 5 mg every Mon; 10 mg all other days   Next INR check:  6/19/2023             Telephone call with Jerry who verbalizes understanding and agrees to plan    Patient to recheck with home meter    Education provided:     Symptom monitoring: monitoring for bleeding signs and symptoms, when to seek medical attention/emergency care and if you hit your head or have a bad fall seek emergency care    Plan made per [facility] anticoagulation protocol    Maggie Varghese RN  Anticoagulation Clinic  6/12/2023    _______________________________________________________________________     Anticoagulation Episode Summary     Current INR goal:  2.0-3.0   TTR:  56.1 % (1 y)   Target end date:  Indefinite   Send INR reminders to:  [facility]IT[facility]    Indications    Deep vein thrombosis (DVT) (H) [I82.409] (Resolved) [I82.409]  Long-term (current) use of anticoagulants [Z79.01]  [Z79.01]  Deep vein thrombosis (DVT) of left lower extremity  unspecified chronicity  unspecified vein (H) [I82.402]  Ulcerative colitis with complication  unspecified location (H) [K51.919]  Acute deep vein thrombosis (DVT) of right lower extremity  unspecified vein (H) (Resolved) [I82.401]           Comments:  [employer] [facility] INR Monitoring patient. Monitor by exception. OK to leave DVM.         Anticoagulation Care Providers     Provider Role Specialty Phone number    Carter Skinner PA-C Referring Family Medicine [phone]

## 2023-06-20 ENCOUNTER — DOCUMENTATION ONLY (OUTPATIENT)
Dept: ANTICOAGULATION | Facility: CLINIC | Age: 53
End: 2023-06-20
Payer: COMMERCIAL

## 2023-06-20 NOTE — PROGRESS NOTES
ANTICOAGULATION     Donato De Leon is overdue for INR check.      [employer] message sent     Annette Preciado RN

## 2023-06-27 ENCOUNTER — ANTICOAGULATION THERAPY VISIT (OUTPATIENT)
Dept: ANTICOAGULATION | Facility: CLINIC | Age: 53
End: 2023-06-27
Payer: COMMERCIAL

## 2023-06-27 DIAGNOSIS — I82.402 DEEP VEIN THROMBOSIS (DVT) OF LEFT LOWER EXTREMITY, UNSPECIFIED CHRONICITY, UNSPECIFIED VEIN (H): ICD-10-CM

## 2023-06-27 DIAGNOSIS — K51.919 ULCERATIVE COLITIS WITH COMPLICATION, UNSPECIFIED LOCATION (H): ICD-10-CM

## 2023-06-27 DIAGNOSIS — Z79.01 LONG TERM CURRENT USE OF ANTICOAGULANT THERAPY: Primary | ICD-10-CM

## 2023-06-27 LAB — INR HOME MONITORING: 2 (ref 2–3)

## 2023-06-27 NOTE — PROGRESS NOTES
ANTICOAGULATION MANAGEMENT     Donato De Leon 53 year old male is on warfarin with therapeutic INR result. (Goal INR 2.0-3.0)    Recent labs: (last 7 days)     06/27/23  0000   INR 2.0       ASSESSMENT       Source(s): Chart Review    Previous INR was Supratherapeutic    Medication, diet, health changes since last INR chart reviewed; none identified             PLAN     Recommended plan for no diet, medication or health factor changes affecting INR     Dosing Instructions: Continue your current warfarin dose with next INR in 2 weeks       Summary  As of 6/27/2023    Full warfarin instructions:  5 mg every Mon; 10 mg all other days   Next INR check:  7/11/2023             Detailed voice message left for Jerry with dosing instructions and follow up date.     Patient to recheck with home meter    Education provided:     Please call back if any changes to your diet, medications or how you've been taking warfarin    Resume manage by exception with home monitor. Continue to submit INR results to home monitor company.You will only be called when your result is out of range. Please call and notify [facility] if new medication started, dose missed, signs or symptoms of bleeding or clotting, or a surgery/procedure is scheduled.    Contact [phone]  with any changes, questions or concerns.     call with additional information about possibly stopping warfarin after additional work up at [place].    Plan made per [facility] anticoagulation protocol    Chanelle Molina RN  Anticoagulation Clinic  6/27/2023    _______________________________________________________________________     Anticoagulation Episode Summary     Current INR goal:  2.0-3.0   TTR:  54.3 % (1 y)   Target end date:  Indefinite   Send INR reminders to:  [facility]IT[facility]    Indications    Deep vein thrombosis (DVT) (H) [I82.409] (Resolved) [I82.409]  Long-term (current) use of anticoagulants [Z79.01] [Z79.01]  Deep vein thrombosis (DVT) of left lower  extremity  unspecified chronicity  unspecified vein (H) [I82.402]  Ulcerative colitis with complication  unspecified location (H) [K51.919]  Acute deep vein thrombosis (DVT) of right lower extremity  unspecified vein (H) (Resolved) [I82.401]           Comments:  [employer] [facility] INR Monitoring patient. Monitor by exception. OK to leave DVM.         Anticoagulation Care Providers     Provider Role Specialty Phone number    Carter Skinner PA-C Referring Family Medicine [phone]

## 2023-07-18 ENCOUNTER — MYC MEDICAL ADVICE (OUTPATIENT)
Dept: ANTICOAGULATION | Facility: CLINIC | Age: 53
End: 2023-07-18
Payer: COMMERCIAL

## 2023-07-19 ENCOUNTER — ANTICOAGULATION THERAPY VISIT (OUTPATIENT)
Dept: ANTICOAGULATION | Facility: CLINIC | Age: 53
End: 2023-07-19
Payer: COMMERCIAL

## 2023-07-19 DIAGNOSIS — K51.919 ULCERATIVE COLITIS WITH COMPLICATION, UNSPECIFIED LOCATION (H): ICD-10-CM

## 2023-07-19 DIAGNOSIS — Z79.01 LONG TERM CURRENT USE OF ANTICOAGULANT THERAPY: Primary | ICD-10-CM

## 2023-07-19 DIAGNOSIS — I82.402 DEEP VEIN THROMBOSIS (DVT) OF LEFT LOWER EXTREMITY, UNSPECIFIED CHRONICITY, UNSPECIFIED VEIN (H): ICD-10-CM

## 2023-07-19 LAB — INR HOME MONITORING: 1.8 (ref 2–3)

## 2023-07-19 NOTE — PROGRESS NOTES
ANTICOAGULATION MANAGEMENT     Donato De Leon 53 year old male is on warfarin with subtherapeutic INR result. (Goal INR 2.0-3.0)    Recent labs: (last 7 days)     07/19/23  0000   INR 1.8*       ASSESSMENT       Source(s): Chart Review and Patient/Caregiver Call       Warfarin doses taken: Warfarin taken as instructed    Diet: Increased greens/vitamin K in diet; plans to resume previous intake    Medication/supplement changes: None noted    New illness, injury, or hospitalization: No    Signs or symptoms of bleeding or clotting: No    Previous result: Therapeutic last visit; previously outside of goal range    Additional findings: None         PLAN     Recommended plan for temporary change(s) affecting INR     Dosing Instructions: Continue your current warfarin dose with next INR in 2 weeks       Summary  As of 7/19/2023    Full warfarin instructions:  5 mg every Mon; 10 mg all other days   Next INR check:  8/2/2023             Telephone call with Jerry who verbalizes understanding and agrees to plan    Patient to recheck with home meter    Education provided:     Please call back if any changes to your diet, medications or how you've been taking warfarin    Plan made per [facility] anticoagulation protocol    Kia Domingo RN  Anticoagulation Clinic  7/19/2023    _______________________________________________________________________     Anticoagulation Episode Summary     Current INR goal:  2.0-3.0   TTR:  52.3 % (1 y)   Target end date:  Indefinite   Send INR reminders to:  [facility]IT[facility]    Indications    Deep vein thrombosis (DVT) (H) [I82.409] (Resolved) [I82.409]  Long-term (current) use of anticoagulants [Z79.01] [Z79.01]  Deep vein thrombosis (DVT) of left lower extremity  unspecified chronicity  unspecified vein (H) [I82.402]  Ulcerative colitis with complication  unspecified location (H) [K51.919]  Acute deep vein thrombosis (DVT) of right lower extremity  unspecified vein (H) (Resolved)  [I82.401]           Comments:  [employer] [facility] INR Monitoring patient. Monitor by exception. OK to leave DVM.         Anticoagulation Care Providers     Provider Role Specialty Phone number    Brody, Carter Romo PA-C Referring [facility] [phone]

## 2023-08-09 ENCOUNTER — MYC MEDICAL ADVICE (OUTPATIENT)
Dept: ANTICOAGULATION | Facility: CLINIC | Age: 53
End: 2023-08-09
Payer: COMMERCIAL

## 2023-08-10 ENCOUNTER — TRANSFERRED RECORDS (OUTPATIENT)
Dept: HEALTH INFORMATION MANAGEMENT | Facility: CLINIC | Age: 53
End: 2023-08-10
Payer: COMMERCIAL

## 2023-08-16 ENCOUNTER — TELEPHONE (OUTPATIENT)
Dept: ANTICOAGULATION | Facility: CLINIC | Age: 53
End: 2023-08-16
Payer: COMMERCIAL

## 2023-08-16 NOTE — TELEPHONE ENCOUNTER
ANTICOAGULATION     Donato EUGENE Kevinkirk is overdue for INR check.     Left message reminding patient to check INR with their home meter and call results to the home monitoring company as soon as possible.     Kiran Escalona RN

## 2023-08-17 ENCOUNTER — TELEPHONE (OUTPATIENT)
Dept: FAMILY MEDICINE | Facility: CLINIC | Age: 53
End: 2023-08-17
Payer: COMMERCIAL

## 2023-08-17 ENCOUNTER — ANTICOAGULATION THERAPY VISIT (OUTPATIENT)
Dept: ANTICOAGULATION | Facility: CLINIC | Age: 53
End: 2023-08-17
Payer: COMMERCIAL

## 2023-08-17 DIAGNOSIS — Z79.01 LONG TERM CURRENT USE OF ANTICOAGULANT THERAPY: Primary | ICD-10-CM

## 2023-08-17 DIAGNOSIS — K51.919 ULCERATIVE COLITIS WITH COMPLICATION, UNSPECIFIED LOCATION (H): ICD-10-CM

## 2023-08-17 DIAGNOSIS — I82.402 DEEP VEIN THROMBOSIS (DVT) OF LEFT LOWER EXTREMITY, UNSPECIFIED CHRONICITY, UNSPECIFIED VEIN (H): ICD-10-CM

## 2023-08-17 LAB — INR HOME MONITORING: 4 (ref 2–3)

## 2023-08-17 NOTE — PROGRESS NOTES
ANTICOAGULATION MANAGEMENT     Donato De Leon 53 year old male is on warfarin with supratherapeutic INR result. (Goal INR 2.0-3.0)    Recent labs: (last 7 days)     08/17/23  0000   INR 4.0*       ASSESSMENT     Source(s): Chart Review  Previous INR was Subtherapeutic  Medication, diet, health changes since last INR chart reviewed; none identified    Consulted with [facility]      PLAN     Unable to reach Jerry today.    Left message to call back to [facility]    Follow up required to confirm warfarin dose taken and assess for changes and discuss out of range result     Barrera Jeter RN  Anticoagulation Clinic  8/17/2023

## 2023-08-17 NOTE — TELEPHONE ENCOUNTER
Reason for Call:  Other call back    Detailed comments: pt returning inr call    Phone Number Patient can be reached at: Home number on file [phone] (home)    Best Time: any    Can we leave a detailed message on this number? Not Applicable    Call taken on 8/17/2023 at 1:14 PM by Viky Soares

## 2023-08-17 NOTE — PROGRESS NOTES
ANTICOAGULATION MANAGEMENT     Donato De Leon 53 year old male is on warfarin with supratherapeutic INR result. (Goal INR 2.0-3.0)    Recent labs: (last 7 days)     08/17/23  0000   INR 4.0*       ASSESSMENT     Source(s): Chart Review and Patient/Caregiver Call     Warfarin doses taken: Warfarin taken as instructed  Diet: No new diet changes identified  Medication/supplement changes: None noted  New illness, injury, or hospitalization: No  Signs or symptoms of bleeding or clotting: No  Previous result: Subtherapeutic  Additional findings: None       PLAN     Recommended plan for no diet, medication or health factor changes affecting INR     Dosing Instructions: decrease your warfarin dose (15.4% change) with next INR in 6 days   - pt had already taken warfarin today so maintenance dose adjustment will start tomorrow.     Summary  As of 8/17/2023      Full warfarin instructions:  5 mg every Mon, Wed, Fri; 10 mg all other days   Next INR check:  8/23/2023               Telephone call with Jerry who verbalizes understanding and agrees to plan    Patient to recheck with home meter    Education provided:   Goal range and lab monitoring: goal range and significance of current result and Importance of therapeutic range    Plan made with [facility] Pharmacist Radha Jeter, AISSATOU  Anticoagulation Clinic  8/17/2023    _______________________________________________________________________     Anticoagulation Episode Summary       Current INR goal:  2.0-3.0   TTR:  53.2 % (1 y)   Target end date:  Indefinite   Send INR reminders to:  [facility]    Indications    Deep vein thrombosis (DVT) (H) [I82.409] (Resolved) [I82.409]  Long-term (current) use of anticoagulants [Z79.01] [Z79.01]  Deep vein thrombosis (DVT) of left lower extremity  unspecified chronicity  unspecified vein (H) [I82.402]  Ulcerative colitis with complication  unspecified location (H) [K51.329]  Acute deep vein thrombosis (DVT) of right lower  extremity  unspecified vein (H) (Resolved) [I82.401]             Comments:  [employer] [facility] INR Monitoring patient. Monitor by exception. OK to leave DVM.             Anticoagulation Care Providers       Provider Role Specialty Phone number    Carter Skinner PA-C Referring Family Medicine [phone]

## 2023-08-21 DIAGNOSIS — E78.00 HIGH CHOLESTEROL: ICD-10-CM

## 2023-08-22 RX ORDER — ATORVASTATIN CALCIUM 40 MG/1
TABLET, FILM COATED ORAL
Qty: 90 TABLET | Refills: 3 | OUTPATIENT
Start: 2023-08-22

## 2023-08-23 DIAGNOSIS — I82.402 DEEP VEIN THROMBOSIS (DVT) OF LEFT LOWER EXTREMITY, UNSPECIFIED CHRONICITY, UNSPECIFIED VEIN (H): ICD-10-CM

## 2023-08-23 RX ORDER — WARFARIN SODIUM 5 MG/1
TABLET ORAL
Qty: 60 TABLET | Refills: 0 | Status: SHIPPED | OUTPATIENT
Start: 2023-08-23 | End: 2023-09-21

## 2023-08-25 ENCOUNTER — MYC MEDICAL ADVICE (OUTPATIENT)
Dept: ANTICOAGULATION | Facility: CLINIC | Age: 53
End: 2023-08-25
Payer: COMMERCIAL

## 2023-08-31 ENCOUNTER — ANTICOAGULATION THERAPY VISIT (OUTPATIENT)
Dept: ANTICOAGULATION | Facility: CLINIC | Age: 53
End: 2023-08-31
Payer: COMMERCIAL

## 2023-08-31 DIAGNOSIS — I82.402 DEEP VEIN THROMBOSIS (DVT) OF LEFT LOWER EXTREMITY, UNSPECIFIED CHRONICITY, UNSPECIFIED VEIN (H): ICD-10-CM

## 2023-08-31 DIAGNOSIS — Z79.01 LONG TERM CURRENT USE OF ANTICOAGULANT THERAPY: Primary | ICD-10-CM

## 2023-08-31 DIAGNOSIS — K51.919 ULCERATIVE COLITIS WITH COMPLICATION, UNSPECIFIED LOCATION (H): ICD-10-CM

## 2023-08-31 LAB — INR HOME MONITORING: 2.2 (ref 2–3)

## 2023-08-31 NOTE — PROGRESS NOTES
ANTICOAGULATION MANAGEMENT     Donato De Leon 53 year old male is on warfarin with therapeutic INR result. (Goal INR 2.0-3.0)    Recent labs: (last 7 days)     08/31/23  0000   INR 2.2       ASSESSMENT     Source(s): Chart Review  Previous INR was Supratherapeutic  Medication, diet, health changes since last INR chart reviewed; none identified         PLAN     Recommended plan for no diet, medication or health factor changes affecting INR     Dosing Instructions: Continue your current warfarin dose with next INR in 2 weeks       Summary  As of 8/31/2023      Full warfarin instructions:  5 mg every Mon, Wed, Fri; 10 mg all other days   Next INR check:  9/14/2023               Detailed voice message left for Jerry with dosing instructions and follow up date.   Sent [employer] message with dosing and follow up instructions    Patient to recheck with home meter    Education provided:   Please call back if any changes to your diet, medications or how you've been taking warfarin  Resume manage by exception with home monitor. Continue to submit INR results to home monitor company.You will only be called when your result is out of range. Please call and notify [facility] if new medication started, dose missed, signs or symptoms of bleeding or clotting, or a surgery/procedure is scheduled.  Contact [phone]  with any changes, questions or concerns.     Plan made per [facility] anticoagulation protocol    Alla Colmenares RN  Anticoagulation Clinic  8/31/2023    _______________________________________________________________________     Anticoagulation Episode Summary       Current INR goal:  2.0-3.0   TTR:  51.8 % (1 y)   Target end date:  Indefinite   Send INR reminders to:  [facility] HOME MONITORING    Indications    Deep vein thrombosis (DVT) (H) [I82.409] (Resolved) [I82.409]  Long-term (current) use of anticoagulants [Z79.01] [Z79.01]  Deep vein thrombosis (DVT) of left lower extremity  unspecified chronicity  unspecified vein (H)  [I82.402]  Ulcerative colitis with complication  unspecified location (H) [K51.919]  Acute deep vein thrombosis (DVT) of right lower extremity  unspecified vein (H) (Resolved) [I82.401]             Comments:  [employer] [facility] INR Monitoring patient. Monitor by exception. OK to leave DVM.             Anticoagulation Care Providers       Provider Role Specialty Phone number    Carter Skinner PA-C Referring [facility] [phone]

## 2023-09-21 DIAGNOSIS — I82.402 DEEP VEIN THROMBOSIS (DVT) OF LEFT LOWER EXTREMITY, UNSPECIFIED CHRONICITY, UNSPECIFIED VEIN (H): ICD-10-CM

## 2023-09-21 RX ORDER — WARFARIN SODIUM 5 MG/1
TABLET ORAL
Qty: 48 TABLET | Refills: 1 | Status: SHIPPED | OUTPATIENT
Start: 2023-09-21 | End: 2023-11-27

## 2023-09-21 NOTE — TELEPHONE ENCOUNTER
ANTICOAGULATION MANAGEMENT:  Medication Refill    Anticoagulation Summary  As of 8/31/2023      Warfarin maintenance plan:  5 mg (5 mg x 1) every Mon, Wed, Fri; 10 mg (5 mg x 2) all other days   Next INR check:  9/14/2023   Target end date:  Indefinite    Indications    Deep vein thrombosis (DVT) (H) [I82.409] (Resolved) [I82.409]  Long-term (current) use of anticoagulants [Z79.01] [Z79.01]  Deep vein thrombosis (DVT) of left lower extremity  unspecified chronicity  unspecified vein (H) [I82.402]  Ulcerative colitis with complication  unspecified location (H) [K51.919]  Acute deep vein thrombosis (DVT) of right lower extremity  unspecified vein (H) (Resolved) [I82.401]                 Anticoagulation Care Providers       Provider Role Specialty Phone number    Carter Skinner PA-C Referring Family Medicine [phone]            Refill Criteria    Visit with referring provider/group: Overdue for referring provider visit, last visit on 7/5/2022    [facility] referral signed last signed: 04/25/2023; within last year: Yes    Lab monitoring not exceeding 2 weeks overdue: Yes    Donato does NOT meet all criteria for refill: Visit with referring provider's group was >= 1 year ago. kinza fill previously given on 8/23/2023; routing to referring provider for further refills  per [facility] protocol    Ivy Bob RN  Anticoagulation Clinic

## 2023-09-22 ENCOUNTER — MYC MEDICAL ADVICE (OUTPATIENT)
Dept: ANTICOAGULATION | Facility: CLINIC | Age: 53
End: 2023-09-22
Payer: COMMERCIAL

## 2023-09-25 LAB — INR HOME MONITORING: 2.5 (ref 2–3)

## 2023-09-26 ENCOUNTER — DOCUMENTATION ONLY (OUTPATIENT)
Dept: ANTICOAGULATION | Facility: CLINIC | Age: 53
End: 2023-09-26
Payer: COMMERCIAL

## 2023-09-26 DIAGNOSIS — I82.402 DEEP VEIN THROMBOSIS (DVT) OF LEFT LOWER EXTREMITY, UNSPECIFIED CHRONICITY, UNSPECIFIED VEIN (H): ICD-10-CM

## 2023-09-26 DIAGNOSIS — K51.919 ULCERATIVE COLITIS WITH COMPLICATION, UNSPECIFIED LOCATION (H): ICD-10-CM

## 2023-09-26 DIAGNOSIS — Z79.01 LONG TERM CURRENT USE OF ANTICOAGULANT THERAPY: Primary | ICD-10-CM

## 2023-09-26 NOTE — PROGRESS NOTES
ANTICOAGULATION  MANAGEMENT-Home Monitor Managed by Exception    Donato EUGENE De Leon 53 year old male is on warfarin with therapeutic INR result. (Goal INR 2.0-3.0)    Recent labs: (last 7 days)     09/25/23  0000   INR 2.5       Previous INR was Therapeutic  Medication, diet, health changes since last INR:chart reviewed; none identified  Contacted within the last 12 weeks by phone on 8/31/2023      JULEE Sewell was NOT contacted regarding therapeutic result today per home monitoring policy manage by exception agreement.   Current warfarin dose is to be continued:     Summary  As of 9/26/2023      Full warfarin instructions:  5 mg every Mon, Wed, Fri; 10 mg all other days   Next INR check:  10/2/2023             ?   April Alvarado RN  Anticoagulation Clinic  9/26/2023    _______________________________________________________________________     Anticoagulation Episode Summary       Current INR goal:  2.0-3.0   TTR:  58.8 % (1 y)   Target end date:  Indefinite   Send INR reminders to:  ANTICOAG HOME MONITORING    Indications    Deep vein thrombosis (DVT) (H) [I82.409] (Resolved) [I82.409]  Long-term (current) use of anticoagulants [Z79.01] [Z79.01]  Deep vein thrombosis (DVT) of left lower extremity  unspecified chronicity  unspecified vein (H) [I82.402]  Ulcerative colitis with complication  unspecified location (H) [K51.919]  Acute deep vein thrombosis (DVT) of right lower extremity  unspecified vein (H) (Resolved) [I82.401]             Comments:  [employer] [facility] INR Monitoring patient. Monitor by exception. OK to leave DVM.             Anticoagulation Care Providers       Provider Role Specialty Phone number    Carter Skinner PA-C Referring Family Medicine [phone]

## 2023-10-06 NOTE — TELEPHONE ENCOUNTER
No results received from INR  Home monitor since 4/8/20. Left message on voicemail for patient to check INR and to call me if questions or changes.  Lyudmila Good RN     No

## 2023-10-16 ENCOUNTER — DOCUMENTATION ONLY (OUTPATIENT)
Dept: ANTICOAGULATION | Facility: CLINIC | Age: 53
End: 2023-10-16
Payer: COMMERCIAL

## 2023-10-16 NOTE — PROGRESS NOTES
ANTICOAGULATION     Donato De Leon is overdue for an INR check.     [employer] message sent     Annette Preciado RN

## 2023-10-18 ENCOUNTER — OFFICE VISIT (OUTPATIENT)
Dept: FAMILY MEDICINE | Facility: CLINIC | Age: 53
End: 2023-10-18
Payer: COMMERCIAL

## 2023-10-18 ENCOUNTER — ANTICOAGULATION THERAPY VISIT (OUTPATIENT)
Dept: ANTICOAGULATION | Facility: CLINIC | Age: 53
End: 2023-10-18

## 2023-10-18 VITALS
OXYGEN SATURATION: 96 % | SYSTOLIC BLOOD PRESSURE: 124 MMHG | BODY MASS INDEX: 36.57 KG/M2 | TEMPERATURE: 97.3 F | RESPIRATION RATE: 16 BRPM | DIASTOLIC BLOOD PRESSURE: 85 MMHG | WEIGHT: 270 LBS | HEIGHT: 72 IN | HEART RATE: 90 BPM

## 2023-10-18 DIAGNOSIS — E78.00 HIGH CHOLESTEROL: ICD-10-CM

## 2023-10-18 DIAGNOSIS — Z79.01 LONG TERM CURRENT USE OF ANTICOAGULANT THERAPY: Primary | ICD-10-CM

## 2023-10-18 DIAGNOSIS — Z00.00 ROUTINE GENERAL MEDICAL EXAMINATION AT A HEALTH CARE FACILITY: Primary | ICD-10-CM

## 2023-10-18 DIAGNOSIS — E66.01 MORBID OBESITY (H): ICD-10-CM

## 2023-10-18 DIAGNOSIS — Z79.01 LONG TERM CURRENT USE OF ANTICOAGULANT THERAPY: ICD-10-CM

## 2023-10-18 DIAGNOSIS — I82.402 DEEP VEIN THROMBOSIS (DVT) OF LEFT LOWER EXTREMITY, UNSPECIFIED CHRONICITY, UNSPECIFIED VEIN (H): ICD-10-CM

## 2023-10-18 DIAGNOSIS — R06.83 SNORES: ICD-10-CM

## 2023-10-18 DIAGNOSIS — K51.919 ULCERATIVE COLITIS WITH COMPLICATION, UNSPECIFIED LOCATION (H): ICD-10-CM

## 2023-10-18 LAB — INR BLD: 1.9 (ref 0.9–1.1)

## 2023-10-18 PROCEDURE — 85610 PROTHROMBIN TIME: CPT | Performed by: PHYSICIAN ASSISTANT

## 2023-10-18 PROCEDURE — 99396 PREV VISIT EST AGE 40-64: CPT | Performed by: PHYSICIAN ASSISTANT

## 2023-10-18 PROCEDURE — 80053 COMPREHEN METABOLIC PANEL: CPT | Performed by: PHYSICIAN ASSISTANT

## 2023-10-18 PROCEDURE — 80061 LIPID PANEL: CPT | Performed by: PHYSICIAN ASSISTANT

## 2023-10-18 PROCEDURE — 36415 COLL VENOUS BLD VENIPUNCTURE: CPT | Performed by: PHYSICIAN ASSISTANT

## 2023-10-18 RX ORDER — ATORVASTATIN CALCIUM 40 MG/1
40 TABLET, FILM COATED ORAL DAILY
Qty: 90 TABLET | Refills: 3 | Status: SHIPPED | OUTPATIENT
Start: 2023-10-18 | End: 2024-10-04

## 2023-10-18 ASSESSMENT — ENCOUNTER SYMPTOMS
JOINT SWELLING: 0
FREQUENCY: 0
COUGH: 0
ARTHRALGIAS: 0
DYSURIA: 0
ABDOMINAL PAIN: 0
EYE PAIN: 0
WEAKNESS: 0
FEVER: 0
PARESTHESIAS: 0
SORE THROAT: 0
PALPITATIONS: 0
NAUSEA: 0
HEMATOCHEZIA: 0
CHILLS: 0
DIZZINESS: 0
MYALGIAS: 0
HEMATURIA: 0
CONSTIPATION: 0
NERVOUS/ANXIOUS: 0
HEADACHES: 0
DIARRHEA: 0
HEARTBURN: 0
SHORTNESS OF BREATH: 0

## 2023-10-18 ASSESSMENT — PAIN SCALES - GENERAL: PAINLEVEL: NO PAIN (0)

## 2023-10-18 NOTE — PROGRESS NOTES
ANTICOAGULATION MANAGEMENT     Donato De Leon 53 year old male is on warfarin with subtherapeutic INR result. (Goal INR 2.0-3.0)    Recent labs: (last 7 days)     10/18/23  1441   INR 1.9*       ASSESSMENT     Source(s): Chart Review and Patient/Caregiver Call     Warfarin doses taken: Warfarin taken as instructed  Diet: No new diet changes identified  Medication/supplement changes: None noted  New illness, injury, or hospitalization: No  Signs or symptoms of bleeding or clotting: No  Previous result: Therapeutic last 2(+) visits  Additional findings: None       PLAN     Recommended plan for no diet, medication or health factor changes affecting INR     Dosing Instructions: Continue your current warfarin dose with next INR in 2 weeks       Summary  As of 10/18/2023      Full warfarin instructions:  5 mg every Mon, Wed, Fri; 10 mg all other days   Next INR check:  11/1/2023               Detailed voice message left for Jerry with dosing instructions and follow up date.     Patient to recheck with home meter    Education provided:   Please call back if any changes to your diet, medications or how you've been taking warfarin    Plan made per [facility] anticoagulation protocol    Eliza Donovan, RN  Anticoagulation Clinic  10/18/2023    _______________________________________________________________________     Anticoagulation Episode Summary       Current INR goal:  2.0-3.0   TTR:  63.9% (1 y)   Target end date:  Indefinite   Send INR reminders to:  ANTICOAG HOME MONITORING    Indications    Deep vein thrombosis (DVT) (H) [I82.409] (Resolved) [I82.409]  Long-term (current) use of anticoagulants [Z79.01] [Z79.01]  Deep vein thrombosis (DVT) of left lower extremity  unspecified chronicity  unspecified vein (H) [I82.402]  Ulcerative colitis with complication  unspecified location (H) [K51.919]  Acute deep vein thrombosis (DVT) of right lower extremity  unspecified vein (H) (Resolved) [I82.401]             Comments:  [employer]  Home INR Monitoring patient. Monitor by exception. OK to leave DVM.             Anticoagulation Care Providers       Provider Role Specialty Phone number    Carter Skinner PA-C Referring Family Medicine [phone]

## 2023-10-18 NOTE — PROGRESS NOTES
SUBJECTIVE:   CC: Jerry is an 53 year old who presents for preventative health visit.       10/18/2023     2:17 PM   Additional Questions   Roomed by Jose Armando BARAKAT MA       Healthy Habits:     Getting at least 3 servings of Calcium per day:  Yes    Bi-annual eye exam:  Yes    Dental care twice a year:  Yes    Sleep apnea or symptoms of sleep apnea:  Excessive snoring    Diet:  Regular (no restrictions)    Frequency of exercise:  6-7 days/week    Duration of exercise:  30-45 minutes    Taking medications regularly:  Yes    Medication side effects:  None    Additional concerns today:  No  Hyperlipidemia Follow-Up    Are you regularly taking any medication or supplement to lower your cholesterol?   Yes- lipitor  ran out of meds about 2 wks ago  Are you having muscle aches or other side effects that you think could be caused by your cholesterol lowering medication?  No  He has a history of ulcerative colitis and secondary DVT and long-term anticoagulation therapy.  He follows up with GI regularly.    History of snoring and occ waking himself up.     Today's PHQ-2 Score:       10/18/2023     2:18 PM   PHQ-2 ( 1999 [employer])   Q1: Little interest or pleasure in doing things 0   Q2: Feeling down, depressed or hopeless 0   PHQ-2 Score 0   Q1: Little interest or pleasure in doing things Not at all   Q2: Feeling down, depressed or hopeless Not at all   PHQ-2 Score 0               Have you ever done Advance Care Planning? (For example, a Health Directive, POLST, or a discussion with a medical provider or your loved ones about your wishes): No, advance care planning information given to patient to review.  Patient declined advance care planning discussion at this time.    Social History     Tobacco Use    Smoking status: Never    Smokeless tobacco: Never   Substance Use Topics    Alcohol use: Yes     Comment: rare             10/18/2023     2:18 PM   Alcohol Use   Prescreen: >3 drinks/day or >7 drinks/week? No          No data to display  "               Last PSA: No results found for: \"PSA\"    Reviewed orders with patient. Reviewed health maintenance and updated orders accordingly - Yes  Lab work is in process  Labs reviewed in EPIC  BP Readings from Last 3 Encounters:   10/18/23 124/85   09/09/22 (!) 150/104   08/05/22 (!) 132/100    Wt Readings from Last 3 Encounters:   10/18/23 122.5 kg (270 lb)   09/09/22 117.9 kg (260 lb)   08/05/22 117.9 kg (260 lb)                  Patient Active Problem List   Diagnosis    CARDIOVASCULAR SCREENING; LDL GOAL LESS THAN 160    Cellulitis of arm, right    Plantar fasciitis    Renal lithiasis    Gouty arthritis of toe of right foot    BMI 32.0-32.9,adult    Ulcerative colitis with complication, unspecified location (H)    Long-term (current) use of anticoagulants [Z79.01]    Acute gout of right foot, unspecified cause    Deep vein thrombosis (DVT) of left lower extremity, unspecified chronicity, unspecified vein (H)    Elevated LFTs    High cholesterol    Morbid obesity (H)    Left foot pain     Past Surgical History:   Procedure Laterality Date    HC FRAGMENTING OF KIDNEY STONE      SKIN CANCER SCREENING      basal cell        Social History     Tobacco Use    Smoking status: Never    Smokeless tobacco: Never   Substance Use Topics    Alcohol use: Yes     Comment: rare     Family History   Problem Relation Age of Onset    Clotting Disorder Sister          Current Outpatient Medications   Medication Sig Dispense Refill    Acetaminophen (TYLENOL PO) Take by mouth as needed for mild pain or fever      atorvastatin (LIPITOR) 40 MG tablet Take 1 tablet (40 mg) by mouth daily 90 tablet 3    warfarin ANTICOAGULANT (COUMADIN) 5 MG tablet TAKE 1 TABLET BY MOUTH ON MONDAYS, WEDNESDAYS, FRIDAYS AND TAKE 2 TABLETS ALL OTHER DAYS OR AS DIRECTED BY THE INR CLINIC 48 tablet 1     Allergies   Allergen Reactions    Nkda [No Known Drug Allergy]        Reviewed and updated as needed this visit by clinical staff   Tobacco  " Allergies  Meds  Problems  Med Hx  Surg Hx  Fam Hx          Reviewed and updated as needed this visit by Provider   Tobacco  Allergies  Meds  Problems  Med Hx  Surg Hx  Fam Hx           Past Medical History:   Diagnosis Date    Acute gout of right foot, unspecified cause 5/3/2017    Colitis, ulcerative (H)     Deep vein thrombosis (DVT) of left lower extremity, unspecified chronicity, unspecified vein (H) 7/11/2017    Long-term (current) use of anticoagulants [Z79.01] 3/29/2017    Renal lithiasis       Past Surgical History:   Procedure Laterality Date    HC FRAGMENTING OF KIDNEY STONE      SKIN CANCER SCREENING      basal cell        Review of Systems   Constitutional:  Negative for chills and fever.   HENT:  Negative for congestion, ear pain, hearing loss and sore throat.    Eyes:  Negative for pain and visual disturbance.   Respiratory:  Negative for cough and shortness of breath.    Cardiovascular:  Negative for chest pain, palpitations and peripheral edema.   Gastrointestinal:  Negative for abdominal pain, constipation, diarrhea, heartburn, hematochezia and nausea.   Genitourinary:  Negative for dysuria, frequency, genital sores, hematuria, impotence, penile discharge and urgency.   Musculoskeletal:  Negative for arthralgias, joint swelling and myalgias.   Skin:  Negative for rash.   Neurological:  Negative for dizziness, weakness, headaches and paresthesias.   Psychiatric/Behavioral:  Negative for mood changes. The patient is not nervous/anxious.        OBJECTIVE:   /85   Pulse 90   Temp 97.3  F (36.3  C) (Tympanic)   Resp 16   Ht 1.829 m (6')   Wt 122.5 kg (270 lb)   SpO2 96%   BMI 36.62 kg/m      Physical Exam  GENERAL: healthy, alert and no distress  EYES: Eyes grossly normal to inspection, PERRL and conjunctivae and sclerae normal  HENT: ear canals and TM's normal, nose and mouth without ulcers or lesions  NECK: no adenopathy, no asymmetry, masses, or scars and thyroid normal to  palpation  RESP: lungs clear to auscultation - no rales, rhonchi or wheezes  CV: regular rate and rhythm, normal S1 S2, no S3 or S4, no murmur, click or rub, no peripheral edema and peripheral pulses strong  ABDOMEN: soft, nontender, no hepatosplenomegaly, no masses and bowel sounds normal  MS: no gross musculoskeletal defects noted, no edema  SKIN: no suspicious lesions or rashes  NEURO: Normal strength and tone, mentation intact and speech normal  PSYCH: mentation appears normal, affect normal/bright    Diagnostic Test Results:  Labs reviewed in Epic    ASSESSMENT/PLAN:       ICD-10-CM    1. Routine general medical examination at a health care facility  Z00.00 Comprehensive metabolic panel (BMP + Alb, Alk Phos, ALT, AST, Total. Bili, TP)     Comprehensive metabolic panel (BMP + Alb, Alk Phos, ALT, AST, Total. Bili, TP)      2. High cholesterol  E78.00 atorvastatin (LIPITOR) 40 MG tablet     Lipid panel reflex to direct LDL Fasting     Lipid panel reflex to direct LDL Fasting      3. Snores  R06.83 Adult Sleep Eval & Management Referral      4. Morbid obesity (H)  E66.01 Adult Comprehensive Weight Management  Referral      5. Ulcerative colitis with complication, unspecified location (H)  K51.919       6. Deep vein thrombosis (DVT) of left lower extremity, unspecified chronicity, unspecified vein (H)  I82.402 INR point of care      7. Long-term (current) use of anticoagulants [Z79.01]  Z79.01         Talk to patient about his concerns.  We talked about diet and exercise.  Referral to weight management was given.  Does have a history of snoring we will refer him to the sleep clinic also.  He has been off his Lipitor for couple weeks he wanted to see what his lab results are before starting back on the Lipitor again.  He will continue care with his specialist for his ulcerative colitis.        COUNSELING:   Reviewed preventive health counseling, as reflected in patient instructions       Regular exercise        Healthy diet/nutrition      BMI:   Estimated body mass index is 36.62 kg/m  as calculated from the following:    Height as of this encounter: 1.829 m (6').    Weight as of this encounter: 122.5 kg (270 lb).   Weight management plan: Patient referred to endocrine and/or weight management specialty      He reports that he has never smoked. He has never used smokeless tobacco.            JOHN Tomas [facility]

## 2023-10-18 NOTE — NURSING NOTE
Chief Complaint   Patient presents with    Physical       Initial /85   Pulse 90   Temp 97.3  F (36.3  C) (Tympanic)   Resp 16   Ht 1.829 m (6')   Wt 122.5 kg (270 lb)   SpO2 96%   BMI 36.62 kg/m   Estimated body mass index is 36.62 kg/m  as calculated from the following:    Height as of this encounter: 1.829 m (6').    Weight as of this encounter: 122.5 kg (270 lb).  Medication Reconciliation: georgia Otoole MA

## 2023-10-19 ENCOUNTER — TELEPHONE (OUTPATIENT)
Dept: FAMILY MEDICINE | Facility: CLINIC | Age: 53
End: 2023-10-19
Payer: COMMERCIAL

## 2023-10-19 LAB
ALBUMIN SERPL BCG-MCNC: 4.9 G/DL (ref 3.5–5.2)
ALP SERPL-CCNC: 55 U/L (ref 40–129)
ALT SERPL W P-5'-P-CCNC: 32 U/L (ref 0–70)
ANION GAP SERPL CALCULATED.3IONS-SCNC: 11 MMOL/L (ref 7–15)
AST SERPL W P-5'-P-CCNC: 29 U/L (ref 0–45)
BILIRUB SERPL-MCNC: 0.5 MG/DL
BUN SERPL-MCNC: 18.9 MG/DL (ref 6–20)
CALCIUM SERPL-MCNC: 9.6 MG/DL (ref 8.6–10)
CHLORIDE SERPL-SCNC: 103 MMOL/L (ref 98–107)
CHOLEST SERPL-MCNC: 251 MG/DL
CREAT SERPL-MCNC: 1.31 MG/DL (ref 0.67–1.17)
DEPRECATED HCO3 PLAS-SCNC: 25 MMOL/L (ref 22–29)
EGFRCR SERPLBLD CKD-EPI 2021: 65 ML/MIN/1.73M2
GLUCOSE SERPL-MCNC: 92 MG/DL (ref 70–99)
HDLC SERPL-MCNC: 71 MG/DL
LDLC SERPL CALC-MCNC: 170 MG/DL
NONHDLC SERPL-MCNC: 180 MG/DL
POTASSIUM SERPL-SCNC: 5.1 MMOL/L (ref 3.4–5.3)
PROT SERPL-MCNC: 7.7 G/DL (ref 6.4–8.3)
SODIUM SERPL-SCNC: 139 MMOL/L (ref 135–145)
TRIGL SERPL-MCNC: 49 MG/DL

## 2023-10-19 NOTE — TELEPHONE ENCOUNTER
Donato- Your form has been completed and faxed to [employer].   If you would like the original form, you can pick it up at the  at [facility].

## 2023-10-30 ENCOUNTER — ANTICOAGULATION THERAPY VISIT (OUTPATIENT)
Dept: ANTICOAGULATION | Facility: CLINIC | Age: 53
End: 2023-10-30
Payer: COMMERCIAL

## 2023-10-30 DIAGNOSIS — K51.919 ULCERATIVE COLITIS WITH COMPLICATION, UNSPECIFIED LOCATION (H): ICD-10-CM

## 2023-10-30 DIAGNOSIS — Z79.01 LONG TERM CURRENT USE OF ANTICOAGULANT THERAPY: Primary | ICD-10-CM

## 2023-10-30 DIAGNOSIS — I82.402 DEEP VEIN THROMBOSIS (DVT) OF LEFT LOWER EXTREMITY, UNSPECIFIED CHRONICITY, UNSPECIFIED VEIN (H): ICD-10-CM

## 2023-10-30 LAB — INR HOME MONITORING: 1.9 (ref 2–3)

## 2023-10-30 NOTE — PROGRESS NOTES
"This result is from 10/18/23.      ANTICOAGULATION MANAGEMENT     Donato De Leon 53 year old male is on warfarin with subtherapeutic INR result. (Goal INR 2.0-3.0)    No results for input(s): \"INR\" in the last 168 hours.    ASSESSMENT     Source(s): Chart Review and Patient/Caregiver Call     Warfarin doses taken: Warfarin taken as instructed  Diet: No new diet changes identified  Medication/supplement changes: None noted  New illness, injury, or hospitalization: No  Signs or symptoms of bleeding or clotting: No  Previous result: Therapeutic last 2(+) visits  Additional findings: None       PLAN     Recommended plan for no diet, medication or health factor changes affecting INR     Dosing Instructions: Continue your current warfarin dose with next INR in 2 days       Summary  As of 10/30/2023      Full warfarin instructions:  5 mg every Mon, Wed, Fri; 10 mg all other days   Next INR check:  11/1/2023               Sent [employer] message with dosing and follow up instructions    Patient to recheck with home meter    Education provided:   Please call back if any changes to your diet, medications or how you've been taking warfarin    Plan made per [facility] anticoagulation protocol    Eliza Donovan, RN  Anticoagulation Clinic  10/30/2023    _______________________________________________________________________     Anticoagulation Episode Summary       Current INR goal:  2.0-3.0   TTR:  64.5% (11.8 mo)   Target end date:  Indefinite   Send INR reminders to:  ANTICOAG HOME MONITORING    Indications    Deep vein thrombosis (DVT) (H) [I82.409] (Resolved) [I82.409]  Long-term (current) use of anticoagulants [Z79.01] [Z79.01]  Deep vein thrombosis (DVT) of left lower extremity  unspecified chronicity  unspecified vein (H) [I82.402]  Ulcerative colitis with complication  unspecified location (H) [K51.919]  Acute deep vein thrombosis (DVT) of right lower extremity  unspecified vein (H) (Resolved) [I82.401]             Comments:  " [employer] [facility] INR Monitoring patient. Monitor by exception. OK to leave DVM.             Anticoagulation Care Providers       Provider Role Specialty Phone number    Carter Skinner PA-C Referring Family Medicine [phone]

## 2023-11-08 ENCOUNTER — ANTICOAGULATION THERAPY VISIT (OUTPATIENT)
Dept: ANTICOAGULATION | Facility: CLINIC | Age: 53
End: 2023-11-08
Payer: COMMERCIAL

## 2023-11-08 ENCOUNTER — TELEPHONE (OUTPATIENT)
Dept: ANTICOAGULATION | Facility: CLINIC | Age: 53
End: 2023-11-08
Payer: COMMERCIAL

## 2023-11-08 DIAGNOSIS — I82.402 DEEP VEIN THROMBOSIS (DVT) OF LEFT LOWER EXTREMITY, UNSPECIFIED CHRONICITY, UNSPECIFIED VEIN (H): ICD-10-CM

## 2023-11-08 DIAGNOSIS — Z79.01 LONG TERM CURRENT USE OF ANTICOAGULANT THERAPY: Primary | ICD-10-CM

## 2023-11-08 DIAGNOSIS — K51.919 ULCERATIVE COLITIS WITH COMPLICATION, UNSPECIFIED LOCATION (H): ICD-10-CM

## 2023-11-08 LAB — INR HOME MONITORING: 1.4 (ref 2–3)

## 2023-11-08 NOTE — TELEPHONE ENCOUNTER
ANTICOAGULATION     Donato De Leon is overdue for an INR check.     Left message reminding patient to check INR with their home meter and call results to the home monitoring company as soon as possible.     Maggie Varghese RN

## 2023-11-08 NOTE — PROGRESS NOTES
ANTICOAGULATION MANAGEMENT     Donato De Leon 53 year old male is on warfarin with subtherapeutic INR result. (Goal INR 2.0-3.0)    Recent labs: (last 7 days)     11/08/23  0000   INR 1.4*       ASSESSMENT     Source(s): Chart Review and Patient/Caregiver Call     Warfarin doses taken: Warfarin taken as instructed  Diet: No new diet changes identified  Medication/supplement changes: None noted  New illness, injury, or hospitalization: No  Signs or symptoms of bleeding or clotting: No  Previous result: Subtherapeutic last done on 10/18/23  Additional findings: None       PLAN     Recommended plan for no diet, medication or health factor changes affecting INR     Dosing Instructions: Increase your warfarin dose (18% change) with next INR in 1 week       Summary  As of 11/8/2023      Full warfarin instructions:  5 mg every Mon; 10 mg all other days   Next INR check:  11/15/2023               Telephone call with Jerry who verbalizes understanding and agrees to plan    Patient to recheck with home meter    Education provided:   Goal range and lab monitoring: goal range and significance of current result and Importance of following up at instructed interval  Symptom monitoring: monitoring for clotting signs and symptoms, monitoring for stroke signs and symptoms, and when to seek medical attention/emergency care    Plan made per [facility] anticoagulation protocol    Annette Alexis RN  Anticoagulation Clinic  11/8/2023    _______________________________________________________________________     Anticoagulation Episode Summary       Current INR goal:  2.0-3.0   TTR:  59.9% (1 y)   Target end date:  Indefinite   Send INR reminders to:  ANTICOAG HOME MONITORING    Indications    Deep vein thrombosis (DVT) (H) [I82.409] (Resolved) [I82.409]  Long-term (current) use of anticoagulants [Z79.01] [Z79.01]  Deep vein thrombosis (DVT) of left lower extremity  unspecified chronicity  unspecified vein (H) [I82.402]  Ulcerative colitis with  complication  unspecified location (H) [K57.797]  Acute deep vein thrombosis (DVT) of right lower extremity  unspecified vein (H) (Resolved) [I82.401]             Comments:  [employer] [facility] INR Monitoring patient. Monitor by exception. OK to leave DVM.             Anticoagulation Care Providers       Provider Role Specialty Phone number    Carter Skinner PA-C Referring [facility] [phone]

## 2023-11-20 LAB — INR HOME MONITORING: 2.5 (ref 2–3)

## 2023-11-21 ENCOUNTER — TELEPHONE (OUTPATIENT)
Dept: FAMILY MEDICINE | Facility: CLINIC | Age: 53
End: 2023-11-21
Payer: COMMERCIAL

## 2023-11-21 ENCOUNTER — ANTICOAGULATION THERAPY VISIT (OUTPATIENT)
Dept: ANTICOAGULATION | Facility: CLINIC | Age: 53
End: 2023-11-21
Payer: COMMERCIAL

## 2023-11-21 DIAGNOSIS — Z79.01 LONG TERM CURRENT USE OF ANTICOAGULANT THERAPY: Primary | ICD-10-CM

## 2023-11-21 DIAGNOSIS — I82.402 DEEP VEIN THROMBOSIS (DVT) OF LEFT LOWER EXTREMITY, UNSPECIFIED CHRONICITY, UNSPECIFIED VEIN (H): ICD-10-CM

## 2023-11-21 DIAGNOSIS — K51.919 ULCERATIVE COLITIS WITH COMPLICATION, UNSPECIFIED LOCATION (H): ICD-10-CM

## 2023-11-21 NOTE — TELEPHONE ENCOUNTER
Incoming fax from [facility] requesting a 4 day hold of warfarin prior to colonoscopy on 12/21/23

## 2023-11-21 NOTE — PROGRESS NOTES
ANTICOAGULATION MANAGEMENT     Donato De Leon 53 year old male is on warfarin with therapeutic INR result. (Goal INR 2.0-3.0)    Recent labs: (last 7 days)     11/20/23  0000   INR 2.5       ASSESSMENT     Source(s): Chart Review and Patient/Caregiver Call     Warfarin doses taken: Warfarin taken as instructed  Diet: No new diet changes identified  Medication/supplement changes: None noted  New illness, injury, or hospitalization: No  Signs or symptoms of bleeding or clotting: No  Previous result: Subtherapeutic  Additional findings: Upcoming surgery/procedure Colonoscopy 12//21/23. Procedure plan request sent to [facility]       PLAN     Recommended plan for no diet, medication or health factor changes affecting INR     Dosing Instructions: Continue your current warfarin dose with next INR in 2 weeks       Summary  As of 11/21/2023      Full warfarin instructions:  5 mg every Mon; 10 mg all other days   Next INR check:  12/4/2023               Sent [employer] message with dosing and follow up instructions    Patient to recheck with home meter    Education provided:   Please call back if any changes to your diet, medications or how you've been taking warfarin  Resume manage by exception with home monitor. Continue to submit INR results to home monitor company.You will only be called when your result is out of range. Please call and notify [facility] if new medication started, dose missed, signs or symptoms of bleeding or clotting, or a surgery/procedure is scheduled.    Plan made per [facility] anticoagulation protocol    Eliza Donovan, RN  Anticoagulation Clinic  11/21/2023    _______________________________________________________________________     Anticoagulation Episode Summary       Current INR goal:  2.0-3.0   TTR:  58.6% (1 y)   Target end date:  Indefinite   Send INR reminders to:  [facility] HOME MONITORING    Indications    Deep vein thrombosis (DVT) (H) [I82.409] (Resolved) [I82.409]  Long-term (current) use of  anticoagulants [Z79.01] [Z79.01]  Deep vein thrombosis (DVT) of left lower extremity  unspecified chronicity  unspecified vein (H) [I82.402]  Ulcerative colitis with complication  unspecified location (H) [K51.919]  Acute deep vein thrombosis (DVT) of right lower extremity  unspecified vein (H) (Resolved) [I82.401]             Comments:  [employer] [facility] INR Monitoring patient. Monitor by exception. OK to leave DVM.             Anticoagulation Care Providers       Provider Role Specialty Phone number    Carter Skinner PA-C Referring Family Medicine [phone]

## 2023-11-26 DIAGNOSIS — I82.402 DEEP VEIN THROMBOSIS (DVT) OF LEFT LOWER EXTREMITY, UNSPECIFIED CHRONICITY, UNSPECIFIED VEIN (H): ICD-10-CM

## 2023-11-27 RX ORDER — WARFARIN SODIUM 5 MG/1
TABLET ORAL
Qty: 180 TABLET | Refills: 1 | Status: SHIPPED | OUTPATIENT
Start: 2023-11-27 | End: 2024-03-04

## 2023-11-27 NOTE — TELEPHONE ENCOUNTER
ANTICOAGULATION MANAGEMENT:  Medication Refill    Anticoagulation Summary  As of 11/21/2023      Warfarin maintenance plan:  5 mg (5 mg x 1) every Mon; 10 mg (5 mg x 2) all other days   Next INR check:  12/4/2023   Target end date:  Indefinite    Indications    Deep vein thrombosis (DVT) (H) [I82.409] (Resolved) [I82.409]  Long-term (current) use of anticoagulants [Z79.01] [Z79.01]  Deep vein thrombosis (DVT) of left lower extremity  unspecified chronicity  unspecified vein (H) [I82.402]  Ulcerative colitis with complication  unspecified location (H) [K51.919]  Acute deep vein thrombosis (DVT) of right lower extremity  unspecified vein (H) (Resolved) [I82.401]                 Anticoagulation Care Providers       Provider Role Specialty Phone number    Carter Skinner PA-C Referring Family Medicine [phone]            Refill Criteria    Visit with referring provider/group: Meets criteria: office visit within referring provider group in the last 1 year on 10/18/23    [facility] referral signed last signed: 04/25/2023; within last year: Yes    Lab monitoring not exceeding 2 weeks overdue: Yes    Donato meets all criteria for refill. Rx instructions and quantity supplied updated to match patient's current dosing plan. Warfarin 90 day supply with 1 refill granted per [facility] protocol     Eliza Donovan RN  Anticoagulation Clinic

## 2023-12-06 ENCOUNTER — TELEPHONE (OUTPATIENT)
Dept: ANTICOAGULATION | Facility: CLINIC | Age: 53
End: 2023-12-06
Payer: COMMERCIAL

## 2023-12-06 NOTE — TELEPHONE ENCOUNTER
ANTICOAGULATION     Donato EUGENE De Leon is overdue for an INR check.     Left message reminding patient to check INR with their home meter and call results to the home monitoring company as soon as possible.     Kiran Escalona RN

## 2023-12-07 LAB — INR HOME MONITORING: 2.8 (ref 2–3)

## 2023-12-07 NOTE — TELEPHONE ENCOUNTER
[facility] calling to follow up on request for warfarin hold orders for a colonoscopy scheduled for 12/21/23.    Routing to [facility] for review.    MICHELE BloodN, RN  Anticoagulation Clinic

## 2023-12-08 ENCOUNTER — DOCUMENTATION ONLY (OUTPATIENT)
Dept: ANTICOAGULATION | Facility: CLINIC | Age: 53
End: 2023-12-08
Payer: COMMERCIAL

## 2023-12-08 DIAGNOSIS — I82.402 DEEP VEIN THROMBOSIS (DVT) OF LEFT LOWER EXTREMITY, UNSPECIFIED CHRONICITY, UNSPECIFIED VEIN (H): ICD-10-CM

## 2023-12-08 DIAGNOSIS — Z79.01 LONG TERM CURRENT USE OF ANTICOAGULANT THERAPY: Primary | ICD-10-CM

## 2023-12-08 DIAGNOSIS — K51.919 ULCERATIVE COLITIS WITH COMPLICATION, UNSPECIFIED LOCATION (H): ICD-10-CM

## 2023-12-08 NOTE — PROGRESS NOTES
ANTICOAGULATION  MANAGEMENT-Home Monitor Managed by Exception    Donato EUGENE De Leon 53 year old male is on warfarin with therapeutic INR result. (Goal INR 2.0-3.0)    Recent labs: (last 7 days)     12/07/23  0000   INR 2.8       Previous INR was Therapeutic  Medication, diet, health changes since last INR:chart reviewed; none identified  Contacted within the last 12 weeks by phone on 11/21/23  Last [facility] referral date: 04/25/2023      JULEE Sewell was NOT contacted regarding therapeutic result today per home monitoring policy manage by exception agreement.   Current warfarin dose is to be continued:     Summary  As of 12/8/2023      Full warfarin instructions:  5 mg every Mon; 10 mg all other days   Next INR check:  12/21/2023             ?   Eliza Donovan RN  Anticoagulation Clinic  12/8/2023    _______________________________________________________________________     Anticoagulation Episode Summary       Current INR goal:  2.0-3.0   TTR:  62.3% (1 y)   Target end date:  Indefinite   Send INR reminders to:  ANTICOAG HOME MONITORING    Indications    Deep vein thrombosis (DVT) (H) [I82.409] (Resolved) [I82.409]  Long-term (current) use of anticoagulants [Z79.01] [Z79.01]  Deep vein thrombosis (DVT) of left lower extremity  unspecified chronicity  unspecified vein (H) [I82.402]  Ulcerative colitis with complication  unspecified location (H) [K51.919]  Acute deep vein thrombosis (DVT) of right lower extremity  unspecified vein (H) (Resolved) [I82.401]             Comments:  [employer] [facility] INR Monitoring patient. Monitor by exception. OK to leave DVM.             Anticoagulation Care Providers       Provider Role Specialty Phone number    Carter Skinner PA-C Referring Family Medicine [phone]

## 2023-12-11 NOTE — TELEPHONE ENCOUNTER
AMNA-PROCEDURAL ANTICOAGULATION  MANAGEMENT    ASSESSMENT     Warfarin interruption plan for colonoscopy on 2023.    Indication for Anticoagulation: DVT    DVT 2017, provoked with UC flare   Successfully tolerated hold, no bridge 2021, 2022 for colonoscopy    Amna-Procedure Risk stratification for thromboembolism: low ( Chest guidelines)    VTE:  CHEST Perioperative Management guidelines suggest against bridging for patients with Hx of VTE as sole clinical indication for warfarin except in high risk stratification patients.    RECOMMENDATION     Pre-Procedure:  Hold warfarin for 4 days, until after procedure startin2023   No Bridge    Post-Procedure:  Resume warfarin dose if okay with provider doing procedure on night of procedure, 2023 PM: 15mg  Recheck INR ~ 7 days after resuming warfarin     Plan routed to referring provider for approval  ?   Stormy Steward [facility]    SUBJECTIVE/OBJECTIVE     Donato EUGENE De Leon, a 53 year old male    Goal INR Range: 2.0-3.0     Patient bridged in past: Yes: 2020 prior to guidelines updates    Wt Readings from Last 3 Encounters:   10/18/23 122.5 kg (270 lb)   22 117.9 kg (260 lb)   22 117.9 kg (260 lb)      Ideal body weight: 77.6 kg (171 lb 1.2 oz)  Adjusted ideal body weight: 95.5 kg (210 lb 10.3 oz)     Estimated body mass index is 36.62 kg/m  as calculated from the following:    Height as of 10/18/23: 1.829 m (6').    Weight as of 10/18/23: 122.5 kg (270 lb).    Lab Results   Component Value Date    INR 2.8 2023    INR 2.5 2023    INR 1.4 (L) 2023     Lab Results   Component Value Date    HGB 16.1 2022    HCT 46.8 2022     2022     Lab Results   Component Value Date    CR 1.31 (H) 10/18/2023    CR 1.23 2022    CR 1.39 (H) 2021     Estimated Creatinine Clearance: 88.2 mL/min (A) (based on SCr of 1.31 mg/dL (H)).

## 2023-12-13 NOTE — TELEPHONE ENCOUNTER
Procedure plan reviewed with patient. Declined my chart message with plan.  Anticoagulation calendar updated with HOLD dates and INR recheck date.     [facility] updated on procedure plan.

## 2023-12-21 ENCOUNTER — TRANSFERRED RECORDS (OUTPATIENT)
Dept: HEALTH INFORMATION MANAGEMENT | Facility: CLINIC | Age: 53
End: 2023-12-21
Payer: COMMERCIAL

## 2024-01-07 LAB — INR HOME MONITORING: 1.1 (ref 2–3)

## 2024-01-08 ENCOUNTER — ANTICOAGULATION THERAPY VISIT (OUTPATIENT)
Dept: ANTICOAGULATION | Facility: CLINIC | Age: 54
End: 2024-01-08
Payer: COMMERCIAL

## 2024-01-08 DIAGNOSIS — Z79.01 LONG TERM CURRENT USE OF ANTICOAGULANT THERAPY: Primary | ICD-10-CM

## 2024-01-08 DIAGNOSIS — I82.402 DEEP VEIN THROMBOSIS (DVT) OF LEFT LOWER EXTREMITY, UNSPECIFIED CHRONICITY, UNSPECIFIED VEIN (H): ICD-10-CM

## 2024-01-08 DIAGNOSIS — K51.919 ULCERATIVE COLITIS WITH COMPLICATION, UNSPECIFIED LOCATION (H): ICD-10-CM

## 2024-01-08 NOTE — PROGRESS NOTES
ANTICOAGULATION MANAGEMENT     Donato De Leon 53 year old male is on warfarin with subtherapeutic INR result. (Goal INR 2.0-3.0)    Recent labs: (last 7 days)     01/06/24  0000   INR 1.1*       ASSESSMENT     Source(s): Chart Review and Patient/Caregiver Call     Warfarin doses taken: Less warfarin taken than planned which may be affecting INR, patient has been taking 10 mg on Monday and 5 mg all other days of the week. Per patient he has been taking this dosing long term.  Diet: No new diet changes identified  Medication/supplement changes: None noted  New illness, injury, or hospitalization: No  Signs or symptoms of bleeding or clotting: No  Previous result: Therapeutic last 2(+) visits  Additional findings: None       PLAN     Recommended plan for no diet, medication or health factor changes affecting INR. Dosing patient has taking less then noted.     Dosing Instructions:  decrease by 15.4% of what [facility] has noted as maintenance, increase of 50% from what patient notes he has been taking  with next INR in 2 weeks       Summary  As of 1/8/2024      Full warfarin instructions:  1/8: 15 mg; Otherwise 5 mg every Mon, Wed, Fri; 10 mg all other days   Next INR check:  1/19/2024               Telephone call with Jerry gave answers to assessment questions.  Sent [employer] message with dosing and follow up instructions    Patient to recheck with home meter    Education provided:   None required    Plan made with [facility] Pharmacist Stormy Molina, RN  Anticoagulation Clinic  1/8/2024    _______________________________________________________________________     Anticoagulation Episode Summary       Current INR goal:  2.0-3.0   TTR:  57.9% (1 y)   Target end date:  Indefinite   Send INR reminders to:  ANTICOAG HOME MONITORING    Indications    Deep vein thrombosis (DVT) (H) [I82.409] (Resolved) [I82.409]  Long-term (current) use of anticoagulants [Z79.01] [Z79.01]  Deep vein thrombosis (DVT) of left lower  extremity  unspecified chronicity  unspecified vein (H) [I82.402]  Ulcerative colitis with complication  unspecified location (H) [K51.919]  Acute deep vein thrombosis (DVT) of right lower extremity  unspecified vein (H) (Resolved) [I82.401]             Comments:  [facility] INR Monitoring patient. Monitor by exception. OK to leave DVM.             Anticoagulation Care Providers       Provider Role Specialty Phone number    Carter Skinner PA-C Referring Family Medicine [phone]

## 2024-01-18 ENCOUNTER — TRANSFERRED RECORDS (OUTPATIENT)
Dept: HEALTH INFORMATION MANAGEMENT | Facility: CLINIC | Age: 54
End: 2024-01-18
Payer: COMMERCIAL

## 2024-01-25 ENCOUNTER — TRANSFERRED RECORDS (OUTPATIENT)
Dept: HEALTH INFORMATION MANAGEMENT | Facility: CLINIC | Age: 54
End: 2024-01-25
Payer: COMMERCIAL

## 2024-01-25 LAB
ALT SERPL-CCNC: 22 IU/L (ref 0–44)
AST SERPL-CCNC: 18 IU/L (ref 0–40)

## 2024-01-30 ENCOUNTER — TRANSFERRED RECORDS (OUTPATIENT)
Dept: HEALTH INFORMATION MANAGEMENT | Facility: CLINIC | Age: 54
End: 2024-01-30
Payer: COMMERCIAL

## 2024-02-01 ENCOUNTER — MYC MEDICAL ADVICE (OUTPATIENT)
Dept: ANTICOAGULATION | Facility: CLINIC | Age: 54
End: 2024-02-01
Payer: COMMERCIAL

## 2024-02-08 ENCOUNTER — TELEPHONE (OUTPATIENT)
Dept: ANTICOAGULATION | Facility: CLINIC | Age: 54
End: 2024-02-08
Payer: COMMERCIAL

## 2024-02-09 ENCOUNTER — ANTICOAGULATION THERAPY VISIT (OUTPATIENT)
Dept: ANTICOAGULATION | Facility: CLINIC | Age: 54
End: 2024-02-09
Payer: COMMERCIAL

## 2024-02-09 DIAGNOSIS — Z79.01 LONG TERM CURRENT USE OF ANTICOAGULANT THERAPY: Primary | ICD-10-CM

## 2024-02-09 DIAGNOSIS — I82.402 DEEP VEIN THROMBOSIS (DVT) OF LEFT LOWER EXTREMITY, UNSPECIFIED CHRONICITY, UNSPECIFIED VEIN (H): ICD-10-CM

## 2024-02-09 DIAGNOSIS — K51.919 ULCERATIVE COLITIS WITH COMPLICATION, UNSPECIFIED LOCATION (H): ICD-10-CM

## 2024-02-09 LAB — INR HOME MONITORING: 2 (ref 2–3)

## 2024-02-09 NOTE — PROGRESS NOTES
ANTICOAGULATION MANAGEMENT     Donato De Leon 53 year old male is on warfarin with therapeutic INR result. (Goal INR 2.0-3.0)    Recent labs: (last 7 days)     02/09/24  0000   INR 2.0       ASSESSMENT     Source(s): Chart Review  Previous INR was Subtherapeutic  Medication, diet, health changes since last INR chart reviewed; none identified         PLAN     Recommended plan for no diet, medication or health factor changes affecting INR     Dosing Instructions: Continue your current warfarin dose with next INR in 2 weeks       Summary  As of 2/9/2024      Full warfarin instructions:  5 mg every Mon, Wed, Fri; 10 mg all other days   Next INR check:  2/23/2024               Detailed voice message left for Jerry with dosing instructions and follow up date.   Sent [employer] message with dosing and follow up instructions    Patient to recheck with home meter    Education provided:   Please call back if any changes to your diet, medications or how you've been taking warfarin  Contact [phone]  with any changes, questions or concerns.     Plan made per [facility] anticoagulation protocol    Anya Childers RN  Anticoagulation Clinic  2/9/2024    _______________________________________________________________________     Anticoagulation Episode Summary       Current INR goal:  2.0-3.0   TTR:  48.8% (1 y)   Target end date:  Indefinite   Send INR reminders to:  ANTICOAG HOME MONITORING    Indications    Deep vein thrombosis (DVT) (H) [I82.409] (Resolved) [I82.409]  Long-term (current) use of anticoagulants [Z79.01] [Z79.01]  Deep vein thrombosis (DVT) of left lower extremity  unspecified chronicity  unspecified vein (H) [I82.402]  Ulcerative colitis with complication  unspecified location (H) [K51.919]  Acute deep vein thrombosis (DVT) of right lower extremity  unspecified vein (H) (Resolved) [I82.401]             Comments:  [facility] INR Monitoring patient. Monitor by exception. OK to leave DVM.             Anticoagulation Care  Providers       Provider Role Specialty Phone number    Carter Skinner PA-C Referring Family Medicine [phone]

## 2024-03-02 DIAGNOSIS — I82.402 DEEP VEIN THROMBOSIS (DVT) OF LEFT LOWER EXTREMITY, UNSPECIFIED CHRONICITY, UNSPECIFIED VEIN (H): ICD-10-CM

## 2024-03-04 RX ORDER — WARFARIN SODIUM 5 MG/1
TABLET ORAL
Qty: 190 TABLET | Refills: 1 | Status: SHIPPED | OUTPATIENT
Start: 2024-03-04

## 2024-03-04 NOTE — TELEPHONE ENCOUNTER
ANTICOAGULATION MANAGEMENT:  Medication Refill    Anticoagulation Summary  As of 2/9/2024      Warfarin maintenance plan:  5 mg (5 mg x 1) every Mon, Wed, Fri; 10 mg (5 mg x 2) all other days   Next INR check:  2/23/2024   Target end date:  Indefinite    Indications    Deep vein thrombosis (DVT) (H) [I82.409] (Resolved) [I82.409]  Long-term (current) use of anticoagulants [Z79.01] [Z79.01]  Deep vein thrombosis (DVT) of left lower extremity  unspecified chronicity  unspecified vein (H) [I82.402]  Ulcerative colitis with complication  unspecified location (H) [K51.919]  Acute deep vein thrombosis (DVT) of right lower extremity  unspecified vein (H) (Resolved) [I82.401]                 Anticoagulation Care Providers       Provider Role Specialty Phone number    Carter Skinner PA-C Referring Family Medicine [phone]            Refill Criteria    Visit with referring provider/group: Meets criteria: office visit within referring provider group in the last 1 year on 10/18/23    [facility] referral last signed: 04/25/2023; within last year: Yes    Lab monitoring not exceeding 2 weeks overdue: Yes    Donato meets all criteria for refill. Rx instructions and quantity supplied updated to match patient's current dosing plan. Warfarin 90 day supply with 1 refill granted per [facility] protocol     Eliza Donovan, RN  Anticoagulation Clinic

## 2024-03-08 ENCOUNTER — MYC MEDICAL ADVICE (OUTPATIENT)
Dept: ANTICOAGULATION | Facility: CLINIC | Age: 54
End: 2024-03-08
Payer: COMMERCIAL

## 2024-03-08 NOTE — TELEPHONE ENCOUNTER
ANTICOAGULATION     Donato De Leon is overdue for an INR check.     [employer] message sent     Annette Preciado RN

## 2024-03-18 ENCOUNTER — ANTICOAGULATION THERAPY VISIT (OUTPATIENT)
Dept: ANTICOAGULATION | Facility: CLINIC | Age: 54
End: 2024-03-18
Payer: COMMERCIAL

## 2024-03-18 ENCOUNTER — TELEPHONE (OUTPATIENT)
Dept: ANTICOAGULATION | Facility: CLINIC | Age: 54
End: 2024-03-18
Payer: COMMERCIAL

## 2024-03-18 DIAGNOSIS — K51.919 ULCERATIVE COLITIS WITH COMPLICATION, UNSPECIFIED LOCATION (H): ICD-10-CM

## 2024-03-18 DIAGNOSIS — I82.402 DEEP VEIN THROMBOSIS (DVT) OF LEFT LOWER EXTREMITY, UNSPECIFIED CHRONICITY, UNSPECIFIED VEIN (H): ICD-10-CM

## 2024-03-18 DIAGNOSIS — Z79.01 LONG TERM CURRENT USE OF ANTICOAGULANT THERAPY: Primary | ICD-10-CM

## 2024-03-18 LAB — INR HOME MONITORING: 2.5 (ref 2–3)

## 2024-03-18 NOTE — PROGRESS NOTES
ANTICOAGULATION MANAGEMENT     Donato De Leon 53 year old male is on warfarin with therapeutic INR result. (Goal INR 2.0-3.0)    Recent labs: (last 7 days)     03/18/24  0000   INR 2.5       ASSESSMENT     Source(s): Chart Review  Previous INR was Therapeutic last visit; previously outside of goal range  Medication, diet, health changes since last INR chart reviewed; none identified         PLAN     Recommended plan for no diet, medication or health factor changes affecting INR     Dosing Instructions: Continue your current warfarin dose with next INR in 2 weeks       Summary  As of 3/18/2024      Full warfarin instructions:  5 mg every Mon, Wed, Fri; 10 mg all other days   Next INR check:  4/1/2024               Detailed voice message left for Jerry with dosing instructions and follow up date.     Patient to recheck with home meter    Education provided:   Please call back if any changes to your diet, medications or how you've been taking warfarin  Resume manage by exception with home monitor. Continue to submit INR results to home monitor company.You will only be called when your result is out of range. Please call and notify [facility] if new medication started, dose missed, signs or symptoms of bleeding or clotting, or a surgery/procedure is scheduled. Due for next call no later than: 6/16/24.  Contact [phone]  with any changes, questions or concerns.     Plan made per [facility] anticoagulation protocol    Annette Alexis RN  Anticoagulation Clinic  3/18/2024    _______________________________________________________________________     Anticoagulation Episode Summary       Current INR goal:  2.0-3.0   TTR:  50.0% (1 y)   Target end date:  Indefinite   Send INR reminders to:  [facility] HOME MONITORING    Indications    Deep vein thrombosis (DVT) (H) [I82.409] (Resolved) [I82.409]  Long-term (current) use of anticoagulants [Z79.01] [Z79.01]  Deep vein thrombosis (DVT) of left lower extremity  unspecified  chronicity  unspecified vein (H) [I82.402]  Ulcerative colitis with complication  unspecified location (H) [K51.919]  Acute deep vein thrombosis (DVT) of right lower extremity  unspecified vein (H) (Resolved) [I82.401]             Comments:  [facility] INR Monitoring patient. Monitor by exception. OK to leave DVM.             Anticoagulation Care Providers       Provider Role Specialty Phone number    Carter Skinner PA-C Referring Family Medicine [phone]

## 2024-04-08 ENCOUNTER — MYC MEDICAL ADVICE (OUTPATIENT)
Dept: ANTICOAGULATION | Facility: CLINIC | Age: 54
End: 2024-04-08
Payer: COMMERCIAL

## 2024-04-08 NOTE — TELEPHONE ENCOUNTER
ANTICOAGULATION     Donato De Leon is overdue for an INR check.     [employer] message sent     Annette Preciado RN

## 2024-04-15 ENCOUNTER — TELEPHONE (OUTPATIENT)
Dept: ANTICOAGULATION | Facility: CLINIC | Age: 54
End: 2024-04-15
Payer: COMMERCIAL

## 2024-04-15 ENCOUNTER — DOCUMENTATION ONLY (OUTPATIENT)
Dept: ANTICOAGULATION | Facility: CLINIC | Age: 54
End: 2024-04-15
Payer: COMMERCIAL

## 2024-04-15 DIAGNOSIS — I82.402 DEEP VEIN THROMBOSIS (DVT) OF LEFT LOWER EXTREMITY, UNSPECIFIED CHRONICITY, UNSPECIFIED VEIN (H): ICD-10-CM

## 2024-04-15 DIAGNOSIS — K51.919 ULCERATIVE COLITIS WITH COMPLICATION, UNSPECIFIED LOCATION (H): ICD-10-CM

## 2024-04-15 DIAGNOSIS — Z79.01 LONG TERM CURRENT USE OF ANTICOAGULANT THERAPY: Primary | ICD-10-CM

## 2024-04-15 LAB — INR HOME MONITORING: 3 (ref 2–3)

## 2024-04-15 NOTE — PROGRESS NOTES
ANTICOAGULATION  MANAGEMENT-Home Monitor Managed by Exception    Donato EUGENE De Leon 54 year old male is on warfarin with therapeutic INR result. (Goal INR 2.0-3.0)    Recent labs: (last 7 days)     04/15/24  0000   INR 3.0       Previous INR was Therapeutic  Medication, diet, health changes since last INR:chart reviewed; none identified  Contacted within the last 12 weeks by phone on 4/1/24  Last [facility] referral date: 04/25/2023      JULEE Sewell was NOT contacted regarding therapeutic result today per home monitoring policy manage by exception agreement.   Current warfarin dose is to be continued:     Summary  As of 4/15/2024      Full warfarin instructions:  5 mg every Mon, Wed, Fri; 10 mg all other days   Next INR check:  4/29/2024             ?   Dotty Howell RN  Anticoagulation Clinic  4/15/2024    _______________________________________________________________________     Anticoagulation Episode Summary       Current INR goal:  2.0-3.0   TTR:  53.6% (1 y)   Target end date:  Indefinite   Send INR reminders to:  ANTICOAG HOME MONITORING    Indications    Deep vein thrombosis (DVT) (H) [I82.409] (Resolved) [I82.409]  Long-term (current) use of anticoagulants [Z79.01] [Z79.01]  Deep vein thrombosis (DVT) of left lower extremity  unspecified chronicity  unspecified vein (H) [I82.402]  Ulcerative colitis with complication  unspecified location (H) [K51.919]  Acute deep vein thrombosis (DVT) of right lower extremity  unspecified vein (H) (Resolved) [I82.401]             Comments:  [facility] INR Monitoring patient. Monitor by exception. OK to leave DVM.             Anticoagulation Care Providers       Provider Role Specialty Phone number    Carter Skinner PA-C Referring Family Medicine [phone]

## 2024-04-15 NOTE — TELEPHONE ENCOUNTER
ANTICOAGULATION     Donato De Leon is overdue for an INR check.     Left message reminding patient to check INR with their home meter and call results to the home monitoring company as soon as possible.     Annette Alexis RN

## 2024-04-17 ENCOUNTER — DOCUMENTATION ONLY (OUTPATIENT)
Dept: ANTICOAGULATION | Facility: CLINIC | Age: 54
End: 2024-04-17
Payer: COMMERCIAL

## 2024-04-17 DIAGNOSIS — Z79.01 LONG TERM CURRENT USE OF ANTICOAGULANT THERAPY: Primary | ICD-10-CM

## 2024-04-17 DIAGNOSIS — I82.402 DEEP VEIN THROMBOSIS (DVT) OF LEFT LOWER EXTREMITY, UNSPECIFIED CHRONICITY, UNSPECIFIED VEIN (H): ICD-10-CM

## 2024-04-17 NOTE — PROGRESS NOTES
ANTICOAGULATION CLINIC REFERRAL RENEWAL REQUEST       An annual renewal order is required for all patients referred to [facility] Anticoagulation [facility].?  Please review and sign the pended referral order for Donato De Leon.       ANTICOAGULATION SUMMARY      Warfarin indication(s)   DVT    Mechanical heart valve present?  NO       Current goal range   INR: 2.0-3.0     Goal appropriate for indication? Goal INR 2-3, standard for indication(s) above     Time in Therapeutic Range (TTR)  (Goal > 60%) 50%       Office visit with referring provider's group within last year yes on 10/18/2023       Annette Alexis RN  [facility] Anticoagulation Clinic

## 2024-05-05 LAB — INR HOME MONITORING: 2.3 (ref 2–3)

## 2024-05-06 ENCOUNTER — DOCUMENTATION ONLY (OUTPATIENT)
Dept: ANTICOAGULATION | Facility: CLINIC | Age: 54
End: 2024-05-06
Payer: COMMERCIAL

## 2024-05-06 DIAGNOSIS — I82.402 DEEP VEIN THROMBOSIS (DVT) OF LEFT LOWER EXTREMITY, UNSPECIFIED CHRONICITY, UNSPECIFIED VEIN (H): ICD-10-CM

## 2024-05-06 DIAGNOSIS — K51.919 ULCERATIVE COLITIS WITH COMPLICATION, UNSPECIFIED LOCATION (H): ICD-10-CM

## 2024-05-06 DIAGNOSIS — Z79.01 LONG TERM CURRENT USE OF ANTICOAGULANT THERAPY: Primary | ICD-10-CM

## 2024-05-06 NOTE — PROGRESS NOTES
ANTICOAGULATION  MANAGEMENT-Home Monitor Managed by Exception    Donato EUGENE De Leon 54 year old male is on warfarin with therapeutic INR result. (Goal INR 2.0-3.0)    Recent labs: (last 7 days)     05/05/24  0000   INR 2.3       Previous INR was Therapeutic  Medication, diet, health changes since last INR:chart reviewed; none identified  Contacted within the last 12 weeks by phone on 3/18/24  Last [facility] referral date: 04/18/2024      JULEE Sewell was NOT contacted regarding therapeutic result today per home monitoring policy manage by exception agreement.   Current warfarin dose is to be continued:     Summary  As of 5/6/2024      Full warfarin instructions:  5 mg every Mon, Wed, Fri; 10 mg all other days   Next INR check:  5/20/2024             ?   Ghada Boateng RN  Anticoagulation Clinic  5/6/2024    _______________________________________________________________________     Anticoagulation Episode Summary       Current INR goal:  2.0-3.0   TTR:  57.0% (1 y)   Target end date:  Indefinite   Send INR reminders to:  ANTICOAG HOME MONITORING    Indications    Deep vein thrombosis (DVT) (H) [I82.409] (Resolved) [I82.409]  Long-term (current) use of anticoagulants [Z79.01] [Z79.01]  Deep vein thrombosis (DVT) of left lower extremity  unspecified chronicity  unspecified vein (H) [I82.402]  Ulcerative colitis with complication  unspecified location (H) [K51.919]  Acute deep vein thrombosis (DVT) of right lower extremity  unspecified vein (H) (Resolved) [I82.401]             Comments:  [facility] INR Monitoring patient. Monitor by exception. OK to leave DVM.             Anticoagulation Care Providers       Provider Role Specialty Phone number    Carter Skinner PA-C Referring Family Medicine [phone]

## 2024-05-21 ENCOUNTER — DOCUMENTATION ONLY (OUTPATIENT)
Dept: ANTICOAGULATION | Facility: CLINIC | Age: 54
End: 2024-05-21
Payer: COMMERCIAL

## 2024-05-21 DIAGNOSIS — I82.402 DEEP VEIN THROMBOSIS (DVT) OF LEFT LOWER EXTREMITY, UNSPECIFIED CHRONICITY, UNSPECIFIED VEIN (H): ICD-10-CM

## 2024-05-21 DIAGNOSIS — K51.919 ULCERATIVE COLITIS WITH COMPLICATION, UNSPECIFIED LOCATION (H): ICD-10-CM

## 2024-05-21 DIAGNOSIS — Z79.01 LONG TERM CURRENT USE OF ANTICOAGULANT THERAPY: Primary | ICD-10-CM

## 2024-05-21 LAB — INR HOME MONITORING: 2 (ref 2–3)

## 2024-05-21 NOTE — PROGRESS NOTES
ANTICOAGULATION  MANAGEMENT-Home Monitor Managed by Exception    Donato EUGENE De Leon 54 year old male is on warfarin with therapeutic INR result. (Goal INR 2.0-3.0)    Recent labs: (last 7 days)     05/21/24  0000   INR 2.0       Previous INR was Therapeutic  Medication, diet, health changes since last INR:chart reviewed; none identified  Contacted within the last 12 weeks by phone on 3/18/24  Last [facility] referral date: 04/18/2024      JULEE     Donato was NOT contacted regarding therapeutic result today per home monitoring policy manage by exception agreement.   Current warfarin dose is to be continued:       ?   Anya Childers RN  Anticoagulation Clinic  5/21/2024    _______________________________________________________________________     Anticoagulation Episode Summary       Current INR goal:  2.0-3.0   TTR:  58.5% (1 y)   Target end date:  Indefinite   Send INR reminders to:  ANTICOAG HOME MONITORING    Indications    Deep vein thrombosis (DVT) (H) [I82.409] (Resolved) [I82.409]  Long-term (current) use of anticoagulants [Z79.01] [Z79.01]  Deep vein thrombosis (DVT) of left lower extremity  unspecified chronicity  unspecified vein (H) [I82.402]  Ulcerative colitis with complication  unspecified location (H) [K51.919]  Acute deep vein thrombosis (DVT) of right lower extremity  unspecified vein (H) (Resolved) [I82.401]             Comments:  [facility] INR Monitoring patient. Monitor by exception. OK to leave DVM.             Anticoagulation Care Providers       Provider Role Specialty Phone number    Brody, Carter Romo PA-C Referring Family Medicine [phone]

## 2024-06-11 ENCOUNTER — ANTICOAGULATION THERAPY VISIT (OUTPATIENT)
Dept: ANTICOAGULATION | Facility: CLINIC | Age: 54
End: 2024-06-11
Payer: COMMERCIAL

## 2024-06-11 DIAGNOSIS — K51.919 ULCERATIVE COLITIS WITH COMPLICATION, UNSPECIFIED LOCATION (H): ICD-10-CM

## 2024-06-11 DIAGNOSIS — I82.402 DEEP VEIN THROMBOSIS (DVT) OF LEFT LOWER EXTREMITY, UNSPECIFIED CHRONICITY, UNSPECIFIED VEIN (H): ICD-10-CM

## 2024-06-11 DIAGNOSIS — Z79.01 LONG TERM CURRENT USE OF ANTICOAGULANT THERAPY: Primary | ICD-10-CM

## 2024-06-11 LAB — INR HOME MONITORING: 3.5 (ref 2–3)

## 2024-06-11 NOTE — PROGRESS NOTES
ANTICOAGULATION MANAGEMENT     Donato De Leon 54 year old male is on warfarin with supratherapeutic INR result. (Goal INR 2.0-3.0)    Recent labs: (last 7 days)     06/11/24  0000   INR 3.5*       ASSESSMENT     Source(s): Chart Review and Patient/Caregiver Call     Warfarin doses taken: More warfarin taken than planned which may be affecting INR  Diet: No new diet changes identified  Medication/supplement changes: None noted  New illness, injury, or hospitalization: No  Signs or symptoms of bleeding or clotting: No  Previous result: Therapeutic last 2(+) visits  Additional findings: Shared clinical decision making today, pt reports that he has been taking 5mg Mondays and 10mg all other days of the week and has been taking this dosing for the past several month. He is ok with changing maintenance dose of warfarin since INR elevated today but has already taken warfarin today and prefers to take 5mg on Monday/Friday and 10mg all other days, will recheck in 2 weeks.        PLAN     Recommended plan for no diet, medication or health factor changes affecting INR     Dosing Instructions: decrease your warfarin dose (7.7% change) with next INR in 2 weeks       Summary  As of 6/11/2024      Full warfarin instructions:  5 mg every Mon, Fri; 10 mg all other days   Next INR check:  6/25/2024               Telephone call with Jerry who verbalizes understanding and agrees to plan    Patient to recheck with home meter    Education provided:   Taking warfarin: Importance of taking warfarin as instructed  Goal range and lab monitoring: goal range and significance of current result, Importance of therapeutic range, and Importance of following up at instructed interval    Plan made per [facility] anticoagulation protocol    Barrera Jeter RN  Anticoagulation Clinic  6/11/2024    _______________________________________________________________________     Anticoagulation Episode Summary       Current INR goal:  2.0-3.0   TTR:  60.9% (1 y)    Target end date:  Indefinite   Send INR reminders to:  ANTICOAG HOME MONITORING    Indications    Deep vein thrombosis (DVT) (H) [I82.409] (Resolved) [I82.409]  Long-term (current) use of anticoagulants [Z79.01] [Z79.01]  Deep vein thrombosis (DVT) of left lower extremity  unspecified chronicity  unspecified vein (H) [I82.402]  Ulcerative colitis with complication  unspecified location (H) [K51.919]  Acute deep vein thrombosis (DVT) of right lower extremity  unspecified vein (H) (Resolved) [I82.401]             Comments:  [facility] INR Monitoring patient. Monitor by exception. OK to leave DVM.             Anticoagulation Care Providers       Provider Role Specialty Phone number    Carter Skinner PA-C Referring Family [facility] [phone]

## 2024-07-02 ENCOUNTER — MYC MEDICAL ADVICE (OUTPATIENT)
Dept: ANTICOAGULATION | Facility: CLINIC | Age: 54
End: 2024-07-02
Payer: COMMERCIAL

## 2024-07-02 NOTE — TELEPHONE ENCOUNTER
ANTICOAGULATION     Donato De Leon is overdue for an INR check.     [employer] message sent     Annette Preciado RN

## 2024-07-12 ENCOUNTER — MYC MEDICAL ADVICE (OUTPATIENT)
Dept: ANTICOAGULATION | Facility: CLINIC | Age: 54
End: 2024-07-12
Payer: COMMERCIAL

## 2024-07-19 ENCOUNTER — DOCUMENTATION ONLY (OUTPATIENT)
Dept: ANTICOAGULATION | Facility: CLINIC | Age: 54
End: 2024-07-19
Payer: COMMERCIAL

## 2024-07-19 NOTE — PROGRESS NOTES
ANTICOAGULATION     Donato EUGENE Kevinkirk is overdue for an INR check.     Reminder letter sent    Annette Preciado RN

## 2024-07-19 NOTE — LETTER
[facility] ANTICOAGULATION CLINIC  [address]  [facility] [address]  Phone: [phone]  Fax: [phone]   July 19, 2024        Donato De Leon  [address] [place] [address]            Dear Donato,    You are currently under the care of [facility] for your warfarin (Coumadin , Jantoven ) therapy.  We are contacting you because our records show you were due for an INR on 6/25/24.    There are potentially serious risks when taking warfarin without careful monitoring and we want to make sure you are safely managed.  Routine lab monitoring is required for warfarin refills.     Please check INR with your home meter and report results to the home monitoring company as soon as possible. Please call us at [phone] if you are unable to test with your home meter currently. If there has been a change in your care or other concerns, please let us know so we can help and/or update our records.         Sincerely,       [facility] Anticoagulation [facility]

## 2024-07-22 LAB — INR HOME MONITORING: 1.6 (ref 2–3)

## 2024-07-23 ENCOUNTER — ANTICOAGULATION THERAPY VISIT (OUTPATIENT)
Dept: ANTICOAGULATION | Facility: CLINIC | Age: 54
End: 2024-07-23
Payer: COMMERCIAL

## 2024-07-23 ENCOUNTER — TELEPHONE (OUTPATIENT)
Dept: FAMILY MEDICINE | Facility: CLINIC | Age: 54
End: 2024-07-23
Payer: COMMERCIAL

## 2024-07-23 DIAGNOSIS — Z79.01 LONG TERM CURRENT USE OF ANTICOAGULANT THERAPY: Primary | ICD-10-CM

## 2024-07-23 DIAGNOSIS — I82.402 DEEP VEIN THROMBOSIS (DVT) OF LEFT LOWER EXTREMITY, UNSPECIFIED CHRONICITY, UNSPECIFIED VEIN (H): ICD-10-CM

## 2024-07-23 DIAGNOSIS — K51.919 ULCERATIVE COLITIS WITH COMPLICATION, UNSPECIFIED LOCATION (H): ICD-10-CM

## 2024-07-23 NOTE — PROGRESS NOTES
ANTICOAGULATION MANAGEMENT     Donato De Leon 54 year old male is on warfarin with subtherapeutic INR result. (Goal INR 2.0-3.0)    Recent labs: (last 7 days)     07/22/24  0000   INR 1.6*       ASSESSMENT     Source(s): Chart Review and Patient/Caregiver Call     Warfarin doses taken: Warfarin taken as instructed  Diet: Protein supplement/shake had a couple shakes last week (didn't feel like cooking) which maybe affecting INR  Medication/supplement changes: None noted  New illness, injury, or hospitalization: No  Signs or symptoms of bleeding or clotting: No  Previous result: Supratherapeutic  Additional findings:  patient will be traveling the first week of August, recommended to check INR before leaving on a plane       PLAN     Recommended plan for temporary change(s) affecting INR     Dosing Instructions: booster dose then continue your current warfarin dose with next INR in 10 days       Summary  As of 7/23/2024      Full warfarin instructions:  7/23: 12.5 mg; Otherwise 5 mg every Mon, Fri; 10 mg all other days   Next INR check:  8/2/2024               Telephone call with Jerry who verbalizes understanding and agrees to plan    Patient to recheck with home meter    Education provided: Dietary considerations: Impact of protein intake on INR     Plan made per [facility] anticoagulation protocol    Maggie Varghese, RN  Anticoagulation Clinic  7/23/2024    _______________________________________________________________________     Anticoagulation Episode Summary       Current INR goal:  2.0-3.0   TTR:  64.7% (1 y)   Target end date:  Indefinite   Send INR reminders to:  [facility]IT[facility]    Indications    Deep vein thrombosis (DVT) (H) [I82.409] (Resolved) [I82.409]  Long-term (current) use of anticoagulants [Z79.01] [Z79.01]  Deep vein thrombosis (DVT) of left lower extremity  unspecified chronicity  unspecified vein (H) [I82.402]  Ulcerative colitis with complication  unspecified location (H) [K51.369]  Acute  deep vein thrombosis (DVT) of right lower extremity  unspecified vein (H) (Resolved) [I82.401]             Comments:  [facility] INR Monitoring patient. Monitor by exception. OK to leave DVM.             Anticoagulation Care Providers       Provider Role Specialty Phone number    Carter Skinner PA-C Referring Family Medicine [phone]

## 2024-07-23 NOTE — TELEPHONE ENCOUNTER
Reason for Call:  INR follow up    Detailed comments: Patient is returning call to Jaycee, please call again    Phone Number Patient can be reached at: Home number on file [phone] (home)    Best Time: any    Can we leave a detailed message on this number? YES    Call taken on 7/23/2024 at 2:18 PM by Chyna Robertson

## 2024-07-31 ENCOUNTER — ANTICOAGULATION THERAPY VISIT (OUTPATIENT)
Dept: ANTICOAGULATION | Facility: CLINIC | Age: 54
End: 2024-07-31
Payer: COMMERCIAL

## 2024-07-31 DIAGNOSIS — I82.402 DEEP VEIN THROMBOSIS (DVT) OF LEFT LOWER EXTREMITY, UNSPECIFIED CHRONICITY, UNSPECIFIED VEIN (H): ICD-10-CM

## 2024-07-31 DIAGNOSIS — Z79.01 LONG TERM CURRENT USE OF ANTICOAGULANT THERAPY: Primary | ICD-10-CM

## 2024-07-31 DIAGNOSIS — K51.919 ULCERATIVE COLITIS WITH COMPLICATION, UNSPECIFIED LOCATION (H): ICD-10-CM

## 2024-07-31 LAB — INR HOME MONITORING: 1.4 (ref 2–3)

## 2024-07-31 NOTE — PROGRESS NOTES
ANTICOAGULATION MANAGEMENT     Donato De Leon 54 year old male is on warfarin with subtherapeutic INR result. (Goal INR 2.0-3.0)    Recent labs: (last 7 days)     07/31/24  0000   INR 1.4*       ASSESSMENT     Source(s): Chart Review and Patient/Caregiver Call     Warfarin doses taken: More warfarin taken than planned which may be affecting INR - took 10 mg on Thursday and INR is lower today from last encounter.   Diet:  had a couple more protein shake, a little more salad in the last week.  Patient will be traveling for the next week and plan to eat less salad with no protein shake  Medication/supplement changes: None noted  New illness, injury, or hospitalization: No  Signs or symptoms of bleeding or clotting: No  Previous result: Subtherapeutic  Additional findings:  Patient will be traveling for 1 week on 8/3/24. It will be about 3 hours flight. Cellular and internet connection can be spotting where is is going.  Patient does not plan to take protein shake and will eat less salad while traveling.  With upcoming travel, will boost today and increase maintenance dose by around 4% from what he has been taking due to upcoming travel. Recheck on 8/12 when patient back from vacation       PLAN     Recommended plan for temporary change(s) affecting INR     Dosing Instructions: booster dose then Increase your warfarin dose (4% change) from what patient has been taking with next INR in 10 days       Summary  As of 7/31/2024      Full warfarin instructions:  7/31: 15 mg; Otherwise 7.5 mg every Mon; 10 mg all other days   Next INR check:  8/12/2024               Telephone call with Jerry who verbalizes understanding and agrees to plan    Patient to recheck with home meter    Education provided: Please call back if any changes to your diet, medications or how you've been taking warfarin  Dietary considerations: Impact of protein intake on INR   Symptom monitoring: monitoring for clotting signs and symptoms and travel  related clotting risk and prevention    Plan made per [facility] anticoagulation protocol    Anya Childers RN  Anticoagulation Clinic  7/31/2024    _______________________________________________________________________     Anticoagulation Episode Summary       Current INR goal:  2.0-3.0   TTR:  62.3% (1 y)   Target end date:  Indefinite   Send INR reminders to:  [facility]IT[facility]    Indications    Deep vein thrombosis (DVT) (H) [I82.409] (Resolved) [I82.409]  Long-term (current) use of anticoagulants [Z79.01] [Z79.01]  Deep vein thrombosis (DVT) of left lower extremity  unspecified chronicity  unspecified vein (H) [I82.402]  Ulcerative colitis with complication  unspecified location (H) [K51.919]  Acute deep vein thrombosis (DVT) of right lower extremity  unspecified vein (H) (Resolved) [I82.401]             Comments:  [facility] INR Monitoring patient. Monitor by exception. OK to leave DVM.             Anticoagulation Care Providers       Provider Role Specialty Phone number    Brody, Carter Romo PA-C Referring Family Medicine [phone]

## 2024-08-14 ENCOUNTER — DOCUMENTATION ONLY (OUTPATIENT)
Dept: ANTICOAGULATION | Facility: CLINIC | Age: 54
End: 2024-08-14
Payer: COMMERCIAL

## 2024-08-16 ENCOUNTER — TRANSFERRED RECORDS (OUTPATIENT)
Dept: HEALTH INFORMATION MANAGEMENT | Facility: CLINIC | Age: 54
End: 2024-08-16
Payer: COMMERCIAL

## 2024-08-16 ENCOUNTER — ANTICOAGULATION THERAPY VISIT (OUTPATIENT)
Dept: ANTICOAGULATION | Facility: CLINIC | Age: 54
End: 2024-08-16
Payer: COMMERCIAL

## 2024-08-16 DIAGNOSIS — Z79.01 LONG TERM CURRENT USE OF ANTICOAGULANT THERAPY: Primary | ICD-10-CM

## 2024-08-16 DIAGNOSIS — K51.919 ULCERATIVE COLITIS WITH COMPLICATION, UNSPECIFIED LOCATION (H): ICD-10-CM

## 2024-08-16 DIAGNOSIS — I82.402 DEEP VEIN THROMBOSIS (DVT) OF LEFT LOWER EXTREMITY, UNSPECIFIED CHRONICITY, UNSPECIFIED VEIN (H): ICD-10-CM

## 2024-08-16 LAB
ALT SERPL-CCNC: 24 IU/L (ref 0–44)
AST SERPL-CCNC: 17 IU/L (ref 0–40)
INR HOME MONITORING: 2.8 (ref 2–3)

## 2024-08-16 NOTE — PROGRESS NOTES
ANTICOAGULATION MANAGEMENT     Donato De Leon 54 year old male is on warfarin with therapeutic INR result. (Goal INR 2.0-3.0)    Recent labs: (last 7 days)     08/16/24  0000   INR 2.8       ASSESSMENT     Source(s): Chart Review and Patient/Caregiver Call     Warfarin doses taken: Less warfarin taken than planned which may be affecting INR  Diet: No new diet changes identified  Medication/supplement changes: None noted  New illness, injury, or hospitalization: No  Signs or symptoms of bleeding or clotting: No  Previous result: Subtherapeutic  Additional findings: None       PLAN     Recommended plan for no diet, medication or health factor changes affecting INR     Dosing Instructions: Continue your current warfarin dose with next INR in 2 weeks (calendar updated to reflect dosing that patient has been taking for the last 2 weeks, which is a 3.7% reduction from intended maintenance dose, but since INR is in range at this dose will continue)       Summary  As of 8/16/2024      Full warfarin instructions:  5 mg every Mon; 10 mg all other days   Next INR check:  8/30/2024               Telephone call with Jerry who verbalizes understanding and agrees to plan    Patient to recheck with home meter    Education provided: Resume manage by exception with home monitor. Continue to submit INR results to home monitor company.You will only be called when your result is out of range. Please call and notify [facility] if new medication started, dose missed, signs or symptoms of bleeding or clotting, or a surgery/procedure is scheduled. Due for next call no later than: 11/14/24.    Plan made per [facility] anticoagulation protocol    Margaret Mcmanus, RN  Anticoagulation Clinic  8/16/2024    _______________________________________________________________________     Anticoagulation Episode Summary       Current INR goal:  2.0-3.0   TTR:  64.0% (1 y)   Target end date:  Indefinite   Send INR reminders to:  [facility]     Indications    Deep vein thrombosis (DVT) (H) [I82.409] (Resolved) [I82.409]  Long-term (current) use of anticoagulants [Z79.01] [Z79.01]  Deep vein thrombosis (DVT) of left lower extremity  unspecified chronicity  unspecified vein (H) [I82.402]  Ulcerative colitis with complication  unspecified location (H) [K51.919]  Acute deep vein thrombosis (DVT) of right lower extremity  unspecified vein (H) (Resolved) [I82.401]             Comments:  [facility] INR Monitoring patient. Monitor by exception. OK to leave DVM.             Anticoagulation Care Providers       Provider Role Specialty Phone number    Carter Skinner PA-C Referring Family Medicine [phone]

## 2024-09-09 ENCOUNTER — MYC MEDICAL ADVICE (OUTPATIENT)
Dept: ANTICOAGULATION | Facility: CLINIC | Age: 54
End: 2024-09-09
Payer: COMMERCIAL

## 2024-09-09 NOTE — TELEPHONE ENCOUNTER
ANTICOAGULATION     Donato De Leon is overdue for an INR check.     [employer] message sent     Annette Preciado RN

## 2024-09-10 ENCOUNTER — DOCUMENTATION ONLY (OUTPATIENT)
Dept: ANTICOAGULATION | Facility: CLINIC | Age: 54
End: 2024-09-10
Payer: COMMERCIAL

## 2024-09-10 DIAGNOSIS — K51.919 ULCERATIVE COLITIS WITH COMPLICATION, UNSPECIFIED LOCATION (H): ICD-10-CM

## 2024-09-10 DIAGNOSIS — I82.402 DEEP VEIN THROMBOSIS (DVT) OF LEFT LOWER EXTREMITY, UNSPECIFIED CHRONICITY, UNSPECIFIED VEIN (H): ICD-10-CM

## 2024-09-10 DIAGNOSIS — Z79.01 LONG TERM CURRENT USE OF ANTICOAGULANT THERAPY: Primary | ICD-10-CM

## 2024-09-10 LAB — INR HOME MONITORING: 2 (ref 2–3)

## 2024-09-10 NOTE — PROGRESS NOTES
ANTICOAGULATION  MANAGEMENT-Home Monitor Managed by Exception    Donato EUGENE De Leon 54 year old male is on warfarin with therapeutic INR result. (Goal INR 2.0-3.0)    Recent labs: (last 7 days)     09/10/24  0000   INR 2.0       Previous INR was Therapeutic  Medication, diet, health changes since last INR:chart reviewed; none identified  Contacted within the last 12 weeks by phone on 8/16/24  Last [facility] referral date: 04/18/2024      JULEE Sewell was NOT contacted regarding therapeutic result today per home monitoring policy manage by exception agreement.   Current warfarin dose is to be continued:     Summary  As of 9/10/2024      Full warfarin instructions:  5 mg every Mon; 10 mg all other days   Next INR check:  9/24/2024             ?   Dotty Howell RN  Anticoagulation Clinic  9/10/2024    _______________________________________________________________________     Anticoagulation Episode Summary       Current INR goal:  2.0-3.0   TTR:  66.2% (1 y)   Target end date:  Indefinite   Send INR reminders to:  [facility] HOME MONITOR[facility]    Indications    Deep vein thrombosis (DVT) (H) [I82.409] (Resolved) [I82.409]  Long-term (current) use of anticoagulants [Z79.01] [Z79.01]  Deep vein thrombosis (DVT) of left lower extremity  unspecified chronicity  unspecified vein (H) [I82.402]  Ulcerative colitis with complication  unspecified location (H) [K51.919]  Acute deep vein thrombosis (DVT) of right lower extremity  unspecified vein (H) (Resolved) [I82.401]             Comments:  [facility] INR Monitoring patient. Monitor by exception. OK to leave DVM.             Anticoagulation Care Providers       Provider Role Specialty Phone number    Carter Skinner PA-C Referring Family Medicine [phone]

## 2024-10-01 ENCOUNTER — TELEPHONE (OUTPATIENT)
Dept: ANTICOAGULATION | Facility: CLINIC | Age: 54
End: 2024-10-01
Payer: COMMERCIAL

## 2024-10-01 ENCOUNTER — DOCUMENTATION ONLY (OUTPATIENT)
Dept: ANTICOAGULATION | Facility: CLINIC | Age: 54
End: 2024-10-01
Payer: COMMERCIAL

## 2024-10-01 DIAGNOSIS — K51.919 ULCERATIVE COLITIS WITH COMPLICATION, UNSPECIFIED LOCATION (H): ICD-10-CM

## 2024-10-01 DIAGNOSIS — I82.402 DEEP VEIN THROMBOSIS (DVT) OF LEFT LOWER EXTREMITY, UNSPECIFIED CHRONICITY, UNSPECIFIED VEIN (H): ICD-10-CM

## 2024-10-01 DIAGNOSIS — Z79.01 LONG TERM CURRENT USE OF ANTICOAGULANT THERAPY: Primary | ICD-10-CM

## 2024-10-01 LAB — INR HOME MONITORING: 2.2 (ref 2–3)

## 2024-10-01 NOTE — PROGRESS NOTES
ANTICOAGULATION  MANAGEMENT-Home Monitor Managed by Exception    Donato EUGENE De Leon 54 year old male is on warfarin with therapeutic INR result. (Goal INR 2.0-3.0)    Recent labs: (last 7 days)     10/01/24  0000   INR 2.2       Previous INR was Therapeutic  Medication, diet, health changes since last INR:chart reviewed; none identified  Contacted within the last 12 weeks by phone on 8/16/24  Last [facility] referral date: 04/18/2024      JULEE Sewell was NOT contacted regarding therapeutic result today per home monitoring policy manage by exception agreement.   Current warfarin dose is to be continued:     Summary  As of 10/1/2024      Full warfarin instructions:  5 mg every Mon; 10 mg all other days   Next INR check:  10/15/2024             ?   Barrera Jeter RN  Anticoagulation Clinic  10/1/2024    _______________________________________________________________________     Anticoagulation Episode Summary       Current INR goal:  2.0-3.0   TTR:  66.2% (1 y)   Target end date:  Indefinite   Send INR reminders to:  [facility]    Indications    Deep vein thrombosis (DVT) (H) [I82.409] (Resolved) [I82.409]  Long-term (current) use of anticoagulants [Z79.01] [Z79.01]  Deep vein thrombosis (DVT) of left lower extremity  unspecified chronicity  unspecified vein (H) [I82.402]  Ulcerative colitis with complication  unspecified location (H) [K51.919]  Acute deep vein thrombosis (DVT) of right lower extremity  unspecified vein (H) (Resolved) [I82.401]             Comments:  [facility] INR Monitoring patient. Monitor by exception. OK to leave DVM.             Anticoagulation Care Providers       Provider Role Specialty Phone number    Carter Skinner PA-C Referring Family Medicine [phone]

## 2024-10-01 NOTE — TELEPHONE ENCOUNTER
ANTICOAGULATION     Donato KNIGHT Moises is overdue for an INR check.     Left message reminding patient to check INR with their home meter and call results to the home monitoring company as soon as possible.     April Alvarado RN  10/1/2024  Anticoagulation Clinic  [facility] for routing messages: [facility]  [facility] patient phone line: [phone]

## 2024-10-04 DIAGNOSIS — E78.00 HIGH CHOLESTEROL: ICD-10-CM

## 2024-10-04 RX ORDER — ATORVASTATIN CALCIUM 40 MG/1
40 TABLET, FILM COATED ORAL DAILY
Qty: 90 TABLET | Refills: 0 | Status: SHIPPED | OUTPATIENT
Start: 2024-10-04 | End: 2024-10-23

## 2024-10-18 ENCOUNTER — DOCUMENTATION ONLY (OUTPATIENT)
Dept: ANTICOAGULATION | Facility: CLINIC | Age: 54
End: 2024-10-18
Payer: COMMERCIAL

## 2024-10-18 DIAGNOSIS — K51.919 ULCERATIVE COLITIS WITH COMPLICATION, UNSPECIFIED LOCATION (H): ICD-10-CM

## 2024-10-18 DIAGNOSIS — I82.402 DEEP VEIN THROMBOSIS (DVT) OF LEFT LOWER EXTREMITY, UNSPECIFIED CHRONICITY, UNSPECIFIED VEIN (H): ICD-10-CM

## 2024-10-18 DIAGNOSIS — Z79.01 LONG TERM CURRENT USE OF ANTICOAGULANT THERAPY: Primary | ICD-10-CM

## 2024-10-18 LAB — INR HOME MONITORING: 2.5 (ref 2–3)

## 2024-10-18 NOTE — PROGRESS NOTES
ANTICOAGULATION  MANAGEMENT-Home Monitor Managed by Exception    Donato EUGENE De Leon 54 year old male is on warfarin with therapeutic INR result. (Goal INR 2.0-3.0)    Recent labs: (last 7 days)     10/18/24  0000   INR 2.5       Previous INR was Therapeutic  Medication, diet, health changes since last INR:chart reviewed; none identified  Contacted within the last 12 weeks by phone on 8/16/24  Due for next call no later than: 11/14/24.   Last [facility] referral date: 04/18/2024      JULEE Sewell was NOT contacted regarding therapeutic result today per home monitoring policy manage by exception agreement.   Current warfarin dose is to be continued:     Summary  As of 10/18/2024      Full warfarin instructions:  5 mg every Mon; 10 mg all other days   Next INR check:  11/1/2024             ?   Annette Alexis RN  Anticoagulation Clinic  10/18/2024    _______________________________________________________________________     Anticoagulation Episode Summary       Current INR goal:  2.0-3.0   TTR:  67.5% (1 y)   Target end date:  Indefinite   Send INR reminders to:  [facility]    Indications    Deep vein thrombosis (DVT) (H) [I82.409] (Resolved) [I82.409]  Long-term (current) use of anticoagulants [Z79.01] [Z79.01]  Deep vein thrombosis (DVT) of left lower extremity  unspecified chronicity  unspecified vein (H) [I82.402]  Ulcerative colitis with complication  unspecified location (H) [K51.919]  Acute deep vein thrombosis (DVT) of right lower extremity  unspecified vein (H) (Resolved) [I82.401]             Comments:  [facility] INR Monitoring patient. Monitor by exception. OK to leave DVM.             Anticoagulation Care Providers       Provider Role Specialty Phone number    Carter Skinner PA-C Referring Family Medicine [phone]

## 2024-10-23 ENCOUNTER — OFFICE VISIT (OUTPATIENT)
Dept: FAMILY MEDICINE | Facility: CLINIC | Age: 54
End: 2024-10-23
Payer: COMMERCIAL

## 2024-10-23 VITALS
RESPIRATION RATE: 18 BRPM | HEIGHT: 73 IN | OXYGEN SATURATION: 99 % | WEIGHT: 244 LBS | BODY MASS INDEX: 32.34 KG/M2 | SYSTOLIC BLOOD PRESSURE: 114 MMHG | TEMPERATURE: 96 F | DIASTOLIC BLOOD PRESSURE: 80 MMHG | HEART RATE: 101 BPM

## 2024-10-23 DIAGNOSIS — E66.01 MORBID OBESITY (H): ICD-10-CM

## 2024-10-23 DIAGNOSIS — Z00.00 ROUTINE GENERAL MEDICAL EXAMINATION AT A HEALTH CARE FACILITY: Primary | ICD-10-CM

## 2024-10-23 DIAGNOSIS — Z79.01 LONG TERM CURRENT USE OF ANTICOAGULANT THERAPY: ICD-10-CM

## 2024-10-23 DIAGNOSIS — Z86.718 PERSONAL HISTORY OF DVT (DEEP VEIN THROMBOSIS): ICD-10-CM

## 2024-10-23 DIAGNOSIS — E78.00 HIGH CHOLESTEROL: ICD-10-CM

## 2024-10-23 DIAGNOSIS — Z13.1 SCREENING FOR DIABETES MELLITUS: ICD-10-CM

## 2024-10-23 DIAGNOSIS — K51.919 ULCERATIVE COLITIS WITH COMPLICATION, UNSPECIFIED LOCATION (H): ICD-10-CM

## 2024-10-23 PROBLEM — I82.402 DEEP VEIN THROMBOSIS (DVT) OF LEFT LOWER EXTREMITY, UNSPECIFIED CHRONICITY, UNSPECIFIED VEIN (H): Status: RESOLVED | Noted: 2017-07-11 | Resolved: 2024-10-23

## 2024-10-23 LAB
ALBUMIN SERPL BCG-MCNC: 4.6 G/DL (ref 3.5–5.2)
ALP SERPL-CCNC: 58 U/L (ref 40–150)
ALT SERPL W P-5'-P-CCNC: 26 U/L (ref 0–70)
ANION GAP SERPL CALCULATED.3IONS-SCNC: 12 MMOL/L (ref 7–15)
AST SERPL W P-5'-P-CCNC: 27 U/L (ref 0–45)
BILIRUB SERPL-MCNC: 1.1 MG/DL
BUN SERPL-MCNC: 20.6 MG/DL (ref 6–20)
CALCIUM SERPL-MCNC: 10.3 MG/DL (ref 8.8–10.4)
CHLORIDE SERPL-SCNC: 105 MMOL/L (ref 98–107)
CHOLEST SERPL-MCNC: 194 MG/DL
CREAT SERPL-MCNC: 1.46 MG/DL (ref 0.67–1.17)
EGFRCR SERPLBLD CKD-EPI 2021: 57 ML/MIN/1.73M2
EST. AVERAGE GLUCOSE BLD GHB EST-MCNC: 108 MG/DL
FASTING STATUS PATIENT QL REPORTED: YES
FASTING STATUS PATIENT QL REPORTED: YES
GLUCOSE SERPL-MCNC: 96 MG/DL (ref 70–99)
HBA1C MFR BLD: 5.4 % (ref 0–5.6)
HCO3 SERPL-SCNC: 25 MMOL/L (ref 22–29)
HDLC SERPL-MCNC: 63 MG/DL
LDLC SERPL CALC-MCNC: 119 MG/DL
NONHDLC SERPL-MCNC: 131 MG/DL
POTASSIUM SERPL-SCNC: 5.2 MMOL/L (ref 3.4–5.3)
PROT SERPL-MCNC: 7.9 G/DL (ref 6.4–8.3)
SODIUM SERPL-SCNC: 142 MMOL/L (ref 135–145)
TRIGL SERPL-MCNC: 58 MG/DL

## 2024-10-23 PROCEDURE — 80061 LIPID PANEL: CPT | Performed by: PHYSICIAN ASSISTANT

## 2024-10-23 PROCEDURE — 99396 PREV VISIT EST AGE 40-64: CPT | Performed by: PHYSICIAN ASSISTANT

## 2024-10-23 PROCEDURE — 36415 COLL VENOUS BLD VENIPUNCTURE: CPT | Performed by: PHYSICIAN ASSISTANT

## 2024-10-23 PROCEDURE — 83036 HEMOGLOBIN GLYCOSYLATED A1C: CPT | Performed by: PHYSICIAN ASSISTANT

## 2024-10-23 PROCEDURE — 99213 OFFICE O/P EST LOW 20 MIN: CPT | Mod: 25 | Performed by: PHYSICIAN ASSISTANT

## 2024-10-23 PROCEDURE — 80053 COMPREHEN METABOLIC PANEL: CPT | Performed by: PHYSICIAN ASSISTANT

## 2024-10-23 RX ORDER — USTEKINUMAB 90 MG/ML
90 INJECTION, SOLUTION SUBCUTANEOUS
COMMUNITY
Start: 2024-02-12

## 2024-10-23 RX ORDER — ATORVASTATIN CALCIUM 40 MG/1
40 TABLET, FILM COATED ORAL DAILY
Qty: 90 TABLET | Refills: 2 | Status: SHIPPED | OUTPATIENT
Start: 2024-10-23 | End: 2024-10-30

## 2024-10-23 SDOH — HEALTH STABILITY: PHYSICAL HEALTH: ON AVERAGE, HOW MANY DAYS PER WEEK DO YOU ENGAGE IN MODERATE TO STRENUOUS EXERCISE (LIKE A BRISK WALK)?: 6 DAYS

## 2024-10-23 ASSESSMENT — PAIN SCALES - GENERAL: PAINLEVEL_OUTOF10: NO PAIN (0)

## 2024-10-23 ASSESSMENT — SOCIAL DETERMINANTS OF HEALTH (SDOH): HOW OFTEN DO YOU GET TOGETHER WITH FRIENDS OR RELATIVES?: ONCE A WEEK

## 2024-10-23 NOTE — PATIENT INSTRUCTIONS
Patient Education   Preventive Care Advice   This is general advice given by our system to help you stay healthy. However, your care team may have specific advice just for you. Please talk to your care team about your preventive care needs.  Nutrition  Eat 5 or more servings of fruits and vegetables each day.  Try wheat bread, brown rice and whole grain pasta (instead of white bread, rice, and pasta).  Get enough calcium and vitamin D. Check the label on foods and aim for 100% of the RDA (recommended daily allowance).  Lifestyle  Exercise at least 150 minutes each week  (30 minutes a day, 5 days a week).  Do muscle strengthening activities 2 days a week. These help control your weight and prevent disease.  No smoking.  Wear sunscreen to prevent skin cancer.  Have a dental exam and cleaning every 6 months.  Yearly exams  See your health care team every year to talk about:  Any changes in your health.  Any medicines your care team has prescribed.  Preventive care, family planning, and ways to prevent chronic diseases.  Shots (vaccines)   HPV shots (up to age 26), if you've never had them before.  Hepatitis B shots (up to age 59), if you've never had them before.  COVID-19 shot: Get this shot when it's due.  Flu shot: Get a flu shot every year.  Tetanus shot: Get a tetanus shot every 10 years.  Pneumococcal, hepatitis A, and RSV shots: Ask your care team if you need these based on your risk.  Shingles shot (for age 50 and up)  General health tests  Diabetes screening:  Starting at age 35, Get screened for diabetes at least every 3 years.  If you are younger than age 35, ask your care team if you should be screened for diabetes.  Cholesterol test: At age 39, start having a cholesterol test every 5 years, or more often if advised.  Bone density scan (DEXA): At age 50, ask your care team if you should have this scan for osteoporosis (brittle bones).  Hepatitis C: Get tested at least once in your life.  STIs (sexually  transmitted infections)  Before age 24: Ask your care team if you should be screened for STIs.  After age 24: Get screened for STIs if you're at risk. You are at risk for STIs (including HIV) if:  You are sexually active with more than one person.  You don't use condoms every time.  You or a partner was diagnosed with a sexually transmitted infection.  If you are at risk for HIV, ask about PrEP medicine to prevent HIV.  Get tested for HIV at least once in your life, whether you are at risk for HIV or not.  Cancer screening tests  Cervical cancer screening: If you have a cervix, begin getting regular cervical cancer screening tests starting at age 21.  Breast cancer scan (mammogram): If you've ever had breasts, begin having regular mammograms starting at age 40. This is a scan to check for breast cancer.  Colon cancer screening: It is important to start screening for colon cancer at age 45.  Have a colonoscopy test every 10 years (or more often if you're at risk) Or, ask your provider about stool tests like a FIT test every year or Cologuard test every 3 years.  To learn more about your testing options, visit:   .  For help making a decision, visit:   https://bit.ly/hg32624.  Prostate cancer screening test: If you have a prostate, ask your care team if a prostate cancer screening test (PSA) at age 55 is right for you.  Lung cancer screening: If you are a current or former smoker ages 50 to 80, ask your care team if ongoing lung cancer screenings are right for you.  For informational purposes only. Not to replace the advice of your health care provider. Copyright   2023 [facility]. All rights reserved. Clinically reviewed by the [facility]. [employer] 832775 - REV 01/24.  Substance Use Disorder: Care Instructions  Overview     You can improve your life and health by stopping your use of alcohol or drugs. When you don't drink or use drugs, you may feel and sleep better. You may  get along better with your family, friends, and coworkers. There are medicines and programs that can help with substance use disorder.  How can you care for yourself at home?  Here are some ways to help you stay sober and prevent relapse.  If you have been given medicine to help keep you sober or reduce your cravings, be sure to take it exactly as prescribed.  Talk to your doctor about programs that can help you stop using drugs or drinking alcohol.  Do not keep alcohol or drugs in your home.  Plan ahead. Think about what you'll say if other people ask you to drink or use drugs. Try not to spend time with people who drink or use drugs.  Use the time and money spent on drinking or drugs to do something that's important to you.  Preventing a relapse  Have a plan to deal with relapse. Learn to recognize changes in your thinking that lead you to drink or use drugs. Get help before you start to drink or use drugs again.  Try to stay away from situations, friends, or places that may lead you to drink or use drugs.  If you feel the need to drink alcohol or use drugs again, seek help right away. Call a trusted friend or family member. Some people get support from organizations such as Narcotics Anonymous or [employer] or from treatment facilities.  If you relapse, get help as soon as you can. Some people make a plan with another person that outlines what they want that person to do for them if they relapse. The plan usually includes how to handle the relapse and who to notify in case of relapse.  Don't give up. Remember that a relapse doesn't mean that you have failed. Use the experience to learn the triggers that lead you to drink or use drugs. Then quit again. Recovery is a lifelong process. Many people have several relapses before they are able to quit for good.  Follow-up care is a key part of your treatment and safety. Be sure to make and go to all appointments, and call your doctor if you are having problems. It's  "also a good idea to know your test results and keep a list of the medicines you take.  When should you call for help?   Call 911  anytime you think you may need emergency care. For example, call if you or someone else:    Has overdosed or has withdrawal signs. Be sure to tell the emergency workers that you are or someone else is using or trying to quit using drugs. Overdose or withdrawal signs may include:  Losing consciousness.  Seizure.  Seeing or hearing things that aren't there (hallucinations).     Is thinking or talking about suicide or harming others.   Where to get help 24 hours a day, 7 days a week   If you or someone you know talks about suicide, self-harm, a mental health crisis, a substance use crisis, or any other kind of emotional distress, get help right away. You can:    Call the Suicide and Crisis Lifeline at 988.     Call 9-054-424-TALK (1-742.762.4320).     Text HOME to 850205 to access the Crisis Text Line.   Consider saving these numbers in your phone.  Go to H5 for more information or to chat online.  Call your doctor now or seek immediate medical care if:    You are having withdrawal symptoms. These may include nausea or vomiting, sweating, shakiness, and anxiety.   Watch closely for changes in your health, and be sure to contact your doctor if:    You have a relapse.     You need more help or support to stop.   Where can you learn more?  Go to https://www.[employer].net/patiented  Enter H573 in the search box to learn more about \"Substance Use Disorder: Care Instructions.\"  Current as of: November 15, 2023  Content Version: 14.2 2024 [facility].   Care instructions adapted under license by your healthcare professional. If you have questions about a medical condition or this instruction, always ask your healthcare professional. [employer] disclaims any warranty or liability for your use of this information.       "

## 2024-10-23 NOTE — PROGRESS NOTES
"Preventive Care Visit  [facility]  Carter Skinner PA-C, Family Medicine  Oct 23, 2024      Assessment & Plan       ICD-10-CM    1. Routine general medical examination at a health care facility  Z00.00       2. High cholesterol  E78.00 Lipid panel reflex to direct LDL Non-fasting     Comprehensive metabolic panel (BMP + Alb, Alk Phos, ALT, AST, Total. Bili, TP)     atorvastatin (LIPITOR) 40 MG tablet     Lipid panel reflex to direct LDL Non-fasting     Comprehensive metabolic panel (BMP + Alb, Alk Phos, ALT, AST, Total. Bili, TP)     OFFICE/OUTPT VISIT,EST,LEVL III      3. Ulcerative colitis with complication, unspecified location (H)  K51.919       4. Long-term (current) use of anticoagulants [Z79.01]  Z79.01 OFFICE/OUTPT VISIT,EST,LEVL III      5. Personal history of DVT (deep vein thrombosis)  Z86.718 OFFICE/OUTPT VISIT,EST,LEVL III      6. Screening for diabetes mellitus  Z13.1 Hemoglobin A1c     Hemoglobin A1c      7. Morbid obesity (H)  E66.01       Work on Healthy diet and exercise. Getting heart rate elevated for 30 mins most days of week.  2. medical conditions are stable. meds refilled.  3. Con't care with specialist.   4-5. medical conditions are stable. meds refilled.  Recheck yearly        BMI  Estimated body mass index is 32.19 kg/m  as calculated from the following:    Height as of this encounter: 1.854 m (6' 1\").    Weight as of this encounter: 110.7 kg (244 lb).   Weight management plan: Discussed healthy diet and exercise guidelines    Counseling  Appropriate preventive services were addressed with this patient via screening, questionnaire, or discussion as appropriate for fall prevention, nutrition, physical activity, Tobacco-use cessation, social engagement, weight loss and cognition.  Checklist reviewing preventive services available has been given to the patient.  Reviewed patient's diet, addressing concerns and/or questions.   He is at risk for psychosocial distress " and has been provided with information to reduce risk.       Subjective   Jerry is a 54 year old, presenting for the following:  Physical        10/23/2024     7:30 AM   Additional Questions   Roomed by jamilah   Accompanied by self         10/23/2024   Forms   Any forms needing to be completed Yes          10/23/2024     7:30 AM   Patient Reported Additional Medications   Patient reports taking the following new medications no          HPI    Hyperlipidemia Follow-Up    Are you regularly taking any medication or supplement to lower your cholesterol?   Yes- lipitor  Are you having muscle aches or other side effects that you think could be caused by your cholesterol lowering medication?  No  Patient has a history of ulcerative colitis which is managed by gastroenterology.  He also has a history of DVT in the past and on long-term anticoagulation therapy.    Health Care Directive  Patient does not have a Health Care Directive: Discussed advance care planning with patient; information given to patient to review.      10/23/2024   General Health   How would you rate your overall physical health? Good   Feel stress (tense, anxious, or unable to sleep) Only a little      (!) STRESS CONCERN      10/23/2024   Nutrition   Three or more servings of calcium each day? Yes   Diet: Regular (no restrictions)   How many servings of fruit and vegetables per day? (!) 2-3   How many sweetened beverages each day? 0-1            10/23/2024   Exercise   Days per week of moderate/strenous exercise 6 days            10/23/2024   Social Factors   Frequency of gathering with friends or relatives Once a week   Worry food won't last until get money to buy more No   Food not last or not have enough money for food? No   Do you have housing? (Housing is defined as stable permanent housing and does not include staying ouside in a car, in a tent, in an abandoned building, in an overnight shelter, or couch-surfing.) Yes   Are you worried about losing  your housing? No   Lack of transportation? No   Unable to get utilities (heat,electricity)? No            10/23/2024   Fall Risk   Fallen 2 or more times in the past year? No    Trouble with walking or balance? No        Patient-reported          10/23/2024   Dental   Dentist two times every year? Yes            10/23/2024   TB Screening   Were you born outside of the [place]? No            Today's PHQ-2 Score:       10/23/2024     7:29 AM   PHQ-2 ( 1999 [employer])   Q1: Little interest or pleasure in doing things 0    Q2: Feeling down, depressed or hopeless 0    PHQ-2 Score 0    Q1: Little interest or pleasure in doing things Not at all   Q2: Feeling down, depressed or hopeless Not at all   PHQ-2 Score 0       Patient-reported           10/23/2024   Substance Use   Alcohol more than 3/day or more than 7/wk No   Do you use any other substances recreationally? No    (!) PRESCRIPTION DRUGS       Multiple values from one day are sorted in reverse-chronological order     Social History     Tobacco Use    Smoking status: Never    Smokeless tobacco: Never   Vaping Use    Vaping status: Never Used   Substance Use Topics    Alcohol use: Yes     Comment: rare    Drug use: No             10/23/2024   One time HIV Screening   Previous HIV test? No          10/23/2024   STI Screening   New sexual partner(s) since last STI/HIV test? No      ASCVD Risk   The 10-year ASCVD risk score (Corine RICO, et al., 2019) is: 4%    Values used to calculate the score:      Age: 54 years      Sex: Male      Is Non- : No      Diabetic: No      Tobacco smoker: No      Systolic Blood Pressure: 114 mmHg      Is BP treated: No      HDL Cholesterol: 71 mg/dL      Total Cholesterol: 251 mg/dL           Reviewed and updated as needed this visit by Provider   Tobacco  Allergies  Meds  Problems  Med Hx  Surg Hx  Fam Hx            Past Medical History:   Diagnosis Date    Acute gout of right foot, unspecified cause 5/3/2017     Colitis, ulcerative (H)     Deep vein thrombosis (DVT) of left lower extremity, unspecified chronicity, unspecified vein (H) 7/11/2017    Long-term (current) use of anticoagulants [Z79.01] 3/29/2017    Renal lithiasis      Past Surgical History:   Procedure Laterality Date    HC FRAGMENTING OF KIDNEY STONE      SKIN CANCER SCREENING      basal cell      Lab work is in process  Labs reviewed in EPIC  BP Readings from Last 3 Encounters:   10/23/24 114/80   10/18/23 124/85   09/09/22 (!) 150/104    Wt Readings from Last 3 Encounters:   10/23/24 110.7 kg (244 lb)   10/18/23 122.5 kg (270 lb)   09/09/22 117.9 kg (260 lb)                  Patient Active Problem List   Diagnosis    CARDIOVASCULAR SCREENING; LDL GOAL LESS THAN 160    Cellulitis of arm, right    Plantar fasciitis    Renal lithiasis    Gouty arthritis of toe of right foot    BMI 32.0-32.9,adult    Ulcerative colitis with complication, unspecified location (H)    Long-term (current) use of anticoagulants [Z79.01]    Acute gout of right foot, unspecified cause    Elevated LFTs    High cholesterol    Morbid obesity (H)    Left foot pain    Personal history of DVT (deep vein thrombosis)     Past Surgical History:   Procedure Laterality Date    HC FRAGMENTING OF KIDNEY STONE      SKIN CANCER SCREENING      basal cell        Social History     Tobacco Use    Smoking status: Never    Smokeless tobacco: Never   Substance Use Topics    Alcohol use: Yes     Comment: rare     Family History   Problem Relation Age of Onset    Clotting Disorder Sister          Current Outpatient Medications   Medication Sig Dispense Refill    Acetaminophen (TYLENOL PO) Take by mouth as needed for mild pain or fever      atorvastatin (LIPITOR) 40 MG tablet Take 1 tablet (40 mg) by mouth daily. 90 tablet 2    STELARA 90 MG/ML Inject 90 mg subcutaneously once every six weeks.      warfarin ANTICOAGULANT (COUMADIN) 5 MG tablet Take 1 tablet (5 mg) by mouth every Mon, Wed, Fri; and take 2  "tablets (10 mg) all other days of the week or as directed by your ACC team based on INR results. 190 tablet 1     Allergies   Allergen Reactions    Nkda [No Known Drug Allergy]      Recent Labs   Lab Test 10/18/23  1441 03/16/22  0927 08/11/21  1116 08/11/21  1116 01/21/21  0000 12/16/19  0000 08/01/18  0927 03/29/17  1044   * 63  --  194*  --   --  125* 100*   HDL 71 77  --  77  --   --  92 51   TRIG 49 81  --  86  --   --  57 82   ALT 32 62  --   --  38   < > 72*  --    CR 1.31* 1.23   < > 1.39*  --    < > 1.16 1.31*   GFRESTIMATED 65 71   < > 58*  --   --  67 59*   GFRESTBLACK  --   --   --   --   --   --  81 71   POTASSIUM 5.1 4.5   < > 4.4  --   --  4.5 4.0    < > = values in this interval not displayed.          Review of Systems  Constitutional, HEENT, cardiovascular, pulmonary, gi and gu systems are negative, except as otherwise noted.     Objective    Exam  /80   Pulse 101   Temp (!) 96  F (35.6  C) (Tympanic)   Resp 18   Ht 1.854 m (6' 1\")   Wt 110.7 kg (244 lb)   SpO2 99%   BMI 32.19 kg/m     Estimated body mass index is 32.19 kg/m  as calculated from the following:    Height as of this encounter: 1.854 m (6' 1\").    Weight as of this encounter: 110.7 kg (244 lb).    Physical Exam  GENERAL: alert and no distress  EYES: Eyes grossly normal to inspection, PERRL and conjunctivae and sclerae normal  HENT: ear canals and TM's normal, nose and mouth without ulcers or lesions  NECK: no adenopathy, no asymmetry, masses, or scars  RESP: lungs clear to auscultation - no rales, rhonchi or wheezes  CV: regular rate and rhythm, normal S1 S2, no S3 or S4, no murmur, click or rub, no peripheral edema  ABDOMEN: soft, nontender, no hepatosplenomegaly, no masses and bowel sounds normal   (male): normal male genitalia without lesions or urethral discharge, no hernia  MS: no gross musculoskeletal defects noted, no edema  SKIN: no suspicious lesions or rashes  NEURO: Normal strength and tone, mentation " intact and speech normal  PSYCH: mentation appears normal, affect normal/bright        Signed Electronically by: Carter Skinner PA-C

## 2024-10-28 DIAGNOSIS — E78.00 HIGH CHOLESTEROL: ICD-10-CM

## 2024-10-28 DIAGNOSIS — I82.402 DEEP VEIN THROMBOSIS (DVT) OF LEFT LOWER EXTREMITY, UNSPECIFIED CHRONICITY, UNSPECIFIED VEIN (H): ICD-10-CM

## 2024-10-30 RX ORDER — WARFARIN SODIUM 5 MG/1
TABLET ORAL
Refills: 0 | OUTPATIENT
Start: 2024-10-30

## 2024-10-30 RX ORDER — ATORVASTATIN CALCIUM 40 MG/1
40 TABLET, FILM COATED ORAL DAILY
Qty: 90 TABLET | Refills: 3 | Status: SHIPPED | OUTPATIENT
Start: 2024-10-30

## 2024-11-08 ENCOUNTER — ANTICOAGULATION THERAPY VISIT (OUTPATIENT)
Dept: ANTICOAGULATION | Facility: CLINIC | Age: 54
End: 2024-11-08
Payer: COMMERCIAL

## 2024-11-08 DIAGNOSIS — Z79.01 LONG TERM CURRENT USE OF ANTICOAGULANT THERAPY: Primary | ICD-10-CM

## 2024-11-08 DIAGNOSIS — K51.919 ULCERATIVE COLITIS WITH COMPLICATION, UNSPECIFIED LOCATION (H): ICD-10-CM

## 2024-11-08 LAB — INR HOME MONITORING: 2 (ref 2–3)

## 2024-11-08 NOTE — PROGRESS NOTES
ANTICOAGULATION MANAGEMENT     Donato De Leon 54 year old male is on warfarin with therapeutic INR result. (Goal INR 2.0-3.0)    Recent labs: (last 7 days)     11/08/24  0000   INR 2.0       ASSESSMENT     Source(s): Chart Review  Previous INR was Therapeutic last 2(+) visits  Medication, diet, health changes since last INR chart reviewed; none identified  Patient due for 12 week manage by exception follow up call         PLAN     Unable to reach Jerry today.    Left message to continue current warfarin dose  this weekend. Request call back for assessment.    Follow up required to assess for changes  and discuss dosing instructions and confirm understanding of instructions    My Chart message sent.    Dotty Howell RN  11/8/2024  Anticoagulation Clinic  [facility] for routing messages: [facility]  ACC patient phone line: [phone]

## 2024-11-11 ENCOUNTER — TELEPHONE (OUTPATIENT)
Dept: ANTICOAGULATION | Facility: CLINIC | Age: 54
End: 2024-11-11
Payer: COMMERCIAL

## 2024-11-11 NOTE — TELEPHONE ENCOUNTER
[facility]-PROCEDURAL ANTICOAGULATION  MANAGEMENT    [facility] requesting pre-procedure hold orders for warfarin and review for bridging      Procedure date: 12/20/24       Procedure: Colonoscopy      Procedure location and phone number (if external): [facility]  ([phone])     Number of warfarin hold days requested and/or target INR: 4 days    Pre-op date: Not applicable      Routing to Anticoagulation Pharmacist for review.     ACC pool: p ANTICOAG HOME MONITORING     Key Blanco RN

## 2024-11-11 NOTE — TELEPHONE ENCOUNTER
Patient calling INR nurse back but no phone number for INR nurse listed.  Please call him back at [phone]

## 2024-11-11 NOTE — PROGRESS NOTES
ANTICOAGULATION MANAGEMENT     Donato De Leon 54 year old male is on warfarin with therapeutic INR result. (Goal INR 2.0-3.0)    Recent labs: (last 7 days)     11/08/24  0000   INR 2.0       ASSESSMENT     Source(s): Chart Review and Patient/Caregiver Call     Warfarin doses taken: Warfarin taken as instructed  Diet: No new diet changes identified  Medication/supplement changes: None noted  New illness, injury, or hospitalization: No  Signs or symptoms of bleeding or clotting: No  Previous result: Therapeutic last 2(+) visits  Additional findings: Upcoming surgery/procedure 12/20/24 , Colonoscopy, patient made aware he will receive call from [facility] regarding HOLD plan closer to procedure  12 week manage by exception follow up       PLAN     Recommended plan for no diet, medication or health factor changes affecting INR     Dosing Instructions: Continue your current warfarin dose with next INR in 2 weeks       Summary  As of 11/8/2024      Full warfarin instructions:  5 mg every Mon; 10 mg all other days   Next INR check:  11/22/2024               Telephone call with Jerry who agrees to plan and repeated back plan correctly    Patient to recheck with home meter    Education provided: Please call back if any changes to your diet, medications or how you've been taking warfarin  Resume manage by exception with home monitor. Continue to submit INR results to home monitor company.You will only be called when your result is out of range. Please call and notify [facility] if new medication started, dose missed, signs or symptoms of bleeding or clotting, or a surgery/procedure is scheduled. Due for next call no later than: 2/9/25.    Plan made per [facility] anticoagulation protocol    Dotty Howell RN  11/11/2024  Anticoagulation Clinic  [facility] for routing messages: [facility]  [facility] patient phone line: [phone]        _______________________________________________________________________     Anticoagulation Episode  Summary       Current INR goal:  2.0-3.0   TTR:  73.8% (1 y)   Target end date:  Indefinite   Send INR reminders to:  ANTICOAG HOME MONITORING    Indications    Deep vein thrombosis (DVT) (H) [I82.409] (Resolved) [I82.409]  Long-term (current) use of anticoagulants [Z79.01] [Z79.01]  Deep vein thrombosis (DVT) of left lower extremity  unspecified chronicity  unspecified vein (H) (Resolved) [I82.402]  Ulcerative colitis with complication  unspecified location (H) [K51.919]  Acute deep vein thrombosis (DVT) of right lower extremity  unspecified vein (H) (Resolved) [I82.401]             Comments:  [facility] INR Monitoring patient. Monitor by exception. OK to leave DVM.             Anticoagulation Care Providers       Provider Role Specialty Phone number    Carter Skinner PA-C Referring Family Medicine [phone]

## 2024-11-25 DIAGNOSIS — I82.402 DEEP VEIN THROMBOSIS (DVT) OF LEFT LOWER EXTREMITY, UNSPECIFIED CHRONICITY, UNSPECIFIED VEIN (H): ICD-10-CM

## 2024-11-25 RX ORDER — WARFARIN SODIUM 5 MG/1
TABLET ORAL
Qty: 180 TABLET | Refills: 1 | Status: SHIPPED | OUTPATIENT
Start: 2024-11-25

## 2024-11-25 NOTE — TELEPHONE ENCOUNTER
ANTICOAGULATION MANAGEMENT:  Medication Refill    Anticoagulation Summary  As of 11/8/2024      Warfarin maintenance plan:  5 mg (5 mg x 1) every Mon; 10 mg (5 mg x 2) all other days   Next INR check:  11/22/2024   Target end date:  Indefinite    Indications    Deep vein thrombosis (DVT) (H) [I82.409] (Resolved) [I82.409]  Long-term (current) use of anticoagulants [Z79.01] [Z79.01]  Deep vein thrombosis (DVT) of left lower extremity  unspecified chronicity  unspecified vein (H) (Resolved) [I82.402]  Ulcerative colitis with complication  unspecified location (H) [K51.919]  Acute deep vein thrombosis (DVT) of right lower extremity  unspecified vein (H) (Resolved) [I82.401]                 Anticoagulation Care Providers       Provider Role Specialty Phone number    Carter Skinner PA-C Referring Family Medicine [phone]            Refill Criteria    Visit with referring provider/group: Meets criteria: visit within referring provider group in the last 15 months on 10/23/24    ACC referral last signed: 04/18/2024; within last year:  Yes    Lab monitoring not exceeding 2 weeks overdue: Yes    Donato meets all criteria for refill. Rx instructions and quantity supplied updated to match patient's current dosing plan. Warfarin 90 day supply with 1 refill granted per [facility] protocol     Eliza Donovan, RN  Anticoagulation Clinic

## 2024-12-09 ENCOUNTER — TELEPHONE (OUTPATIENT)
Dept: ANTICOAGULATION | Facility: CLINIC | Age: 54
End: 2024-12-09
Payer: COMMERCIAL

## 2024-12-09 DIAGNOSIS — Z79.01 LONG TERM CURRENT USE OF ANTICOAGULANT THERAPY: Primary | ICD-10-CM

## 2024-12-09 DIAGNOSIS — K51.919 ULCERATIVE COLITIS WITH COMPLICATION, UNSPECIFIED LOCATION (H): ICD-10-CM

## 2024-12-09 NOTE — TELEPHONE ENCOUNTER
"[facility]-PROCEDURAL ANTICOAGULATION  MANAGEMENT    ASSESSMENT     Warfarin interruption plan for colonoscopy on 2024.    Indication for Anticoagulation: DVT    DVT 2017, provoked with UC flare   Successfully tolerated hold, no bridge 2021, 2022, 2023 for colonoscopy    [facility]-Procedure Risk stratification for thromboembolism: low ( Chest guidelines)    VTE:  CHEST Perioperative Management guidelines suggest against bridging for patients with Hx of VTE as sole clinical indication for warfarin except in high risk stratification patients.    RECOMMENDATION     Pre-Procedure:  Hold warfarin for 4 days, until after procedure startin2024   No Bridge    Post-Procedure:  Resume warfarin dose if okay with provider doing procedure on night of procedure, 2024 PM: 20mg  Recheck INR ~ 7 days after resuming warfarin     Plan routed to referring provider for approval  ?   [facility]    SUBJECTIVE/OBJECTIVE     Donato EUGENE De Leon, a 54 year old male    Goal INR Range: 2.0-3.0     Patient bridged in past: Yes: last  before updated guidelines       Wt Readings from Last 3 Encounters:   10/23/24 110.7 kg (244 lb)   10/18/23 122.5 kg (270 lb)   22 117.9 kg (260 lb)      Ideal body weight: 79.9 kg (176 lb 2.4 oz)  Adjusted ideal body weight: 92.2 kg (203 lb 4.6 oz)     Estimated body mass index is 32.19 kg/m  as calculated from the following:    Height as of 10/23/24: 1.854 m (6' 1\").    Weight as of 10/23/24: 110.7 kg (244 lb).    Lab Results   Component Value Date    INR 3.0 2024    INR 2.0 2024    INR 2.5 10/18/2024     Lab Results   Component Value Date    HGB 16.1 2022    HCT 46.8 2022     2022     Lab Results   Component Value Date    CR 1.46 (H) 10/23/2024    CR 1.31 (H) 10/18/2023    CR 1.23 2022     Estimated Creatinine Clearance: 75.4 mL/min (A) (based on SCr of 1.46 mg/dL (H)).    "

## 2024-12-09 NOTE — TELEPHONE ENCOUNTER
[facility]-PROCEDURAL ANTICOAGULATION  MANAGEMENT     [facility] requesting pre-procedure hold orders for warfarin and review for bridging      Procedure date: 12/20/24       Procedure: Colonoscopy      Procedure location and phone number (if external): [facility]  ([phone])     Number of warfarin hold days requested and/or target INR: 4 days     Pre-op date: Not applicable      Routing to Anticoagulation Pharmacist for review.      ACC pool: p ANTICOAG HOME MONITORING    Chanelle Molina RN    [facility] Anticoagulation Clinic

## 2024-12-10 NOTE — TELEPHONE ENCOUNTER
Left VM to call [phone] with transfer to [facility] home monitoring    Key Blanco RN  Anticoagulation Clinic

## 2024-12-11 NOTE — TELEPHONE ENCOUNTER
General Call    Contacts       Contact Date/Time Type Contact Phone/Fax    12/09/2024 01:33 PM CST Phone (Outgoing) RAVINDER (Provider) [phone]     AISSATOU Roca    12/10/2024 11:51 AM CST Phone (Outgoing) Jerry De Leon (Self) [phone] (H)    Left Message -  [phone] or anticoag home monitoring    12/11/2024 09:24 AM CST Phone (Incoming) Jerry De Leon (Self) [phone] (H)     anticoag          Reason for Call:  Anticoag    What are your questions or concerns:   The patient is returning a call to [facility] regarding pre- procedure plan.    Date of last appointment with provider: n/a      Could we send this information to you in [facility] or would you prefer to receive a phone call?:   Patient would prefer a phone call   Okay to leave a detailed message?: Yes at Home number on file [phone] (home)

## 2024-12-12 NOTE — TELEPHONE ENCOUNTER
Left a detailed VM with hold plan below, patient has been attempted to reach more than once on this information. Advised in detailed message a mychart with dosing instruction would be sent, advised patient open this and review so Anticoagulation clinic is confirmed that he reviewed and received instruction.    Will close encounter.    Kia Domingo RN

## 2024-12-20 ENCOUNTER — TRANSFERRED RECORDS (OUTPATIENT)
Dept: HEALTH INFORMATION MANAGEMENT | Facility: CLINIC | Age: 54
End: 2024-12-20
Payer: COMMERCIAL

## 2024-12-31 ENCOUNTER — DOCUMENTATION ONLY (OUTPATIENT)
Dept: ANTICOAGULATION | Facility: CLINIC | Age: 54
End: 2024-12-31
Payer: COMMERCIAL

## 2024-12-31 DIAGNOSIS — Z79.01 LONG TERM CURRENT USE OF ANTICOAGULANT THERAPY: Primary | ICD-10-CM

## 2024-12-31 DIAGNOSIS — K51.919 ULCERATIVE COLITIS WITH COMPLICATION, UNSPECIFIED LOCATION (H): ICD-10-CM

## 2024-12-31 LAB — INR HOME MONITORING: 2 (ref 2–3)

## 2024-12-31 NOTE — PROGRESS NOTES
ANTICOAGULATION  MANAGEMENT-Home Monitor Managed by Exception    Donato EUGENE De Leon 54 year old male is on warfarin with therapeutic INR result. (Goal INR 2.0-3.0)    Recent labs: (last 7 days)     12/31/24  0000   INR 2.0       Previous INR was Therapeutic  Medication, diet, health changes since last INR: Patient held for colonoscopy on 12/20, back in range with today's result.  Contacted within the last 12 weeks by phone on 11/8/24  Due for next call no later than: 2/9/25.   Last ACC referral date: 04/18/2024      JULEE Sewell was NOT contacted regarding therapeutic result today per home monitoring policy manage by exception agreement.   Current warfarin dose is to be continued:     Summary  As of 12/31/2024      Full warfarin instructions:  5 mg every Mon; 10 mg all other days   Next INR check:  1/14/2025             ?   Kia Domingo RN  Anticoagulation Clinic  12/31/2024    _______________________________________________________________________     Anticoagulation Episode Summary       Current INR goal:  2.0-3.0   TTR:  77.7% (1 y)   Target end date:  Indefinite   Send INR reminders to:  ANTICOAG HOME MONITORING    Indications    Deep vein thrombosis (DVT) (H) [I82.409] (Resolved) [I82.409]  Long-term (current) use of anticoagulants [Z79.01] [Z79.01]  Deep vein thrombosis (DVT) of left lower extremity  unspecified chronicity  unspecified vein (H) (Resolved) [I82.402]  Ulcerative colitis with complication  unspecified location (H) [K51.919]  Acute deep vein thrombosis (DVT) of right lower extremity  unspecified vein (H) (Resolved) [I82.401]             Comments:  [facility] INR Monitoring patient. Monitor by exception. OK to leave DVM.             Anticoagulation Care Providers       Provider Role Specialty Phone number    Carter Skinner PA-C Referring Family Medicine [phone]

## 2025-01-20 ENCOUNTER — TELEPHONE (OUTPATIENT)
Dept: FAMILY MEDICINE | Facility: CLINIC | Age: 55
End: 2025-01-20

## 2025-01-20 NOTE — TELEPHONE ENCOUNTER
General Call    Contacts       Contact Date/Time Type Contact Phone/Fax    01/20/2025 11:51 AM CST Phone (Incoming) Jerry De Leon (Self) [phone] (H)          Reason for Call:  Billing issue from ANNUAL PREVENTATIVE visit on 10/23/24    What are your questions or concerns:  Pt received a bill for physical when these visits are typically covered - spoke to billing and the rep stated that pt needs to speak to the clinic directly to have bill adjusted. Pt stated that he felt he didn't have anything additional done from a normal physical. Answered the questions that were asked of the pt.     Date of last appointment with provider: 10/23/24    Could we send this information to you in [employer] or would you prefer to receive a phone call?:   Patient would prefer a phone call   Okay to leave a detailed message?: Yes at Cell number on file:    Telephone Information:   Mobile [phone]

## 2025-01-21 ENCOUNTER — TELEPHONE (OUTPATIENT)
Dept: ANTICOAGULATION | Facility: CLINIC | Age: 55
End: 2025-01-21
Payer: COMMERCIAL

## 2025-01-21 ENCOUNTER — DOCUMENTATION ONLY (OUTPATIENT)
Dept: ANTICOAGULATION | Facility: CLINIC | Age: 55
End: 2025-01-21
Payer: COMMERCIAL

## 2025-01-21 DIAGNOSIS — K51.919 ULCERATIVE COLITIS WITH COMPLICATION, UNSPECIFIED LOCATION (H): ICD-10-CM

## 2025-01-21 DIAGNOSIS — Z79.01 LONG TERM CURRENT USE OF ANTICOAGULANT THERAPY: Primary | ICD-10-CM

## 2025-01-21 LAB — INR HOME MONITORING: 2 (ref 2–3)

## 2025-01-21 NOTE — TELEPHONE ENCOUNTER
His cholesterol and history of dvt was address with his medication refills.     Thanks  Carter Skinner PA-C

## 2025-01-21 NOTE — PROGRESS NOTES
ANTICOAGULATION  MANAGEMENT-Home Monitor Managed by Exception    Donato EUGENE De Leon 54 year old male is on warfarin with therapeutic INR result. (Goal INR 2.0-3.0)    Recent labs: (last 7 days)     01/21/25  0000   INR 2.0       Previous INR was Therapeutic  Medication, diet, health changes since last INR: Patient held for colonoscopy on 12/20, back in range with today's result.  Contacted within the last 12 weeks by phone on 11/8/24  Due for next call no later than: 2/9/25.   Last [facility] referral date: 04/18/2024      JULEE Sewell was NOT contacted regarding therapeutic result today per home monitoring policy manage by exception agreement.   Current warfarin dose is to be continued:     Summary  As of 1/21/2025      Full warfarin instructions:  5 mg every Mon; 10 mg all other days   Next INR check:  2/4/2025             ?   April Campa RN  Anticoagulation Clinic  1/21/2025    _______________________________________________________________________     Anticoagulation Episode Summary       Current INR goal:  2.0-3.0   TTR:  83.5% (1 y)   Target end date:  Indefinite   Send INR reminders to:  ANTICOAG HOME MONITORING    Indications    Deep vein thrombosis (DVT) (H) [I82.409] (Resolved) [I82.409]  Long-term (current) use of anticoagulants [Z79.01] [Z79.01]  Deep vein thrombosis (DVT) of left lower extremity  unspecified chronicity  unspecified vein (H) (Resolved) [I82.402]  Ulcerative colitis with complication  unspecified location (H) [K51.919]  Acute deep vein thrombosis (DVT) of right lower extremity  unspecified vein (H) (Resolved) [I82.401]             Comments:  [employer] [facility] INR Monitoring patient. Monitor by exception. OK to leave DVM.             Anticoagulation Care Providers       Provider Role Specialty Phone number    Carter Skinner PA-C Referring Family Medicine [phone]

## 2025-01-21 NOTE — TELEPHONE ENCOUNTER
ANTICOAGULATION     Donato EUGENE De Leon is overdue for an INR check.     Left message reminding patient to check INR with their home meter and call results to the home monitoring company as soon as possible.     Kia Domingo RN  1/21/2025  Anticoagulation Clinic  [facility] for routing messages: [employer] [facility]  [facility] patient phone line: [phone]

## 2025-02-04 ENCOUNTER — OFFICE VISIT (OUTPATIENT)
Dept: URGENT CARE | Facility: URGENT CARE | Age: 55
End: 2025-02-04
Payer: COMMERCIAL

## 2025-02-04 VITALS
DIASTOLIC BLOOD PRESSURE: 89 MMHG | WEIGHT: 266 LBS | HEART RATE: 74 BPM | SYSTOLIC BLOOD PRESSURE: 134 MMHG | OXYGEN SATURATION: 99 % | RESPIRATION RATE: 18 BRPM | BODY MASS INDEX: 35.09 KG/M2 | TEMPERATURE: 97.6 F

## 2025-02-04 DIAGNOSIS — J06.9 VIRAL URI WITH COUGH: Primary | ICD-10-CM

## 2025-02-04 PROCEDURE — 99213 OFFICE O/P EST LOW 20 MIN: CPT | Performed by: PHYSICIAN ASSISTANT

## 2025-02-04 RX ORDER — BENZONATATE 200 MG/1
200 CAPSULE ORAL 3 TIMES DAILY PRN
Qty: 30 CAPSULE | Refills: 0 | Status: SHIPPED | OUTPATIENT
Start: 2025-02-04

## 2025-02-04 NOTE — PATIENT INSTRUCTIONS
Tessalon Perles (benzonatate) 1-2 capsules 3 times daily as needed for cough.    Rest! Your body needs more rest to heal.  Drink plenty of fluids (warm fluids like tea or soup are soothing and reduce cough)  Sit in the bathroom with a hot shower running and breathe in the steam.  Honey may soothe your sore throat and help manage your cough- may take straight or in warm water with lemon juice.  Avoid smoke (cigarettes, bonfires, fireplace, wood burning stoves).  Take Tylenol or an NSAID such as ibuprofen or naproxen as needed for pain.  Afrin (oxymetazoline) nasal spray twice daily for 3 days. Stop after 3 days. Use before flight.  Delsym (dextromethorphan polistirex) is an over the counter cough medication that lasts 12 hours.   Mucinex or Robitussin (guiafenesin) thin mucus and may help it to loosen more quickly  Good handwashing is the best way to prevent spread of germs  Present to emergency room if you develop trouble breathing, swallowing or cough-up blood.  Follow up with your primary care provider if symptoms worsen or fail to improve as expected.

## 2025-02-04 NOTE — PROGRESS NOTES
"Assessment & Plan     Viral URI with cough  - benzonatate (TESSALON) 200 MG capsule; Take 1 capsule (200 mg) by mouth 3 times daily as needed for cough.    Symptoms viral. Symptomatic measures discussed including fluids, rest, Tylenol, ibuprofen. Honey for cough. Afrin before flight.     Return in about 10 days (around 2/14/2025) for visit with primary care provider if not improving.     JOHN [facility]    Subjective   Donato De Leon is a 54 year old who presents for the following health issues     Patient presents with:  Urgent Care  URI: Per patient symptoms started last Thursday symptoms cough, decreased sleeping due to cough, sore throat in the mornings, chills, sweats-feeling \"clammy\", ear discomfort, nasal congestion/post nasal drip and headaches. Patient has taken musinex and cough drops to help       HPI    Jerry presents to [facility] today for evaluation of URI symptoms.   Symptoms began 5 days ago  He has had nasal congestion, post nasal drip, cough from tickle in throat  Today he has been feeling feverish (clammy)  Chest feels clear, not sob  Waking at night from cough    Review of Systems   ROS negative except as stated above.      Objective    /89   Pulse 74   Temp 97.6  F (36.4  C) (Tympanic)   Resp 18   Wt 120.7 kg (266 lb)   SpO2 99%   BMI 35.09 kg/m    Physical Exam   GENERAL: alert and no distress  EYES: Eyes grossly normal to inspection, PERRL and conjunctivae and sclerae normal  HENT: ear canals and TM's normal, nose and mouth without ulcers or lesions  NECK: no adenopathy, no asymmetry, masses, or scars  RESP: lungs clear to auscultation - no rales, rhonchi or wheezes, no increased respiratory effort  CV: regular rate and rhythm, normal S1 S2, no S3 or S4, no murmur, click or rub, no peripheral edema    No results found for any visits on 02/04/25.        "

## 2025-02-06 ENCOUNTER — ANTICOAGULATION THERAPY VISIT (OUTPATIENT)
Dept: ANTICOAGULATION | Facility: CLINIC | Age: 55
End: 2025-02-06
Payer: COMMERCIAL

## 2025-02-06 DIAGNOSIS — Z79.01 LONG TERM CURRENT USE OF ANTICOAGULANT THERAPY: Primary | ICD-10-CM

## 2025-02-06 DIAGNOSIS — K51.919 ULCERATIVE COLITIS WITH COMPLICATION, UNSPECIFIED LOCATION (H): ICD-10-CM

## 2025-02-06 LAB — INR HOME MONITORING: 2.1 (ref 2–3)

## 2025-02-06 NOTE — PROGRESS NOTES
ANTICOAGULATION MANAGEMENT     Donato De Leon 54 year old male is on warfarin with therapeutic INR result. (Goal INR 2.0-3.0)    Recent labs: (last 7 days)     02/06/25  0000   INR 2.1       ASSESSMENT     Source(s): Chart Review     Warfarin doses taken: Reviewed in chart  Diet: No new diet changes identified  Medication/supplement changes:  Tessalon perles for cough  New illness, injury, or hospitalization: Yes: viral symptoms, assessed at urgent care on 2/4/25  Signs or symptoms of bleeding or clotting: No  Previous result: Therapeutic last 2(+) visits  Additional findings:  12 week outreach call for [facility] done today       PLAN     Recommended plan for temporary change(s) affecting INR     Dosing Instructions: Continue your current warfarin dose with next INR in 2 weeks       Summary  As of 2/6/2025      Full warfarin instructions:  5 mg every Mon; 10 mg all other days   Next INR check:  2/20/2025               Detailed voice message left for Jerry with dosing instructions and follow up date.   Sent [employer] message with dosing and follow up instructions    Patient to recheck with home meter    Education provided: Please call back if any changes to your diet, medications or how you've been taking warfarin  Interaction IS NOT anticipated between warfarin and tessalon perles  Importance of notifying anticoagulation clinic for: changes in medications; a sooner lab recheck maybe needed, diarrhea, nausea/vomiting, reduced intake, cold/flu, and/or infections; a sooner lab recheck maybe needed, and upcoming surgeries and procedures 2 weeks in advance  Resume manage by exception with home monitor. Continue to submit INR results to home monitor company.You will only be called when your result is out of range and at 90 day check in. Please call and notify [facility] if new medication started, dose missed, signs or symptoms of bleeding or clotting, or a surgery/procedure is scheduled. Due for next call no later than: 5/7/25.  Contact  [phone] with any changes, questions or concerns.     Plan made per [facility] anticoagulation protocol    Alla Colmenares, RN  2/6/2025  Anticoagulation Clinic  [facility] for routing messages: p ANTICOAG HOME MONITORING  [facility] patient phone line: [phone]        _______________________________________________________________________     Anticoagulation Episode Summary       Current INR goal:  2.0-3.0   TTR:  87.9% (1 y)   Target end date:  Indefinite   Send INR reminders to:  ANTICOAG HOME MONITORING    Indications    Deep vein thrombosis (DVT) (H) [I82.409] (Resolved) [I82.409]  Long-term (current) use of anticoagulants [Z79.01] [Z79.01]  Deep vein thrombosis (DVT) of left lower extremity  unspecified chronicity  unspecified vein (H) (Resolved) [I82.402]  Ulcerative colitis with complication  unspecified location (H) [K51.919]  Acute deep vein thrombosis (DVT) of right lower extremity  unspecified vein (H) (Resolved) [I82.401]             Comments:  [employer] [facility] INR Monitoring patient. Monitor by exception. OK to leave DVM.             Anticoagulation Care Providers       Provider Role Specialty Phone number    Carter Skinner PA-C Referring Family Medicine [phone]

## 2025-02-27 ENCOUNTER — MYC MEDICAL ADVICE (OUTPATIENT)
Dept: ANTICOAGULATION | Facility: CLINIC | Age: 55
End: 2025-02-27
Payer: COMMERCIAL

## 2025-02-27 NOTE — TELEPHONE ENCOUNTER
ANTICOAGULATION     Donato De Leon is overdue for an INR check.     [employer] message sent     Annette Preciado, RN  2/27/2025  Anticoagulation Clinic  [facility] for routing messages: [employer] [employer] [facility]  [facility] patient phone line: [phone]

## 2025-03-01 LAB — INR HOME MONITORING: 2.6 (ref 2–3)

## 2025-03-03 ENCOUNTER — DOCUMENTATION ONLY (OUTPATIENT)
Dept: ANTICOAGULATION | Facility: CLINIC | Age: 55
End: 2025-03-03
Payer: COMMERCIAL

## 2025-03-03 DIAGNOSIS — Z79.01 LONG TERM CURRENT USE OF ANTICOAGULANT THERAPY: Primary | ICD-10-CM

## 2025-03-03 DIAGNOSIS — K51.919 ULCERATIVE COLITIS WITH COMPLICATION, UNSPECIFIED LOCATION (H): ICD-10-CM

## 2025-03-03 NOTE — PROGRESS NOTES
ANTICOAGULATION  MANAGEMENT-Home Monitor Managed by Exception    Donato EUGENE De Leon 54 year old male is on warfarin with therapeutic INR result. (Goal INR 2.0-3.0)    Recent labs: (last 7 days)     03/01/25  0000   INR 2.6       Previous INR was Therapeutic  Medication, diet, health changes since last INR:chart reviewed; none identified  Contacted within the last 12 weeks by phone on 2/6/25  Last [facility] referral date: 04/18/2024      JULEE Sewell was NOT contacted regarding therapeutic result today per home monitoring policy manage by exception agreement.   Current warfarin dose is to be continued:     Summary  As of 3/3/2025      Full warfarin instructions:  5 mg every Mon; 10 mg all other days   Next INR check:  3/14/2025             ?   Maggie Varghese RN  Anticoagulation Clinic  3/3/2025    _______________________________________________________________________     Anticoagulation Episode Summary       Current INR goal:  2.0-3.0   TTR:  88.4% (1 y)   Target end date:  Indefinite   Send INR reminders to:  ANTICOAG HOME MONITORING    Indications    Deep vein thrombosis (DVT) (H) [I82.409] (Resolved) [I82.409]  Long-term (current) use of anticoagulants [Z79.01] [Z79.01]  Deep vein thrombosis (DVT) of left lower extremity  unspecified chronicity  unspecified vein (H) (Resolved) [I82.402]  Ulcerative colitis with complication  unspecified location (H) [K51.919]  Acute deep vein thrombosis (DVT) of right lower extremity  unspecified vein (H) (Resolved) [I82.401]             Comments:  [employer] [facility] INR Monitoring patient. Monitor by exception. OK to leave DVM.             Anticoagulation Care Providers       Provider Role Specialty Phone number    Carter Skinner PA-C Referring Family Medicine [phone]

## 2025-03-24 ENCOUNTER — MYC MEDICAL ADVICE (OUTPATIENT)
Dept: ANTICOAGULATION | Facility: CLINIC | Age: 55
End: 2025-03-24
Payer: COMMERCIAL

## 2025-03-31 ENCOUNTER — DOCUMENTATION ONLY (OUTPATIENT)
Dept: ANTICOAGULATION | Facility: CLINIC | Age: 55
End: 2025-03-31
Payer: COMMERCIAL

## 2025-03-31 NOTE — LETTER
[facility] ANTICO[facility]UL[facility]INIC  [address]  [facility] [address]  Phone: [phone]  Fax: [phone]   March 31, 2025        Donato De Leon  [address] [place] [address]            Dear Donato,    You are currently under the care of [facility] for your warfarin (Coumadin , Jantoven ) therapy.  We are contacting you because our records show you were due for an INR on 2/20/25.    There are potentially serious risks when taking warfarin without careful monitoring and we want to make sure you are safely managed.  Routine lab monitoring is required for warfarin refills.     Please check INR with your home meter and report results to the home monitoring company as soon as possible. Please call us at [phone] if you are unable to test with your home meter currently. If there has been a change in your care or other concerns, please let us know so we can help and/or update our records.         Sincerely,       [facility]

## 2025-03-31 NOTE — PROGRESS NOTES
ANTICOAGULATION     Donato Brownkirk is overdue for an INR check.     Reminder letter sent    Annette Preciado, RN  3/31/2025  Anticoagulation Clinic  [facility] for routing messages: [employer] [facility]  [facility] patient phone line: [phone]

## 2025-04-09 ENCOUNTER — DOCUMENTATION ONLY (OUTPATIENT)
Dept: ANTICOAGULATION | Facility: CLINIC | Age: 55
End: 2025-04-09
Payer: COMMERCIAL

## 2025-04-09 DIAGNOSIS — Z79.01 LONG TERM CURRENT USE OF ANTICOAGULANT THERAPY: Primary | ICD-10-CM

## 2025-04-09 DIAGNOSIS — K51.919 ULCERATIVE COLITIS WITH COMPLICATION, UNSPECIFIED LOCATION (H): ICD-10-CM

## 2025-04-09 LAB — INR HOME MONITORING: 2 (ref 2–3)

## 2025-04-09 NOTE — PROGRESS NOTES
ANTICOAGULATION  MANAGEMENT-Home Monitor Managed by Exception    Donato EUGENE De Leon 55 year old male is on warfarin with therapeutic INR result. (Goal INR 2.0-3.0)    Recent labs: (last 7 days)     04/09/25  0000   INR 2.0       Previous INR was Therapeutic  Medication, diet, health changes since last INR:chart reviewed; none identified  Contacted within the last 12 weeks by phone on 2/6/25  Due for next call no later than: 5/7/25.   Last [facility] referral date: 04/18/2024      JULEE Sewell was NOT contacted regarding therapeutic result today per home monitoring policy manage by exception agreement.   Current warfarin dose is to be continued:     Summary  As of 4/9/2025      Full warfarin instructions:  5 mg every Mon; 10 mg all other days   Next INR check:  4/23/2025             ?   Kia Domingo RN  Anticoagulation Clinic  4/9/2025    _______________________________________________________________________     Anticoagulation Episode Summary       Current INR goal:  2.0-3.0   TTR:  88.4% (1 y)   Target end date:  Indefinite   Send INR reminders to:  [employer] [facility]IT[facility]    Indications    Deep vein thrombosis (DVT) (H) [I82.409] (Resolved) [I82.409]  Long-term (current) use of anticoagulants [Z79.01] [Z79.01]  Deep vein thrombosis (DVT) of left lower extremity  unspecified chronicity  unspecified vein (H) (Resolved) [I82.402]  Ulcerative colitis with complication  unspecified location (H) [K51.919]  Acute deep vein thrombosis (DVT) of right lower extremity  unspecified vein (H) (Resolved) [I82.401]             Comments:  [employer] [facility] INR Monitoring patient. Monitor by exception. OK to leave DVM.             Anticoagulation Care Providers       Provider Role Specialty Phone number    Carter Skinner PA-C Referring Family Medicine [phone]

## 2025-04-23 ENCOUNTER — DOCUMENTATION ONLY (OUTPATIENT)
Dept: ANTICOAGULATION | Facility: CLINIC | Age: 55
End: 2025-04-23
Payer: COMMERCIAL

## 2025-04-23 DIAGNOSIS — I82.402 DEEP VEIN THROMBOSIS (DVT) OF LEFT LOWER EXTREMITY, UNSPECIFIED CHRONICITY, UNSPECIFIED VEIN (H): Primary | ICD-10-CM

## 2025-04-23 NOTE — PROGRESS NOTES
ANTICOAGULATION CLINIC REFERRAL RENEWAL REQUEST       An annual renewal order is required for all patients referred to [facility].?  Please review and sign the pended referral order for Donato De Leon.       ANTICOAGULATION SUMMARY      Warfarin indication(s)   DVT    Mechanical heart valve present?  NO       Current goal range   INR: 2.0-3.0     Goal appropriate for indication? Goal INR 2-3, standard for indication(s) above     Time in Therapeutic Range (TTR)  (Goal > 60%) 88%       Office visit with referring provider's group within last year yes on 10/23/24       Annette Preciado RN  [facility]

## 2025-04-29 ENCOUNTER — MYC MEDICAL ADVICE (OUTPATIENT)
Dept: ANTICOAGULATION | Facility: CLINIC | Age: 55
End: 2025-04-29
Payer: COMMERCIAL

## 2025-04-29 NOTE — TELEPHONE ENCOUNTER
ANTICOAGULATION     Donato De Leon is overdue for an INR check.     [employer] message sent     Annette Preciado, RN  4/29/2025  Anticoagulation Clinic  [facility] for routing messages: [employer] [facility]  [facility] patient phone line: [phone]

## 2025-05-04 LAB — INR HOME MONITORING: 2 (ref 2–3)

## 2025-05-05 ENCOUNTER — ANTICOAGULATION THERAPY VISIT (OUTPATIENT)
Dept: ANTICOAGULATION | Facility: CLINIC | Age: 55
End: 2025-05-05
Payer: COMMERCIAL

## 2025-05-05 DIAGNOSIS — K51.919 ULCERATIVE COLITIS WITH COMPLICATION, UNSPECIFIED LOCATION (H): ICD-10-CM

## 2025-05-05 DIAGNOSIS — I82.402 DEEP VEIN THROMBOSIS (DVT) OF LEFT LOWER EXTREMITY, UNSPECIFIED CHRONICITY, UNSPECIFIED VEIN (H): ICD-10-CM

## 2025-05-05 DIAGNOSIS — Z79.01 LONG TERM CURRENT USE OF ANTICOAGULANT THERAPY: Primary | ICD-10-CM

## 2025-05-05 NOTE — PROGRESS NOTES
ANTICOAGULATION MANAGEMENT     Donato De Leon 55 year old male is on warfarin with therapeutic INR result. (Goal INR 2.0-3.0)    Recent labs: (last 7 days)     05/04/25  0000   INR 2.0       ASSESSMENT     Source(s): Chart Review  Previous INR was Therapeutic last 2(+) visits  Medication, diet, health changes since last INR chart reviewed; none identified         PLAN     Recommended plan for no diet, medication or health factor changes affecting INR     Dosing Instructions: Continue your current warfarin dose with next INR in 2 weeks       Summary  As of 5/5/2025      Full warfarin instructions:  5 mg every Mon; 10 mg all other days   Next INR check:  6/2/2025               Detailed voice message left for Jerry with dosing instructions and follow up date.     Patient to recheck with home meter    Education provided: Please call back if any changes to your diet, medications or how you've been taking warfarin  Resume manage by exception with home monitor. Continue to submit INR results to home monitor company.You will only be called when your result is out of range and at 90 day check in. Please call and notify [facility] if new medication started, dose missed, signs or symptoms of bleeding or clotting, or a surgery/procedure is scheduled. Due for next call no later than: 8/3/25.  Contact [phone] with any changes, questions or concerns.     Plan made per [facility] anticoagulation protocol    Cuong Glover RN  5/5/2025  Anticoagulation Clinic  [facility] for routing messages: p ANTICOAG HOME MONITORING  [facility] patient phone line: [phone]        _______________________________________________________________________     Anticoagulation Episode Summary       Current INR goal:  2.0-3.0   TTR:  88.4% (1 y)   Target end date:  Indefinite   Send INR reminders to:  ANTICOAG HOME MONITORING    Indications    Deep vein thrombosis (DVT) (H) [I82.409] (Resolved) [I82.409]  Long-term (current) use of anticoagulants [Z79.01]  [Z79.01]  Deep vein thrombosis (DVT) of left lower extremity  unspecified chronicity  unspecified vein (H) (Resolved) [I82.402]  Ulcerative colitis with complication  unspecified location (H) [K51.919]  Acute deep vein thrombosis (DVT) of right lower extremity  unspecified vein (H) (Resolved) [I82.401]  Deep vein thrombosis (DVT) of left lower extremity  unspecified chronicity  unspecified vein (H) [I82.402]             Comments:  [employer] [facility] INR Monitoring patient. Monitor by exception. OK to leave DVM.             Anticoagulation Care Providers       Provider Role Specialty Phone number    Carter Skinner PA-C Referring Family Medicine [phone]

## 2025-05-22 DIAGNOSIS — I82.402 DEEP VEIN THROMBOSIS (DVT) OF LEFT LOWER EXTREMITY, UNSPECIFIED CHRONICITY, UNSPECIFIED VEIN (H): ICD-10-CM

## 2025-05-22 RX ORDER — WARFARIN SODIUM 5 MG/1
5-10 TABLET ORAL EVERY EVENING
Qty: 180 TABLET | Refills: 1 | Status: SHIPPED | OUTPATIENT
Start: 2025-05-22

## 2025-05-22 NOTE — TELEPHONE ENCOUNTER
ANTICOAGULATION MANAGEMENT:  Medication Refill    Anticoagulation Summary  As of 5/5/2025      Warfarin maintenance plan:  5 mg (5 mg x 1) every Mon; 10 mg (5 mg x 2) all other days   Next INR check:  6/2/2025   Target end date:  Indefinite    Indications    Deep vein thrombosis (DVT) (H) [I82.409] (Resolved) [I82.409]  Long-term (current) use of anticoagulants [Z79.01] [Z79.01]  Deep vein thrombosis (DVT) of left lower extremity  unspecified chronicity  unspecified vein (H) (Resolved) [I82.402]  Ulcerative colitis with complication  unspecified location (H) [K51.919]  Acute deep vein thrombosis (DVT) of right lower extremity  unspecified vein (H) (Resolved) [I82.401]  Deep vein thrombosis (DVT) of left lower extremity  unspecified chronicity  unspecified vein (H) [I82.402]                 Anticoagulation Care Providers       Provider Role Specialty Phone number    Carter Siknner PA-C Referring Family Medicine [phone]            Refill Criteria    Visit with referring provider/group: Meets criteria: visit within referring provider group in the last 15 months on 10/23/24    [facility] referral last signed: 04/23/2025; within last year:  Yes    Lab monitoring is up to date (not exceeding 2 weeks overdue): Yes    Donato meets all criteria for refill. Rx instructions and quantity supplied updated to match patient's current dosing plan. Warfarin 90 day supply with 1 refill granted per [facility] protocol     Lizbet Mack RN  Anticoagulation Clinic     Stable

## 2025-05-24 LAB — INR HOME MONITORING: 2.2 (ref 2–3)

## 2025-05-27 ENCOUNTER — DOCUMENTATION ONLY (OUTPATIENT)
Dept: ANTICOAGULATION | Facility: CLINIC | Age: 55
End: 2025-05-27
Payer: COMMERCIAL

## 2025-05-27 DIAGNOSIS — Z79.01 LONG TERM CURRENT USE OF ANTICOAGULANT THERAPY: Primary | ICD-10-CM

## 2025-05-27 DIAGNOSIS — I82.402 DEEP VEIN THROMBOSIS (DVT) OF LEFT LOWER EXTREMITY, UNSPECIFIED CHRONICITY, UNSPECIFIED VEIN (H): ICD-10-CM

## 2025-05-27 DIAGNOSIS — K51.919 ULCERATIVE COLITIS WITH COMPLICATION, UNSPECIFIED LOCATION (H): ICD-10-CM

## 2025-05-27 NOTE — PROGRESS NOTES
ANTICOAGULATION  MANAGEMENT-Home Monitor Managed by Exception    Donato EUGENE De Leon 55 year old male is on warfarin with therapeutic INR result. (Goal INR 2.0-3.0)    Recent labs: (last 7 days)     05/24/25  0000   INR 2.2       Previous INR was Therapeutic  Medication, diet, health changes since last INR:chart reviewed; none identified  Contacted within the last 12 weeks by phone on 5/5/25  Last [facility] referral date: 04/23/2025      JULEE Sewell was NOT contacted regarding therapeutic result today per home monitoring policy manage by exception agreement.   Current warfarin dose is to be continued:     Summary  As of 5/27/2025      Full warfarin instructions:  5 mg every Mon; 10 mg all other days   Next INR check:  6/6/2025             ?   Eliza Donovan, RN  Anticoagulation Clinic  5/27/2025    _______________________________________________________________________     Anticoagulation Episode Summary       Current INR goal:  2.0-3.0   TTR:  88.3% (1 y)   Target end date:  Indefinite   Send INR reminders to:  ANTICOAG HOME MONITORING    Indications    Deep vein thrombosis (DVT) (H) [I82.409] (Resolved) [I82.409]  Long-term (current) use of anticoagulants [Z79.01] [Z79.01]  Deep vein thrombosis (DVT) of left lower extremity  unspecified chronicity  unspecified vein (H) (Resolved) [I82.402]  Ulcerative colitis with complication  unspecified location (H) [K51.919]  Acute deep vein thrombosis (DVT) of right lower extremity  unspecified vein (H) (Resolved) [I82.401]  Deep vein thrombosis (DVT) of left lower extremity  unspecified chronicity  unspecified vein (H) [I82.402]             Comments:  [employer] INR Monitoring patient. Monitor by exception. OK to leave DVM.             Anticoagulation Care Providers       Provider Role Specialty Phone number    Carter Skinner PA-C Referring Family Medicine [phone]

## 2025-06-10 LAB — INR HOME MONITORING: 2.1 (ref 2–3)

## 2025-06-11 ENCOUNTER — DOCUMENTATION ONLY (OUTPATIENT)
Dept: ANTICOAGULATION | Facility: CLINIC | Age: 55
End: 2025-06-11
Payer: COMMERCIAL

## 2025-06-11 ENCOUNTER — RESULTS FOLLOW-UP (OUTPATIENT)
Dept: ANTICOAGULATION | Facility: CLINIC | Age: 55
End: 2025-06-11
Payer: COMMERCIAL

## 2025-06-11 DIAGNOSIS — Z79.01 LONG TERM CURRENT USE OF ANTICOAGULANT THERAPY: Primary | ICD-10-CM

## 2025-06-11 DIAGNOSIS — I82.402 DEEP VEIN THROMBOSIS (DVT) OF LEFT LOWER EXTREMITY, UNSPECIFIED CHRONICITY, UNSPECIFIED VEIN (H): ICD-10-CM

## 2025-06-11 DIAGNOSIS — K51.919 ULCERATIVE COLITIS WITH COMPLICATION, UNSPECIFIED LOCATION (H): ICD-10-CM

## 2025-06-11 NOTE — PROGRESS NOTES
ANTICOAGULATION  MANAGEMENT-Home Monitor Managed by Exception    Donato EUGENE De Leon 55 year old male is on warfarin with therapeutic INR result. (Goal INR 2.0-3.0)    Recent labs: (last 7 days)     06/10/25  0000   INR 2.1       Previous INR was Therapeutic  Medication, diet, health changes since last INR:chart reviewed; none identified  Contacted within the last 12 weeks by phone on 5/5/25  Due for next call no later than: 8/3/25   Last [facility] referral date: 04/23/2025      JULEE Sewell was NOT contacted regarding therapeutic result today per home monitoring policy manage by exception agreement.   Current warfarin dose is to be continued:     Summary  As of 6/11/2025      Full warfarin instructions:  5 mg every Mon; 10 mg all other days   Next INR check:  6/24/2025             ?   Cuong Glover RN  Anticoagulation Clinic  6/11/2025    _______________________________________________________________________     Anticoagulation Episode Summary       Current INR goal:  2.0-3.0   TTR:  90.3% (1 y)   Target end date:  Indefinite   Send INR reminders to:  [employer] [facility]IT[facility]    Indications    Deep vein thrombosis (DVT) (H) [I82.409] (Resolved) [I82.409]  Long-term (current) use of anticoagulants [Z79.01] [Z79.01]  Deep vein thrombosis (DVT) of left lower extremity  unspecified chronicity  unspecified vein (H) (Resolved) [I82.402]  Ulcerative colitis with complication  unspecified location (H) [K51.919]  Acute deep vein thrombosis (DVT) of right lower extremity  unspecified vein (H) (Resolved) [I82.401]  Deep vein thrombosis (DVT) of left lower extremity  unspecified chronicity  unspecified vein (H) [I82.402]             Comments:  [employer] INR Monitoring patient. Monitor by exception. OK to leave DVM.             Anticoagulation Care Providers       Provider Role Specialty Phone number    Carter Skinner PA-C Referring Family Medicine [phone]

## 2025-07-02 ENCOUNTER — RESULTS FOLLOW-UP (OUTPATIENT)
Dept: ANTICOAGULATION | Facility: CLINIC | Age: 55
End: 2025-07-02

## 2025-07-02 ENCOUNTER — DOCUMENTATION ONLY (OUTPATIENT)
Dept: ANTICOAGULATION | Facility: CLINIC | Age: 55
End: 2025-07-02
Payer: COMMERCIAL

## 2025-07-02 DIAGNOSIS — K51.919 ULCERATIVE COLITIS WITH COMPLICATION, UNSPECIFIED LOCATION (H): ICD-10-CM

## 2025-07-02 DIAGNOSIS — I82.402 DEEP VEIN THROMBOSIS (DVT) OF LEFT LOWER EXTREMITY, UNSPECIFIED CHRONICITY, UNSPECIFIED VEIN (H): ICD-10-CM

## 2025-07-02 DIAGNOSIS — Z79.01 LONG TERM CURRENT USE OF ANTICOAGULANT THERAPY: Primary | ICD-10-CM

## 2025-07-02 LAB — INR HOME MONITORING: 2.5 (ref 2–3)

## 2025-07-02 NOTE — PROGRESS NOTES
ANTICOAGULATION  MANAGEMENT-Home Monitor Managed by Exception    Donato EUGENE De Leon 55 year old male is on warfarin with therapeutic INR result. (Goal INR 2.0-3.0)    Recent labs: (last 7 days)     07/02/25  0000   INR 2.5       Previous INR was Therapeutic  Medication, diet, health changes since last INR:chart reviewed; none identified  Contacted within the last 12 weeks by phone on 5/5/25  Due for next call no later than: 8/3/25   Last [facility] referral date: 04/23/2025      JULEE Sewell was NOT contacted regarding therapeutic result today per home monitoring policy manage by exception agreement.   Current warfarin dose is to be continued:     Summary  As of 7/2/2025      Full warfarin instructions:  5 mg every Mon; 10 mg all other days   Next INR check:  7/16/2025             ?   April Campa RN  Anticoagulation Clinic  7/2/2025    _______________________________________________________________________     Anticoagulation Episode Summary       Current INR goal:  2.0-3.0   TTR:  93.3% (1 y)   Target end date:  Indefinite   Send INR reminders to:  [facility]    Indications    Deep vein thrombosis (DVT) (H) [I82.409] (Resolved) [I82.409]  Long-term (current) use of anticoagulants [Z79.01] [Z79.01]  Deep vein thrombosis (DVT) of left lower extremity  unspecified chronicity  unspecified vein (H) (Resolved) [I82.402]  Ulcerative colitis with complication  unspecified location (H) [K51.919]  Acute deep vein thrombosis (DVT) of right lower extremity  unspecified vein (H) (Resolved) [I82.401]  Deep vein thrombosis (DVT) of left lower extremity  unspecified chronicity  unspecified vein (H) [I82.402]             Comments:  [facility]R Monitoring patient. Monitor by exception. OK to leave DVM.             Anticoagulation Care Providers       Provider Role Specialty Phone number    Carter Skinner PA-C Referring Family Medicine [phone]

## 2025-07-23 ENCOUNTER — DOCUMENTATION ONLY (OUTPATIENT)
Dept: ANTICOAGULATION | Facility: CLINIC | Age: 55
End: 2025-07-23
Payer: COMMERCIAL

## 2025-07-23 NOTE — PROGRESS NOTES
ANTICOAGULATION     Donato De Leon is overdue for an INR check.     Care Everywhere updated: yes    Mychart sent.    Kia Domingo RN  7/23/2025  Anticoagulation Clinic  [facility] for routing messages: [facility]  [facility] patient phone line: [phone]

## 2025-07-28 ENCOUNTER — DOCUMENTATION ONLY (OUTPATIENT)
Dept: ANTICOAGULATION | Facility: CLINIC | Age: 55
End: 2025-07-28
Payer: COMMERCIAL

## 2025-07-28 DIAGNOSIS — Z79.01 LONG TERM CURRENT USE OF ANTICOAGULANT THERAPY: Primary | ICD-10-CM

## 2025-07-28 DIAGNOSIS — I82.402 DEEP VEIN THROMBOSIS (DVT) OF LEFT LOWER EXTREMITY, UNSPECIFIED CHRONICITY, UNSPECIFIED VEIN (H): ICD-10-CM

## 2025-07-28 DIAGNOSIS — K51.919 ULCERATIVE COLITIS WITH COMPLICATION, UNSPECIFIED LOCATION (H): ICD-10-CM

## 2025-07-28 LAB — INR HOME MONITORING: 2.2 (ref 2–3)

## 2025-07-28 NOTE — PROGRESS NOTES
ANTICOAGULATION  MANAGEMENT-Home Monitor Managed by Exception    Donato EUGENE De Leon 55 year old male is on warfarin with therapeutic INR result. (Goal INR 2.0-3.0)    Recent labs: (last 7 days)     07/28/25  0000   INR 2.2       Care Everywhere updated: yes    Previous INR was Therapeutic  Medication, diet, health changes since last INR:chart reviewed; none identified  Contacted within the last 12 weeks by phone on 5/5/25  Last [facility] referral date: 04/23/2025      JULEE     Donato was NOT contacted regarding therapeutic result today per home monitoring policy manage by exception agreement.   Current warfarin dose is to be continued:     Summary  As of 7/28/2025      Full warfarin instructions:  5 mg every Mon; 10 mg all other days   Next INR check:  8/11/2025             ?   Anya Childers RN  Anticoagulation Clinic  7/28/2025    _______________________________________________________________________     Anticoagulation Episode Summary       Current INR goal:  2.0-3.0   TTR:  97.3% (1 y)   Target end date:  Indefinite   Send INR reminders to:  [facility]    Indications    Deep vein thrombosis (DVT) (H) [I82.409] (Resolved) [I82.409]  Long-term (current) use of anticoagulants [Z79.01] [Z79.01]  Deep vein thrombosis (DVT) of left lower extremity  unspecified chronicity  unspecified vein (H) (Resolved) [I82.402]  Ulcerative colitis with complication  unspecified location (H) [K51.919]  Acute deep vein thrombosis (DVT) of right lower extremity  unspecified vein (H) (Resolved) [I82.401]  Deep vein thrombosis (DVT) of left lower extremity  unspecified chronicity  unspecified vein (H) [I82.402]             Comments:  [facility] INR Monitoring patient. Monitor by exception. OK to leave DVM.             Anticoagulation Care Providers       Provider Role Specialty Phone number    Carter Skinner PA-C Referring Family Medicine [phone]

## 2025-08-13 ENCOUNTER — ANTICOAGULATION THERAPY VISIT (OUTPATIENT)
Dept: ANTICOAGULATION | Facility: CLINIC | Age: 55
End: 2025-08-13
Payer: COMMERCIAL

## 2025-08-13 ENCOUNTER — RESULTS FOLLOW-UP (OUTPATIENT)
Dept: ANTICOAGULATION | Facility: CLINIC | Age: 55
End: 2025-08-13
Payer: COMMERCIAL

## 2025-08-13 DIAGNOSIS — I82.402 DEEP VEIN THROMBOSIS (DVT) OF LEFT LOWER EXTREMITY, UNSPECIFIED CHRONICITY, UNSPECIFIED VEIN (H): ICD-10-CM

## 2025-08-13 DIAGNOSIS — K51.919 ULCERATIVE COLITIS WITH COMPLICATION, UNSPECIFIED LOCATION (H): ICD-10-CM

## 2025-08-13 DIAGNOSIS — Z79.01 LONG TERM CURRENT USE OF ANTICOAGULANT THERAPY: Primary | ICD-10-CM

## 2025-08-13 LAB — INR HOME MONITORING: 2.2 (ref 2–3)

## 2025-08-21 ENCOUNTER — TRANSFERRED RECORDS (OUTPATIENT)
Dept: MULTI SPECIALTY CLINIC | Facility: CLINIC | Age: 55
End: 2025-08-21
Payer: COMMERCIAL

## 2025-08-21 LAB
ALT SERPL-CCNC: 15 IU/L (ref 0–44)
AST SERPL-CCNC: 18 IU/L (ref 0–40)
CREATININE (EXTERNAL): 1.11 MG/DL (ref 0.76–1.27)
GFR ESTIMATED (EXTERNAL): 78 ML/MIN/1.73
GLUCOSE (EXTERNAL): 89 MG/DL (ref 70–99)
POTASSIUM (EXTERNAL): 5 MMOL/L (ref 3.5–5.2)

## 2025-09-03 ENCOUNTER — DOCUMENTATION ONLY (OUTPATIENT)
Dept: ANTICOAGULATION | Facility: CLINIC | Age: 55
End: 2025-09-03
Payer: COMMERCIAL